# Patient Record
Sex: FEMALE | Race: WHITE
[De-identification: names, ages, dates, MRNs, and addresses within clinical notes are randomized per-mention and may not be internally consistent; named-entity substitution may affect disease eponyms.]

---

## 2018-06-22 ENCOUNTER — HOSPITAL ENCOUNTER (EMERGENCY)
Dept: HOSPITAL 97 - ER | Age: 33
LOS: 1 days | Discharge: HOME | End: 2018-06-23
Payer: COMMERCIAL

## 2018-06-22 DIAGNOSIS — F17.210: ICD-10-CM

## 2018-06-22 DIAGNOSIS — Z88.3: ICD-10-CM

## 2018-06-22 DIAGNOSIS — Z91.040: ICD-10-CM

## 2018-06-22 DIAGNOSIS — R00.2: Primary | ICD-10-CM

## 2018-06-22 DIAGNOSIS — I10: ICD-10-CM

## 2018-06-22 DIAGNOSIS — Z91.048: ICD-10-CM

## 2018-06-22 LAB
BUN BLD-MCNC: 10 MG/DL (ref 7–18)
GLUCOSE SERPLBLD-MCNC: 148 MG/DL (ref 74–106)
HCT VFR BLD CALC: 42 % (ref 36–45)
LYMPHOCYTES # SPEC AUTO: 2.4 K/UL (ref 0.7–4.9)
MAGNESIUM SERPL-MCNC: 2 MG/DL (ref 1.8–2.4)
MCH RBC QN AUTO: 30.4 PG (ref 27–35)
MCV RBC: 87.7 FL (ref 80–100)
METHAMPHET UR QL SCN: NEGATIVE
PMV BLD: 8.2 FL (ref 7.6–11.3)
POTASSIUM SERPL-SCNC: 3.1 MMOL/L (ref 3.5–5.1)
RBC # BLD: 4.8 M/UL (ref 3.86–4.86)
THC SERPL-MCNC: NEGATIVE NG/ML
TSH SERPL DL<=0.05 MIU/L-ACNC: 2.13 UIU/ML (ref 0.36–3.74)

## 2018-06-22 PROCEDURE — 93005 ELECTROCARDIOGRAM TRACING: CPT

## 2018-06-22 PROCEDURE — 80307 DRUG TEST PRSMV CHEM ANLYZR: CPT

## 2018-06-22 PROCEDURE — 85025 COMPLETE CBC W/AUTO DIFF WBC: CPT

## 2018-06-22 PROCEDURE — 96361 HYDRATE IV INFUSION ADD-ON: CPT

## 2018-06-22 PROCEDURE — 71275 CT ANGIOGRAPHY CHEST: CPT

## 2018-06-22 PROCEDURE — 80048 BASIC METABOLIC PNL TOTAL CA: CPT

## 2018-06-22 PROCEDURE — 99285 EMERGENCY DEPT VISIT HI MDM: CPT

## 2018-06-22 PROCEDURE — 84484 ASSAY OF TROPONIN QUANT: CPT

## 2018-06-22 PROCEDURE — 84443 ASSAY THYROID STIM HORMONE: CPT

## 2018-06-22 PROCEDURE — 36415 COLL VENOUS BLD VENIPUNCTURE: CPT

## 2018-06-22 PROCEDURE — 71045 X-RAY EXAM CHEST 1 VIEW: CPT

## 2018-06-22 PROCEDURE — 85379 FIBRIN DEGRADATION QUANT: CPT

## 2018-06-22 PROCEDURE — 81003 URINALYSIS AUTO W/O SCOPE: CPT

## 2018-06-22 PROCEDURE — 81025 URINE PREGNANCY TEST: CPT

## 2018-06-22 PROCEDURE — 83735 ASSAY OF MAGNESIUM: CPT

## 2018-06-22 PROCEDURE — 96360 HYDRATION IV INFUSION INIT: CPT

## 2018-06-22 NOTE — RAD REPORT
EXAM DESCRIPTION:  RAD - Chest Single View - 6/22/2018 9:21 pm

 

CLINICAL HISTORY:  SOB

Chest pain.

 

COMPARISON:  Chest Single View dated 9/20/2017; CHEST SINGLE VIEW dated 7/16/2015; Chest For Pe Angio
 dated 9/21/2017

 

FINDINGS:  Portable technique limits examination quality.

 

The lungs are grossly clear. The heart is normal in size. No displaced fractures.

 

IMPRESSION:  No acute intrathoracic process suspected.

## 2018-06-23 NOTE — RAD REPORT
EXAM DESCRIPTION:  CT - Chest For Pe Angio - 6/23/2018 7:23 am

 

CLINICAL HISTORY:  Chest pain.

SOB

 

COMPARISON:  Chest For Pe Angio dated 9/21/2017; CTANGIO CHEST FOR PE dated 7/16/2015

 

TECHNIQUE:  CT angiogram of the pulmonary arteries was performed with MIP.

 

All CT scans are performed using dose optimization technique as appropriate and may include automated
 exposure control or mA/KV adjustment according to patient size.

 

FINDINGS:  No evidence of pulmonary thromboembolism.

 

No acute aortic finding demonstrated.

 

The lungs are clear.

 

No significant pericardial or pleural fluid.

 

No concerning bony finding.

 

IMPRESSION:  No evidence of pulmonary thromboembolism.

 

No acute lung findings.

## 2018-06-23 NOTE — ER
Nurse's Notes                                                                                     

 [facility]                                                                

Name: Pia Hawkins                                                                             

Age: 33 yrs                                                                                       

Sex: Female                                                                                       

: 1985                                                                                   

MRN: [id number]                                                                                   

Arrival Date: 2018                                                                          

Time: 20:26                                                                                       

Account#: [id number]                                                                            

Bed 8                                                                                             

Private MD: Cristian Davison                                                                        

Diagnosis: Tachycardia, unspecified;Dyspnea                                                       

                                                                                                  

Presentation:                                                                                     

                                                                                             

20:45 Presenting complaint: Patient states: SOB and rapid HR that started suddenly after      aj  

      eating dinner tonight. Patient also reports nausea. Transition of care: patient was not     

      received from another setting of care. Onset of symptoms was 2018. Risk            

      Assessment: Do you want to hurt yourself or someone else? Patient reports no desire to      

      harm self or others. Initial Sepsis Screen: Does the patient meet any 2 criteria? HR >      

      90 bpm. Does the patient have a suspected source of infection? No. Patient's initial        

      sepsis screen is negative. Care prior to arrival: None.                                     

20:45 Method Of Arrival: Ambulatory                                                           aj  

20:45 Acuity: QUINTIN 2                                                                           aj  

                                                                                                  

Triage Assessment:                                                                                

20:48 General: Appears in no apparent distress. uncomfortable, obese, Behavior is calm,       aj  

      cooperative, appropriate for age. Pain: Denies pain. Neuro: Level of Consciousness is       

      awake, alert, obeys commands, Oriented to person, place, time, situation, Appropriate       

      for age. Cardiovascular: Reports shortness of breath, Capillary refill < 3 seconds in       

      bilateral fingers Patient's skin is warm and dry. Rhythm is sinus tachycardia.              

      Respiratory: Reports shortness of breath at rest Onset: The symptoms/episode                

      began/occurred just prior to arrival, the patient has mild shortness of breath. Derm:       

      Skin is intact, is healthy with good turgor, Skin is pink, warm \T\ dry. normal.            

                                                                                                  

OB/GYN:                                                                                           

20:48 LMP 2018                                                                           aj  

                                                                                                  

Historical:                                                                                       

- Allergies:                                                                                      

20:48 Clindamycin;                                                                            aj  

20:48 Latex, Natural Rubber;                                                                  aj  

- Home Meds:                                                                                      

20:48 Lexapro 20 mg Oral tab 1 tab once daily [Active]; metformin 1,000 mg oral tab 1 tab     aj  

      [Active]; lisinopril-hydrochlorothiazide 20-12.5 mg oral tab 1 tab once daily [Active];     

- PMHx:                                                                                           

20:48 PCOS; Hypertension;                                                                     aj  

- PSHx:                                                                                           

20:48 D \T\ C;                                                                                  aj

                                                                                                  

- Immunization history:: Adult Immunizations up to date.                                          

- Social history:: Smoking status: Patient uses tobacco products, smokes one-half pack            

  cigarettes per day.                                                                             

- Ebola Screening: : Patient negative for fever greater than or equal to 101.5 degrees            

  Fahrenheit, and additional compatible Ebola Virus Disease symptoms Patient denies               

  exposure to infectious person Patient denies travel to an Ebola-affected area in the            

  21 days before illness onset No symptoms or risks identified at this time.                      

                                                                                                  

                                                                                                  

Screenin:05 Abuse screen: Denies threats or abuse. Denies injuries from another. Nutritional        rv  

      screening: No deficits noted. Tuberculosis screening: No symptoms or risk factors           

      identified. Fall Risk None identified.                                                      

                                                                                                  

Assessment:                                                                                       

21:04 Reassessment: Patient appears in no apparent distress at this time. General: Appears    rv  

      uncomfortable, Behavior is calm, cooperative. Pain: Denies pain. Neuro: Cardiovascular:     

      Heart tones S1 S2 present. Respiratory: Airway is patent Respiratory effort is even,        

      Breath sounds are clear bilaterally.                                                        

22:00 Reassessment: Patient appears in no apparent distress at this time. Patient and/or      aa1 

      family updated on plan of care and expected duration. Pain level reassessed. Patient is     

      alert, oriented x 3, equal unlabored respirations, skin warm/dry/pink. Awaiting urine       

      sample.                                                                                     

23:00 Reassessment: Patient appears in no apparent distress at this time. Patient and/or      aa1 

      family updated on plan of care and expected duration. Pain level reassessed. Patient is     

      alert, oriented x 3, equal unlabored respirations, skin warm/dry/pink. Awaiting             

      provider reassessment.                                                                      

23:30 Reassessment: Patient appears in no apparent distress at this time. Pt taken to CT.     aa1 

                                                                                             

00:10 Reassessment: Patient appears in no apparent distress at this time. Patient and/or      rv  

      family updated on plan of care and expected duration. Pain level reassessed. Patient is     

      alert, oriented x 3, equal unlabored respirations, skin warm/dry/pink. patient came         

      back from CT scan. awaiting result. vital signs are stable.                                 

                                                                                                  

Vital Signs:                                                                                      

                                                                                             

20:48  / 105; Pulse 133; Resp 20; Temp 97.9; Pulse Ox 95% on R/A; Weight 127.01 kg;     aj  

      Height 5 ft. 5 in. (165.10 cm);                                                             

21:45  / 86; Pulse 110; Resp 18; Pulse Ox 96% on R/A;                                   aa1 

22:50  / 80; Pulse 90; Resp 18; Pulse Ox 96% on R/A;                                    aa1 

                                                                                             

00:11  / 77; Pulse 91; Resp 16; Pulse Ox 97% on R/A;                                    rv  

00:52  / 79; Pulse 85; Resp 16; Pulse Ox 97% on R/A;                                    rv  

                                                                                             

20:48 Body Mass Index 46.59 (127.01 kg, 165.10 cm)                                              

                                                                                                  

ED Course:                                                                                        

                                                                                             

20:26 Patient arrived in ED.                                                                  am2 

20:26 Cristian Davison MD is Private Physician.                                                am2 

20:41 Damian López MD is Attending Physician.                                              gs  

20:46 Triage completed.                                                                       aj  

20:48 Arm band placed on right wrist. Patient placed in an exam room, on a stretcher, on      aj  

      cardiac monitor, on pulse oximetry. EKG completed in triage. Results shown to MD.           

20:58 Inserted saline lock: 20 gauge in right forearm, using aseptic technique.               rv  

21:06 Patient has correct armband on for positive identification. Placed in gown. Bed in low  rv  

      position. Call light in reach. Side rails up X 1. Adult w/ patient. Cardiac monitor on.     

      Pulse ox on. NIBP on.                                                                       

21:20 X-ray completed. Portable x-ray completed in exam room. Patient tolerated procedure     kc2 

      well.                                                                                       

21:21 XRAY Chest (1 view) In Process Unspecified.                                             EDMS

22:30 Urine collected: clean catch specimen.                                                  aa1 

23:22 Patient moved to CT via wheelchair.                                                     vr  

23:45 CT completed. Patient tolerated procedure well. Patient moved back from CT.             kw1 

23:49 CT Chest For PE Angio In Process Unspecified.                                           EDMS

                                                                                             

00:54 No provider procedures requiring assistance completed. IV discontinued, bleeding        rv  

      controlled, No redness/swelling at site. Pressure dressing applied.                         

                                                                                                  

Administered Medications:                                                                         

                                                                                             

20:57 Drug: NS 0.9% 1000 ml Route: IV; Rate: 1 bolus; Site: right forearm;                    rv  

                                                                                             

00:53 Follow up: IV Status: Completed infusion                                                rv  

                                                                                                  

                                                                                                  

Outcome:                                                                                          

00:44 Discharge ordered by MD.                                                                gs  

00:54 Discharged to home ambulatory.                                                          rv  

00:54 Condition: improved                                                                         

00:54 Discharge instructions given to patient, Instructed on discharge instructions.              

00:54 Patient left the ED.                                                                    rv  

                                                                                                  

Signatures:                                                                                       

Dispatcher [employer]                           EDMS                                                 

Cristina Ahmadi RN RN aa1 Myers, Amanda, RN RN aj Davis, Victoria vr Carr, Kelsie kc2                                                  

Estephanie Giang Gregory, MD MD                                                      

Pat Colbert                            kw1                                                  

Juan Fortune RN RN   rv                                                   

                                                                                                  

Corrections: (The following items were deleted from the chart)                                    

                                                                                             

20:47 20:45 Initial Sepsis Screen: Does the patient meet any 2 criteria? HR > 90 bpm. Does    aj  

      the patient have a suspected source of infection? No. Patient's initial sepsis screen       

      is negative. aj                                                                             

                                                                                                  

**************************************************************************************************

## 2018-06-23 NOTE — EDPHYS
Physician Documentation                                                                           

 [facility]                                                                

Name: Pia Hawkins                                                                             

Age: 33 yrs                                                                                       

Sex: Female                                                                                       

: 1985                                                                                   

MRN: [id number]                                                                                   

Arrival Date: 2018                                                                          

Time: 20:26                                                                                       

Account#: [id number]                                                                            

Bed 8                                                                                             

Private MD: Cristian Davison                                                                        

ED Physician Damian López                                                                       

HPI:                                                                                              

                                                                                             

00:38 This 33 yrs old  Female presents to ER via Ambulatory with complaints of       gs  

      Shortness Of Breath, Heart racing.                                                          

00:38 The patient has shortness of breath at rest. Onset: The symptoms/episode began/occurred gs  

      yesterday. Duration: The symptoms are continuous. The patient's shortness of breath is      

      aggravated by nothing, is alleviated by nothing. Associated signs and symptoms:             

      Pertinent positives: palpitations, Pertinent negatives: chest pain. Severity of             

      symptoms: At their worst the symptoms were severe in the emergency department the           

      symptoms are unchanged. The patient has experienced similar episodes in the past, a few     

      times.                                                                                      

                                                                                                  

OB/GYN:                                                                                           

                                                                                             

20:48 LMP 2018                                                                           aj  

                                                                                                  

Historical:                                                                                       

- Allergies:                                                                                      

20:48 Clindamycin;                                                                            aj  

20:48 Latex, Natural Rubber;                                                                  aj  

- Home Meds:                                                                                      

20:48 Lexapro 20 mg Oral tab 1 tab once daily [Active]; metformin 1,000 mg oral tab 1 tab     aj  

      [Active]; lisinopril-hydrochlorothiazide 20-12.5 mg oral tab 1 tab once daily [Active];     

- PMHx:                                                                                           

20:48 PCOS; Hypertension;                                                                     aj  

- PSHx:                                                                                           

20:48 D \T\ C;                                                                                  aj

                                                                                                  

- Immunization history:: Adult Immunizations up to date.                                          

- Social history:: Smoking status: Patient uses tobacco products, smokes one-half pack            

  cigarettes per day.                                                                             

- Ebola Screening: : Patient negative for fever greater than or equal to 101.5 degrees            

  Fahrenheit, and additional compatible Ebola Virus Disease symptoms Patient denies               

  exposure to infectious person Patient denies travel to an Ebola-affected area in the            

  21 days before illness onset No symptoms or risks identified at this time.                      

                                                                                                  

                                                                                                  

ROS:                                                                                              

                                                                                             

00:38 All other systems are negative.                                                         gs  

                                                                                                  

Exam:                                                                                             

00:38 Head/Face:  Normocephalic, atraumatic. Eyes:  Pupils equal round and reactive to light, gs  

      extra-ocular motions intact.  Lids and lashes normal.  Conjunctiva and sclera are           

      non-icteric and not injected.  Cornea within normal limits.  Periorbital areas with no      

      swelling, redness, or edema. ENT:  Nares patent. No nasal discharge, no septal              

      abnormalities noted.  Tympanic membranes are normal and external auditory canals are        

      clear.  Oropharynx with no redness, swelling, or masses, exudates, or evidence of           

      obstruction, uvula midline.  Mucous membranes moist. Neck:  Trachea midline, no             

      thyromegaly or masses palpated, and no cervical lymphadenopathy.  Supple, full range of     

      motion without nuchal rigidity, or vertebral point tenderness.  No Meningismus.             

      Chest/axilla:  Normal chest wall appearance and motion.  Nontender with no deformity.       

      No lesions are appreciated.                                                                 

00:38 Abdomen/GI:  Soft, non-tender, with normal bowel sounds.  No distension or tympany.  No     

      guarding or rebound.  No evidence of tenderness throughout. Back:  No spinal                

      tenderness.  No costovertebral tenderness.  Full range of motion. Skin:  Warm, dry with     

      normal turgor.  Normal color with no rashes, no lesions, and no evidence of cellulitis.     

      MS/ Extremity:  Pulses equal, no cyanosis.  Neurovascular intact.  Full, normal range       

      of motion. Neuro:  Awake and alert, GCS 15, oriented to person, place, time, and            

      situation.  Cranial nerves II-XII grossly intact.  Motor strength 5/5 in all                

      extremities.  Sensory grossly intact.  Cerebellar exam normal.  Normal gait.                

00:38 Constitutional: The patient appears alert, awake.                                           

00:38 Cardiovascular: Rate: tachycardic, Rhythm: regular, Pulses: no pulse deficits are           

      appreciated.                                                                                

00:38 ECG was reviewed by the Attending Physician.                                                

00:38 Respiratory: the patient does not display signs of respiratory distress,  Respirations:     

      normal, Breath sounds: are clear throughout, no bronchial sounds, no wheezing.              

                                                                                                  

Vital Signs:                                                                                      

                                                                                             

20:48  / 105; Pulse 133; Resp 20; Temp 97.9; Pulse Ox 95% on R/A; Weight 127.01 kg;     aj  

      Height 5 ft. 5 in. (165.10 cm);                                                             

21:45  / 86; Pulse 110; Resp 18; Pulse Ox 96% on R/A;                                   aa1 

22:50  / 80; Pulse 90; Resp 18; Pulse Ox 96% on R/A;                                    aa1 

                                                                                             

00:11  / 77; Pulse 91; Resp 16; Pulse Ox 97% on R/A;                                    rv  

00:52  / 79; Pulse 85; Resp 16; Pulse Ox 97% on R/A;                                    rv  

                                                                                             

20:48 Body Mass Index 46.59 (127.01 kg, 165.10 cm)                                            aj  

                                                                                                  

MDM:                                                                                              

                                                                                             

20:49 Patient medically screened.                                                               

                                                                                             

00:38 Differential diagnosis: Anxiety Reaction pulmonary edema, Pulmonary Embolism            gs  

      arrthymia,thyroid abl. Data reviewed: vital signs, nurses notes. Medication response:       

      labetalol reduced the patient's elevated blood pressure to within acceptable limits.        

                                                                                                  

                                                                                             

20:51 Order name: Basic Metabolic Panel                                                         

                                                                                             

20:51 Order name: CBC with Diff                                                                 

                                                                                             

20:51 Order name: Magnesium                                                                     

                                                                                             

20:51 Order name: Troponin (emerg Dept Use Only)                                                

                                                                                             

20:51 Order name: D-Dimer; Complete Time: 21:38                                                 

                                                                                             

20:51 Order name: TSH                                                                           

                                                                                             

20:51 Order name: Urine Drug Screen; Complete Time: 00:45                                       

                                                                                             

20:51 Order name: Basic Metabolic Panel; Complete Time: 21:38                                 [facility]

                                                                                             

20:51 Order name: CBC with Automated Diff; Complete Time: 21:38                               [facility]

                                                                                             

20:51 Order name: Magnesium; Complete Time: 21:38                                             [facility]

                                                                                             

20:51 Order name: Troponin (Emerg Dept Use Only); Complete Time: 21:38                        [facility]

                                                                                             

20:51 Order name: Thyroid Stimulating Hormone; Complete Time: 21:38                           [facility]

                                                                                             

23:08 Order name: Urine Dipstick--Ancillary (enter results); Complete Time: 00:45             [facility] 

                                                                                             

23:08 Order name: Urine Pregnancy--Ancillary (enter results); Complete Time: 00:45            [facility] 

                                                                                             

20:51 Order name: XRAY Chest (1 view); Complete Time: 21:38                                     

                                                                                             

20:51 Order name: EKG; Complete Time: 20:52                                                     

                                                                                             

20:51 Order name: Cardiac monitoring; Complete Time: 20:57                                      

                                                                                             

20:51 Order name: EKG - Nurse/Tech; Complete Time: 20:57                                        

                                                                                             

20:51 Order name: IV Saline Lock; Complete Time: 20:58                                          

                                                                                             

20:51 Order name: Labs collected and sent; Complete Time: 20:58                                 

                                                                                             

20:51 Order name: O2 Per Protocol; Complete Time: 20:58                                         

                                                                                             

20:51 Order name: O2 Sat Monitoring; Complete Time: 20:58                                       

                                                                                             

20:51 Order name: Urine Dipstick-Ancillary (obtain specimen); Complete Time: 23:10              

                                                                                             

20:51 Order name: Urine Pregnancy Test (obtain specimen); Complete Time: 23:38                  

                                                                                             

23:16 Order name: CT Chest For PE Angio                                                         

                                                                                                  

EC:38 Rate is 106 beats/min. Rhythm is regular. CT interval is normal. QRS interval is        gs  

      normal. T waves are Normal. No ST changes noted. Clinical impression: Abnormal EKG          

      without significant change. Interpreted by me.                                              

                                                                                                  

Administered Medications:                                                                         

                                                                                             

20:57 Drug: NS 0.9% 1000 ml Route: IV; Rate: 1 bolus; Site: right forearm;                    rv  

                                                                                             

00:53 Follow up: IV Status: Completed infusion                                                rv  

                                                                                                  

                                                                                                  

Disposition:                                                                                      

18 00:44 Discharged to Home. Impression: Tachycardia, unspecified, Dyspnea.                 

- Condition is Stable.                                                                            

- Discharge Instructions: Palpitations, Shortness of Breath, Easy-to-Read.                        

                                                                                                  

- Medication Reconciliation Form, Thank You Letter, Antibiotic Education, Prescription            

  Opioid Use form.                                                                                

- Follow up: Private Physician; When: 2 - 3 days; Reason: Re-evaluation by your                   

  physician.                                                                                      

                                                                                                  

                                                                                                  

                                                                                                  

Signatures:                                                                                       

Dispatcher [employer]                           Estephanie Brown RN RN aj Starr, Gregory, MD MD                                                      

Juan Fortune RN RN   rv                                                   

                                                                                                  

Corrections: (The following items were deleted from the chart)                                    

00:54 00:44 2018 00:44 Discharged to Home. Impression: Tachycardia, unspecified;        rv  

      Dyspnea. Condition is Stable. Forms are Medication Reconciliation Form, Thank You           

      Letter, Antibiotic Education, Prescription Opioid Use. Follow up: Private Physician;        

      When: 2 - 3 days; Reason: Re-evaluation by your physician.                                

                                                                                                  

**************************************************************************************************

## 2018-06-25 NOTE — EKG
Test Date:    2018-06-22               Test Time:    20:47:47

Technician:                                          

                                                     

MEASUREMENT RESULTS:                                       

Intervals:                                           

Rate:         106                                    

OH:           148                                    

QRSD:         78                                     

QT:           326                                    

QTc:          433                                    

Axis:                                                

P:            38                                     

OH:           148                                    

QRS:          22                                     

T:            24                                     

                                                     

INTERPRETIVE STATEMENTS:                                       

                                                     

Sinus tachycardia

Abnormal ECG

Compared to ECG 07/15/2015 21:55:32

Sinus rhythm no longer present



Electronically Signed On 06-25-18 07:00:18 CDT by Christiano Gunter

## 2020-07-28 ENCOUNTER — HOSPITAL ENCOUNTER (EMERGENCY)
Dept: HOSPITAL 97 - ER | Age: 35
Discharge: HOME | End: 2020-07-28
Payer: COMMERCIAL

## 2020-07-28 VITALS — TEMPERATURE: 98.6 F

## 2020-07-28 VITALS — DIASTOLIC BLOOD PRESSURE: 70 MMHG | SYSTOLIC BLOOD PRESSURE: 112 MMHG

## 2020-07-28 VITALS — OXYGEN SATURATION: 99 %

## 2020-07-28 DIAGNOSIS — Z91.040: ICD-10-CM

## 2020-07-28 DIAGNOSIS — Z91.048: ICD-10-CM

## 2020-07-28 DIAGNOSIS — Z88.3: ICD-10-CM

## 2020-07-28 DIAGNOSIS — R00.2: Primary | ICD-10-CM

## 2020-07-28 DIAGNOSIS — I10: ICD-10-CM

## 2020-07-28 LAB
ALBUMIN SERPL BCP-MCNC: 3.9 G/DL (ref 3.4–5)
ALP SERPL-CCNC: 74 U/L (ref 45–117)
ALT SERPL W P-5'-P-CCNC: 32 U/L (ref 12–78)
AST SERPL W P-5'-P-CCNC: 15 U/L (ref 15–37)
BUN BLD-MCNC: 8 MG/DL (ref 7–18)
GLUCOSE SERPLBLD-MCNC: 152 MG/DL (ref 74–106)
HCT VFR BLD CALC: 39.1 % (ref 36–45)
INR BLD: 1.04
LYMPHOCYTES # SPEC AUTO: 2.2 K/UL (ref 0.7–4.9)
MAGNESIUM SERPL-MCNC: 2 MG/DL (ref 1.8–2.4)
NT-PROBNP SERPL-MCNC: 31 PG/ML (ref ?–125)
PMV BLD: 8.9 FL (ref 7.6–11.3)
POTASSIUM SERPL-SCNC: 3.6 MMOL/L (ref 3.5–5.1)
RBC # BLD: 4.39 M/UL (ref 3.86–4.86)
TROPONIN (EMERG DEPT USE ONLY): < 0.02 NG/ML (ref 0–0.04)

## 2020-07-28 PROCEDURE — 99284 EMERGENCY DEPT VISIT MOD MDM: CPT

## 2020-07-28 PROCEDURE — 80076 HEPATIC FUNCTION PANEL: CPT

## 2020-07-28 PROCEDURE — 85610 PROTHROMBIN TIME: CPT

## 2020-07-28 PROCEDURE — 71045 X-RAY EXAM CHEST 1 VIEW: CPT

## 2020-07-28 PROCEDURE — 84484 ASSAY OF TROPONIN QUANT: CPT

## 2020-07-28 PROCEDURE — 93005 ELECTROCARDIOGRAM TRACING: CPT

## 2020-07-28 PROCEDURE — 96361 HYDRATE IV INFUSION ADD-ON: CPT

## 2020-07-28 PROCEDURE — 84703 CHORIONIC GONADOTROPIN ASSAY: CPT

## 2020-07-28 PROCEDURE — 83735 ASSAY OF MAGNESIUM: CPT

## 2020-07-28 PROCEDURE — 36415 COLL VENOUS BLD VENIPUNCTURE: CPT

## 2020-07-28 PROCEDURE — 71275 CT ANGIOGRAPHY CHEST: CPT

## 2020-07-28 PROCEDURE — 82565 ASSAY OF CREATININE: CPT

## 2020-07-28 PROCEDURE — 96374 THER/PROPH/DIAG INJ IV PUSH: CPT

## 2020-07-28 PROCEDURE — 85025 COMPLETE CBC W/AUTO DIFF WBC: CPT

## 2020-07-28 PROCEDURE — 83880 ASSAY OF NATRIURETIC PEPTIDE: CPT

## 2020-07-28 PROCEDURE — 80048 BASIC METABOLIC PNL TOTAL CA: CPT

## 2020-07-28 NOTE — XMS REPORT
Summary of Care

                            Created on:2020



Patient:Pia Hawkins

Sex:Female

:1985

External Reference #:[id number]





Demographics







                          Address                   [address] [place] [address]

 

                          Mobile Phone              [phone]

 

                          Home Phone                [phone]

 

                          Email Address             [email or web address]

 

                          Email Address             [email or web address]

 

                          Preferred Language        English

 

                          Marital Status            

 

                          Alevism Affiliation     Unknown

 

                          Race                      White

 

                          Ethnic Group              Not  or 









Author







                          Organization              [facility]

 

                          Address                   [address] [place] [id number]









Support







                Name            Relationship    Address         Phone

 

                Jian Hawkins Unavailable     [address] [facility]   [phone]



                                                [place] [address] 









Care Team Providers







                    Name                Role                Phone

 

                    DEBBIE Roberts       Primary Care Provider [phone]









Reason for Visit







                          Reason                    Comments

 

                          Refill Request            







Encounter Details







             Date         Type         Department   Care Team    Description

 

                2020      Refill          [facility] José Miguel morris, MD [facility]



                                        Refill Request



                                                            - [address]



                                        [place] [address]



                                        



                                                            [place] [address]

[address]



                                        [phone] (Fax) 







Allergies







             Active Allergy Reactions    Severity     Noted Date   Comments

 

             Clindamycin  Unknown - See comments              2016   

 

             Erythromycin Unknown - See comments              2016   

 

             Latex        Rash                      2016   



documented as of this encounter (statuses as of 2020)



Medications







          Medication Sig       Dispensed Refills   Start Date End Date  Status

 

          aspirin (ASPIRIN Take 81 mg by           0                            

 Active



          LOW DOSE) 81 mg EC mouth daily.                                       

  



          tablet                                                      

 

          lisinopril-hydrochl Take 1 tablet           0                         

    Active



          orothiazide 20-12.5 by mouth every                                    

     



          mg per    morning.                                          



          tabletIndications:                                                   



          Atypical chest                                                   



          pain, Essential                                                   



          hypertension                                                   

 

          buPROPion SR Start 1 tab PO 60 tablet 5         2019           A

ctive



          (WELLBUTRIN SR) 150 daily x 3                                         



          mg SR     days, then                                         



          tabletIndications: increase to 1                                      

   



          Tobacco use tab PO BID.                                         



          disorder, Encounter                                                   



          for counseling for                                                   



          tobacco use                                                   



          disorder, Anxiety                                                   

 

          ESCITALOPRAM Take 1 tablet 30 tablet 0         2020           Ac

tive



          OXALATE 20 mg by mouth once                                         



          tabletIndications: daily                                             



          Anxiety                                                     

 

          ESCITALOPRAM Take 1 tablet 30 tablet 0         06/10/2020 2020 D

iscontinued



          OXALATE 20 mg by mouth once                                         



          tabletIndications: daily                                             



          Anxiety                                                     



documented as of this encounter (statuses as of 2020)



Active Problems







                          Problem                   Noted Date

 

                          Anxiety                   2016

 

                          Essential hypertension    2016

 

                          Coagulation defect        2016



documented as of this encounter (statuses as of 2020)



Immunizations







                    Name                Administration Dates Next Due

 

                    HEP B, Adult Dosage 2018          

 

                    TDAP                2018          



documented as of this encounter



Social History







             Tobacco Use  Types        Packs/Day    Years Used   Date

 

             Current Every Day Smoker Cigarettes   0.5          10           









                Smokeless Tobacco: Never Used                                 









                Alcohol Use     Drinks/Week     oz/Week         Comments

 

                Yes                                             occasional









                          Sex Assigned at Birth     Date Recorded

 

                          Not on file               









                    Job Start Date      Occupation          Industry

 

                    Not on file         Not on file         Not on file









                    Travel History      Travel Start        Travel End









                                        No recent travel history available.



documented as of this encounter



Last Filed Vital Signs

Not on filedocumented in this encounter



Plan of Treatment







                Health Maintenance Due Date        Last Done       Comments

 

                PNEUMOCOCCAL 0-64 YEARS COMBINED 1991                     

 



                SERIES (1 of 1 - PPSV23)                                 

 

                Depression Screening 1997                      

 

                PAP SMEAR       2019, 2016 



                                                (Previously completed) 

 

                INFLUENZA VACCINE (#1) 2020      

 

                DTaP,Tdap,and Td Vaccines (2 - Td) 2028   

   



documented as of this encounter



Results

Not on filedocumented in this encounter



Visit Diagnoses







                                        Diagnosis

 

                                        Anxiety







                                        Anxiety state, unspecified



documented in this encounter



Insurance







          Payer     Benefit Plan / Group Subscriber ID Effective Dates Phone    

 Address   Type

 

          CIGNA     CIGNA II  [id number] 2020-Present                     H

MO/PPO/POS



documented as of this encounter

## 2020-07-28 NOTE — ER
Nurse's Notes                                                                                     

 [facility]                                                                 

Name: Pia Hawkins                                                                             

Age: 35 yrs                                                                                       

Sex: Female                                                                                       

: 1985                                                                                   

MRN: [id number]                                                                                   

Arrival Date: 2020                                                                          

Time: 18:54                                                                                       

Account#: [id number]                                                                            

Bed 4                                                                                             

Private MD:                                                                                       

Diagnosis: Palpitations                                                                           

                                                                                                  

Presentation:                                                                                     

                                                                                             

18:54 Chief complaint: Patient states: Sudden onset of palpitations,  bmp, dizziness    ss  

      and near syncope while at work. Coronavirus screen: Patient denies a cough. Patient         

      reports shortness of breath or difficulty breathing. Patient denies measured and/or         

      subjective temperature greater than 100.4F prior to today's visit. Patient denies           

      travel on a cruise ship or to a country the [facility] currently lists as an affected area.        

      Patient reports contact with known and/or suspected case of COVID-19. Ebola Screen:         

      Patient denies exposure to infectious person. Patient denies travel to an                   

      Ebola-affected area in the 21 days before illness onset. Risk Assessment: Do you want       

      to hurt yourself or someone else? Patient reports no desire to harm self or others.         

      Onset of symptoms was 2020.                                                        

18:54 Method Of Arrival: Ambulatory                                                           ss  

18:54 Acuity: QUINTIN 3                                                                           ss  

19:01 Initial Sepsis Screen: Does the patient meet any 2 criteria? No. Patient's initial      sv  

      sepsis screen is negative. Does the patient have a suspected source of infection? No.       

      Patient's initial sepsis screen is negative.                                                

                                                                                                  

Historical:                                                                                       

- Allergies:                                                                                      

18:58 Clindamycin;                                                                            ss  

18:58 Latex, Natural Rubber;                                                                  ss  

- Home Meds:                                                                                      

18:58 Lexapro 20 mg Oral tab 1 tab once daily [Active]; Wellbutrin Oral [Active];             ss  

- PMHx:                                                                                           

18:58 Hypertension; PCOS;                                                                     ss  

- PSHx:                                                                                           

18:58 D \T\ C;                                                                                  ss

                                                                                                  

- Immunization history:: Adult Immunizations up to date.                                          

- Social history:: Smoking status: unknown.                                                       

                                                                                                  

                                                                                                  

Screenin:01 Abuse screen: Denies threats or abuse. Denies injuries from another. Nutritional        sv  

      screening: No deficits noted. Tuberculosis screening: No symptoms or risk factors           

      identified. Fall Risk None identified.                                                      

                                                                                                  

Assessment:                                                                                       

18:30 General: Appears uncomfortable, ill, well groomed, well developed, well nourished,      sg  

      Behavior is cooperative, appropriate for age. Pain: Denies pain. Neuro: Level of            

      Consciousness is awake, alert, obeys commands, Oriented to person, place, time,        

      are equal bilaterally Speech is normal, Facial symmetry appears normal. Cardiovascular:     

      Patient's skin is warm and dry. Chest pain is denied. Respiratory: Airway is patent         

      Respiratory effort is even, unlabored, Respiratory pattern is regular, symmetrical. GI:     

      Abdomen is round non-distended, Reports nausea, vomiting. : No signs and/or symptoms      

      were reported regarding the genitourinary system. EENT: No signs and/or symptoms were       

      reported regarding the EENT system. Derm: Skin is pink, warm \T\ dry. Musculoskeletal:      

      Circulation, motion, and sensation intact. Range of motion: intact in all extremities.      

18:54 Reassessment: Patient appears in no apparent distress at this time. Patient and/or      sg  

      family updated on plan of care and expected duration. Pain level reassessed. Patient is     

      alert, oriented x 3, equal unlabored respirations, skin warm/dry/pink. Patient states       

      symptoms have not improved.                                                                 

19:50 Reassessment: Provider updating pt on plan of care, verbalized the understanding of     ea  

      instruction.                                                                                

20:43 Reassessment: Patient appears in no apparent distress at this time. Patient is alert,   sg  

      oriented x 3, equal unlabored respirations, skin warm/dry/pink. tolerating PO water at      

      this time, pt reports nausea is gone, feeling better, IVF infusing at ordered rate, 200     

      mL left Patient states feeling better. Patient states symptoms have improved.               

21:10 Reassessment: Patient and/or family updated on plan of care and expected duration. Pain ea  

      level reassessed. Patient is alert, oriented x 3, equal unlabored respirations, skin        

      warm/dry/pink. Discharge instruction given to patient, verbalized the understanding of      

      instruction. Pt left ED ambulatory tolerating well. Patient states feeling better.          

                                                                                                  

Vital Signs:                                                                                      

18:54  / 99; Pulse 97; Resp 19; Temp 98.6(O); Pulse Ox 98% on R/A;                      ss  

19:00  / 83 RA Supine (auto/lg); Pulse 83; Resp 18; Pulse Ox 100% on R/A;               sg  

19:05  / 83 RA Sitting (auto/lg); Pulse 88;                                             sg  

19:10  / 91 RA Standing (auto/lg); Pulse 88;                                            sg  

20:20  / 70; Pulse 77; Resp 16; Temp 98.6; Pulse Ox 100% on R/A;                        sg  

20:59  / 70; Pulse 65; Resp 18; Pulse Ox 99% ;                                          ea  

                                                                                                  

ED Course:                                                                                        

18:54 Patient arrived in ED.                                                                  ss  

18:56 Triage completed.                                                                       ss  

18:56 Julio Owusu NP is PHCP.                                                           pm1 

18:56 Delmar Villarreal MD is Attending Physician.                                             pm1 

18:59 Arm band placed on.                                                                     sv  

18:59 Patient has correct armband on for positive identification. Placed in gown. Bed in low  sv  

      position. Call light in reach. Cardiac monitor on. Pulse ox on. NIBP on.                    

18:59 EKG done, by ED staff, reviewed by Julio Owusu NP.                                  sv  

19:25 XRAY Chest (1 view) In Process Unspecified.                                             EDMS

19:25 Shayan Freedman, RN is Primary Nurse.                                                       sg  

19:34 Initial lab(s) drawn, by me, sent to lab. Inserted saline lock: 20 gauge in left        sg  

      antecubital area, using aseptic technique. Blood collected.                                 

19:41 CT Chest For PE Angio In Process Unspecified.                                           EDMS

21:02 No provider procedures requiring assistance completed. IV discontinued, intact,         ea  

      bleeding controlled, No redness/swelling at site. Pressure dressing applied.                

                                                                                                  

Administered Medications:                                                                         

19:33 Drug: NS 0.9% 1000 ml Route: IV; Rate: 1000 ml; Site: left antecubital;                 sg  

21:09 Follow up: Response: No adverse reaction; IV Status: Completed infusion; IV Intake:     ea  

      1000ml                                                                                      

19:33 Drug: Zofran (Ondansetron) 4 mg Route: IVP; Site: left antecubital;                     sg  

21:09 Follow up: Response: No adverse reaction                                                ea  

                                                                                                  

                                                                                                  

Intake:                                                                                           

21:09 IV: 1000ml; Total: 1000ml.                                                              ea  

                                                                                                  

Outcome:                                                                                          

21:07 Discharge ordered by MD.                                                                pm1 

21:08 Condition: stable                                                                       ea  

21:09 Discharged to home ambulatory.                                                          ea  

21:09 Discharge instructions given to patient, Instructed on discharge instructions, follow       

      up and referral plans. Demonstrated understanding of instructions, follow-up care.          

21:10 Patient left the ED.                                                                    ea  

                                                                                                  

Signatures:                                                                                       

Dispatcher [employer]                           EDMS                                                 

Radha Rodriguez RN RN                                                      

Shayan Freedman RN RN sg Smirch, Shelby, RN RN                                                      

Julio Owusu, NP                    NP   pm1                                                  

Camila Cavanaugh, RN                      RN   ea                                                   

                                                                                                  

**************************************************************************************************

## 2020-07-28 NOTE — RAD REPORT
EXAM DESCRIPTION:  [place] Single View7/28/2020 7:25 pm

 

CLINICAL HISTORY:  sob

 

COMPARISON:  2018

 

FINDINGS:   The lungs appear clear of acute infiltrate. The heart is normal size

 

IMPRESSION:   No acute abnormalities displayed

## 2020-07-28 NOTE — RAD REPORT
EXAM DESCRIPTION:  CT - Chest For Pe Angio - 7/28/2020 7:39 pm

 

CLINICAL HISTORY:   sob

 

COMPARISON:  2018

 

TECHNIQUE:  Dynamically enhanced axial 3 mm thick images of the chest were obtained during administra
tion of <100> mL Isovue 370 IV contrast. Coronal and oblique reconstruction images were generated and
 reviewed. Exam utilizes a protocol for optimal evaluation of pulmonary arterial tree.

 

Maximum intensity projections 3D imaging was utilized

 

All CT scans are performed using dose optimization technique as appropriate and may include automated
 exposure control or mA/KV adjustment according to patient size.

 

FINDINGS:  A pulmonary embolus is not seen.

 

A thoracic aortic aneurysm is not noted.

 

A pleural effusion is not seen. A pericardial effusion is not seen.

 

A lung consolidation is not present.

 

IMPRESSION:  Negative for a pulmonary embolism.

## 2020-07-28 NOTE — EDPHYS
Physician Documentation                                                                           

 [facility]                                                                 

Name: Pia Hawkins                                                                             

Age: 35 yrs                                                                                       

Sex: Female                                                                                       

: 1985                                                                                   

MRN: [id number]                                                                                   

Arrival Date: 2020                                                                          

Time: 18:54                                                                                       

Account#: [id number]                                                                            

Bed 4                                                                                             

Private MD:                                                                                       

ED Physician Delmar Villarreal                                                                      

HPI:                                                                                              

                                                                                             

19:05 This 35 yrs old  Female presents to ER via Ambulatory with complaints of       pm1 

      Palpitations.                                                                               

19:05 The patient presents with a history of heart racing. Context: The symptoms occur At     pm1 

      work from changing position. Onset: The symptoms/episode began/occurred just prior to       

      arrival. Duration: The patient or guardian reports a single episode, that is now            

      resolved, but has sensation of shortness of breath. Modifying factors: The symptoms are     

      aggravated by change in position, The symptoms are alleviated by rest. Associated signs     

      and symptoms: Pertinent positives: nausea, near-syncope, Dizziness, Pertinent               

      negatives: anxiety, chest pain, cough, fever, vomiting, Diarrhea. Severity of symptoms:     

      in the emergency department the symptoms have improved Pain is currently a 0 / 10.          

      Patient with history of clotting disorder. Deficient in two factors.                        

                                                                                                  

Historical:                                                                                       

- Allergies:                                                                                      

18:58 Clindamycin;                                                                            ss  

18:58 Latex, Natural Rubber;                                                                  ss  

- Home Meds:                                                                                      

18:58 Lexapro 20 mg Oral tab 1 tab once daily [Active]; Wellbutrin Oral [Active];             ss  

- PMHx:                                                                                           

18:58 Hypertension; PCOS;                                                                     ss  

- PSHx:                                                                                           

18:58 D \T\ C;                                                                                  ss

                                                                                                  

- Immunization history:: Adult Immunizations up to date.                                          

- Social history:: Smoking status: unknown.                                                       

                                                                                                  

                                                                                                  

ROS:                                                                                              

21:05 Constitutional: Negative for fever, chills, and weight loss, Eyes: Negative for injury, pm1 

      pain, redness, and discharge, ENT: Negative for injury, pain, and discharge, Neck:          

      Negative for injury, pain, and swelling.                                                    

21:05 Respiratory: Negative for shortness of breath, cough, wheezing, and pleuritic chest         

      pain, Back: Negative for injury and pain.                                                   

21:05 : Negative for injury, bleeding, discharge, and swelling, MS/Extremity: Negative for      

      injury and deformity, Skin: Negative for injury, rash, and discoloration.                   

21:05 Cardiovascular: Positive for palpitations, Negative for chest pain, edema.                  

21:05 Abdomen/GI: Positive for nausea, Negative for abdominal pain, vomiting, diarrhea,           

      constipation.                                                                               

21:05 Neuro: Positive for dizziness, near syncope, Negative for headache, numbness, tingling,     

      weakness.                                                                                   

                                                                                                  

Exam:                                                                                             

21:05 Constitutional:  This is a well developed, well nourished patient who is awake, alert,  pm1 

      and in no acute distress. Head/Face:  Normocephalic, atraumatic. Chest/axilla:  Normal      

      chest wall appearance and motion.  Nontender with no deformity.  No lesions are             

      appreciated.                                                                                

21:05 Back:  No spinal tenderness.  No costovertebral tenderness.  Full range of motion.          

      Skin:  Warm, dry with normal turgor.  Normal color with no rashes, no lesions, and no       

      evidence of cellulitis. MS/ Extremity:  Pulses equal, no cyanosis.  Neurovascular           

      intact.  Full, normal range of motion.                                                      

21:05 Cardiovascular: Rate: normal, Rhythm: regular, Pulses: no pulse deficits are                

      appreciated, Heart sounds: normal, Edema: is not appreciated.                               

21:05 Respiratory: Exam negative for  acute changes, respiratory distress, shortness of           

      breath, Breath sounds: are clear throughout.                                                

21:05 Abdomen/GI: Exam negative for acute changes, Inspection: abdomen appears normal,            

      Palpation: abdomen is soft and non-tender, in all quadrants.                                

21:05 Neuro: Exam negative for acute changes, Orientation: is normal, Mentation: is normal,       

      Motor: is normal, moves all fours, Sensation: is normal, no obvious gross deficits.         

                                                                                                  

Vital Signs:                                                                                      

18:54  / 99; Pulse 97; Resp 19; Temp 98.6(O); Pulse Ox 98% on R/A;                      ss  

19:00  / 83 RA Supine (auto/lg); Pulse 83; Resp 18; Pulse Ox 100% on R/A;               sg  

19:05  / 83 RA Sitting (auto/lg); Pulse 88;                                             sg  

19:10  / 91 RA Standing (auto/lg); Pulse 88;                                            sg  

20:20  / 70; Pulse 77; Resp 16; Temp 98.6; Pulse Ox 100% on R/A;                        sg  

20:59  / 70; Pulse 65; Resp 18; Pulse Ox 99% ;                                          ea  

                                                                                                  

MDM:                                                                                              

18:56 Patient medically screened.                                                             pm1 

21:06 Data reviewed: vital signs. Data interpreted: Pulse oximetry: on room air is 99 %.      pm1 

      Interpretation: normal. Counseling: I had a detailed discussion with the patient and/or     

      guardian regarding: the historical points, exam findings, and any diagnostic results        

      supporting the discharge/admit diagnosis, lab results, radiology results, the need for      

      outpatient follow up, to return to the emergency department if symptoms worsen or           

      persist or if there are any questions or concerns that arise at home.                       

                                                                                                  

                                                                                             

19:04 Order name: Basic Metabolic Panel; Complete Time: 20:11                                 pm                                                                                             

19:04 Order name: CBC with Diff; Complete Time: 20:07                                         pm                                                                                             

19:04 Order name: LFT's; Complete Time: 20:11                                                 pm                                                                                             

19:04 Order name: Magnesium; Complete Time: 20:11                                             pm                                                                                             

19:04 Order name: NT PRO-BNP; Complete Time: 20:11                                            pm                                                                                             

19:04 Order name: PT-INR; Complete Time: 20:24                                                pm                                                                                             

19:04 Order name: Troponin (emerg Dept Use Only); Complete Time: 20:11                        pm                                                                                             

19:04 Order name: XRAY Chest (1 view); Complete Time: 20:04                                   pm                                                                                             

19:04 Order name: EKG; Complete Time: 19:06                                                   pm                                                                                             

19:04 Order name: Cardiac monitoring; Complete Time: 19:26                                    pm                                                                                             

19:04 Order name: CT Chest For PE Angio; Complete Time: 20:04                                 pm                                                                                             

19:05 Order name: Pregnancy Test, Serum; Complete Time: 20:04                                                                                                                              

19:04 Order name: EKG - Nurse/Tech; Complete Time: 19:26                                      pm                                                                                             

19:04 Order name: IV Saline Lock; Complete Time: 19:25                                        pm                                                                                             

19:04 Order name: Labs collected and sent; Complete Time: 19:26                               pm                                                                                             

19:04 Order name: O2 Per Protocol; Complete Time: 19:26                                       pm                                                                                             

19:04 Order name: O2 Sat Monitoring; Complete Time: 19:26                                     pm                                                                                             

19:04 Order name: Orthostatic Blood Pressure; Complete Time: 19:56                            pm1 

                                                                                                  

Administered Medications:                                                                         

19:33 Drug: NS 0.9% 1000 ml Route: IV; Rate: 1000 ml; Site: left antecubital;                   

21:09 Follow up: Response: No adverse reaction; IV Status: Completed infusion; IV Intake:     ea  

      1000ml                                                                                      

19:33 Drug: Zofran (Ondansetron) 4 mg Route: IVP; Site: left antecubital;                       

21:09 Follow up: Response: No adverse reaction                                                ea  

                                                                                                  

                                                                                                  

Disposition:                                                                                      

                                                                                             

08:36 Co-signature as Attending Physician, Delmar Villarreal MD I agree with the assessment and  [facility] 

      plan of care.                                                                               

                                                                                                  

Disposition:                                                                                      

20 21:07 Discharged to Home. Impression: Palpitations.                                      

- Condition is Stable.                                                                            

- Discharge Instructions: Palpitations.                                                           

                                                                                                  

- Medication Reconciliation Form, Thank You Letter, Antibiotic Education, Prescription            

  Opioid Use form.                                                                                

- Follow up: Emergency Department; When: As needed; Reason: Worsening of condition.               

  Follow up: Private Physician; When: 2 - 3 days; Reason: Recheck today's complaints,             

  Continuance of care, Re-evaluation by your physician.                                           

- Problem is new.                                                                                 

- Symptoms have improved.                                                                         

                                                                                                  

                                                                                                  

                                                                                                  

Signatures:                                                                                       

Dispatcher [employer]                           EDShayan Baird RN RN sg Anderson, Corey, MD MD cha Smirch, Shelby, RN RN ss Marinas, Patrick, NP                    NP   pm1                                                  

Camila Cavanaugh RN RN ea                                                   

                                                                                                  

Corrections: (The following items were deleted from the chart)                                    

                                                                                             

21:10 21:07 2020 21:07 Discharged to Home. Impression: Palpitations. Condition is       ea  

      Stable. Forms are Medication Reconciliation Form, Thank You Letter, Antibiotic              

      Education, Prescription Opioid Use. Follow up: Emergency Department; When: As needed;       

      Reason: Worsening of condition. Follow up: Private Physician; When: 2 - 3 days; Reason:     

      Recheck today's complaints, Continuance of care, Re-evaluation by your physician.           

      Problem is new. Symptoms have improved. pm1                                                 

                                                                                                  

**************************************************************************************************

## 2020-07-28 NOTE — XMS REPORT
Continuity of Care Document

                            Created on:2020



Patient:Pia Hawkins

Sex:Female

:1985

External Reference #:[id number]





Demographics







                          Address                   [address] [place] [address]

 

                          Home Phone                [phone]

 

                          Work Phone                [phone]

 

                          Mobile Phone              [phone]

 

                          Email Address             [email or web address]

 

                          Preferred Language        English

 

                          Marital Status            

 

                          Religion Affiliation     CHR

 

                          Race                      White

 

                          Additional Race(s)        Unavailable

 

                          Ethnic Group              Not  or 









Author







                          Organization              [facility]

t

 

                          Address                   [address]



                                                    [place] [address]

 

                          Phone                     [phone]









Support







                Name            Relationship    Address         Phone

 

                FRAME PILY ZAMORA    Emergency Provider [phone] 



                                                [place] [address] 

 

                ALTAF BAIRD     Primary Care Physician [employer]

S [phone] 



                                                [place] [address] 

 

                CALCOTE         Next of Kin     [address] [facility]   [phone]



                                                [place] [address] 

 

                Calcote         Spouse          [address]   [phone]



                                                [place] [address] 









Care Team Providers







                    Name                Role                Phone

 

                    Dean ZAMORA           Attending Clinician [phone]

 

                    DEBBIE Montoya       Attending Clinician [phone]









Problems

This patient has no known problems.



Allergies, Adverse Reactions, Alerts

This patient has no known allergies or adverse reactions.



Medications

This patient has no known medications.



Procedures

This patient has no known procedures.



Encounters







        Start   End     Encounter Admission Attending Care    Care    Encounter 

Source



        Date/Time Date/Time Type    Type    Clinicians Facility Department ID   

   

 

        [facility]    [id number]

57 



        00:00:00 00:00:00                 [facility]  [id number]         



                                                [place] [id number]         



                                                [facility] [phone]         



                                                nal     [address]             



                                                Office                  



                                                Building                 



                                                One                     

 

        2020 [facility]    [id number]

24 



        00:00:00 00:00:00                 [facility]  [id number]         



                                                [place] [id number]         



                                                [place] [phone]         



                                                nal     044             



                                                Office                  



                                                Building                 



                                                One                     

 

        2020 Office          [facility]    [id number]

86 



        13:23:59 14:46:46 Visit           [place] [id number]         



                                                [place] [id number]         



                                                [place] [phone]         



                                                [facility]     044             



                                                Office                  



                                                Building                 



                                                One                     







Results

This patient has no known results.

## 2020-07-30 NOTE — EKG
Test Date:    2020-07-28               Test Time:    18:55:51

Technician:   JAMAL                                    

                                                     

MEASUREMENT RESULTS:                                       

Intervals:                                           

Rate:         97                                     

OH:           146                                    

QRSD:         76                                     

QT:           350                                    

QTc:          444                                    

Axis:                                                

P:            48                                     

OH:           146                                    

QRS:          19                                     

T:            34                                     

                                                     

INTERPRETIVE STATEMENTS:                                       

                                                     

Normal sinus rhythm

Normal ECG

Compared to ECG 06/22/2018 20:47:47

Sinus tachycardia no longer present



Electronically Signed On 07-30-20 07:32:45 CDT by Ernesto Joshua

## 2023-02-11 ENCOUNTER — HOSPITAL ENCOUNTER (EMERGENCY)
Dept: HOSPITAL 97 - ER | Age: 38
LOS: 1 days | Discharge: HOME | End: 2023-02-12
Payer: COMMERCIAL

## 2023-02-11 DIAGNOSIS — I10: ICD-10-CM

## 2023-02-11 DIAGNOSIS — Z91.048: ICD-10-CM

## 2023-02-11 DIAGNOSIS — Z91.040: ICD-10-CM

## 2023-02-11 DIAGNOSIS — R06.00: Primary | ICD-10-CM

## 2023-02-11 DIAGNOSIS — Z88.3: ICD-10-CM

## 2023-02-11 DIAGNOSIS — K21.9: ICD-10-CM

## 2023-02-11 DIAGNOSIS — Z20.822: ICD-10-CM

## 2023-02-11 DIAGNOSIS — Z91.018: ICD-10-CM

## 2023-02-11 LAB
BUN BLD-MCNC: 10 MG/DL (ref 7–18)
GLUCOSE SERPLBLD-MCNC: 167 MG/DL (ref 74–106)
HCG SERPL-ACNC: 302 MIU/ML (ref 1–3)
HCT VFR BLD CALC: 39.2 % (ref 36–45)
LYMPHOCYTES # SPEC AUTO: 2.3 K/UL (ref 0.7–4.9)
MCV RBC: 88.8 FL (ref 80–100)
PMV BLD: 7.8 FL (ref 7.6–11.3)
POTASSIUM SERPL-SCNC: 4.1 MMOL/L (ref 3.5–5.1)
RBC # BLD: 4.41 M/UL (ref 3.86–4.86)
SARS-COV-2 RNA RESP QL NAA+PROBE: NEGATIVE
TROPONIN I SERPL HS-MCNC: 4.7 PG/ML (ref ?–58.9)

## 2023-02-11 PROCEDURE — 99284 EMERGENCY DEPT VISIT MOD MDM: CPT

## 2023-02-11 PROCEDURE — 85025 COMPLETE CBC W/AUTO DIFF WBC: CPT

## 2023-02-11 PROCEDURE — 93005 ELECTROCARDIOGRAM TRACING: CPT

## 2023-02-11 PROCEDURE — 36415 COLL VENOUS BLD VENIPUNCTURE: CPT

## 2023-02-11 PROCEDURE — 71275 CT ANGIOGRAPHY CHEST: CPT

## 2023-02-11 PROCEDURE — 86900 BLOOD TYPING SEROLOGIC ABO: CPT

## 2023-02-11 PROCEDURE — 84484 ASSAY OF TROPONIN QUANT: CPT

## 2023-02-11 PROCEDURE — 84702 CHORIONIC GONADOTROPIN TEST: CPT

## 2023-02-11 PROCEDURE — 85379 FIBRIN DEGRADATION QUANT: CPT

## 2023-02-11 PROCEDURE — 86901 BLOOD TYPING SEROLOGIC RH(D): CPT

## 2023-02-11 PROCEDURE — 0241U: CPT

## 2023-02-11 PROCEDURE — 80048 BASIC METABOLIC PNL TOTAL CA: CPT

## 2023-02-11 NOTE — XMS REPORT
Continuity of Care Document

                          Created on:2023



Patient:BORIS HAWKINS

Sex:Female

:1985

External Reference #:[id number]





Demographics







                          Address                   [address] [place] [address]

 

                          Home Phone                [phone]

 

                          Work Phone                [phone]

 

                          Mobile Phone              [phone]

 

                          Email Address             [email or web address]

 

                          Preferred Language        English

 

                          Marital Status            Unknown

 

                          Pentecostal Affiliation     Unknown

 

                          Race                      Unknown

 

                          Additional Race(s)        Unavailable



                                                    White

 

                          Ethnic Group              Unknown









Author







                          Organization              [facility]

t

 

                          Address                   [address]



                                                    [place] [address]

 

                          Phone                     [phone]









Support







                Name            Relationship    Address         Phone

 

                Tulio Hawkins Spouse          [address] [facility]   [phone]



                                                [place] [address] 

 

                KRIS MARTINEZ MD Emergency Provider [address] [place] [address] [phone]



                                                [place] [address] 

 

                FRITZ BAIRD Primary Care Physician [facility]

ES [phone]



                                                [address] 



                                                [place] [address] 

 

                TULIO HAWKINS Next of Kin     [address] [facility]   [phone]



                                                [place] [address] 

 

                MD JERRY SALAZAR Primary Care Physician [address] [place] [address] [phone]



                                                [place] [address] 

 

                MD DELMER [facility] Emergency Provider [facility] [phone]

[phone]



                                                [place] [address]  

 

                JOHNSON PEDERSON Unavailable     [address]          Unavailable



                                                [place] [address] 









Care Team Providers







                    Name                Role                Phone

 

                    LIZETH ESPINO    Primary Care Physician Unavailable

 

                    NICKIE COLES        Attending Clinician Unavailable

 

                    Mireya Sharp Attending Clinician [phone]

 

                    Nickie Coles MD     Attending Clinician [phone]

 

                    Sheri So  Attending Clinician [phone]

 

                    AZUL TOMPKINS        Attending Clinician Unavailable

 

                    Azul Almaraz    Attending Clinician [phone]

 

                    Unknown, Attending  Attending Clinician Unavailable

 

                    UNKNOWN, ATTENDING  Attending Clinician Unavailable

 

                    A_Minor              Attending Clinician Unavailable

 

                    G_Papshaun            Attending Clinician Unavailable

 

                    Doctor Unassigned, No Name Attending Clinician Unavailable

 

                    Lizeth Montoya Attending Clinician [phone]

 

                    Skinny Chau DO Attending Clinician [phone]

 

                    Donovan Moran MD  Attending Clinician [phone]

 

                    DEBBIE_Minor              Admitting Clinician Unavailable

 

                    SABINO_Ray            Admitting Clinician Unavailable









Payers







           Payer Name Policy Type Policy Number Effective Date Expiration Date DONALD cruz

 

           [employer]            [id number] 2020-10-01            



           OUT OF NETWORK                       00:00:00              

 

           [employer] -            [id number] 2020-10-01            



           EV BENEFITS                       00:00:00              



           MANAGEMENT                                             

 

           [employer]                 [id number] 2016            



                                            00:00:00              







Problems







       Condition Condition Condition Status Onset  Resolution Last   Treating Co

mments 

Source



       Name   Details Category        Date   Date   Treatment Clinician        



                                                 Date                 

 

       Hypertensi Hypertensi Problem Active                              M

[facility]



       ve     ve                   4-18                               da



       disorder Disorder               00:00:                             Medica

l



                                   00                                 Group

 

       Body mass Body Mass Problem Active                              Mat

agor



       index 40+ Index 40+               4-18                               da



       - severely - Severely               00:00:                             Me

dical



       obese  Obese                00                                 Group

 

       Bacterial Bacterial Problem Active                              Mat

agor



       vaginosis Vaginosis               4-18                               da



                                   00:00:                             Medical



                                   00                                 Group

 

       Benign Benign Problem Active                              [facility]



       essential Essential               9-22                               da



       hypertensi Hypertensi               00:00:                             Me

dical



       on     on                   00                                 Group

 

       Nicotine Nicotine Problem Active                              Mat[facility]

or



       dependence Dependence               3-31                               da



                                   00:00:                             Medical



                                   00                                 Group

 

       Chronic Chronic Problem Active                              [facility]



       depression Depression               3-31                               da



                                   00:00:                             Medical



                                   00                                 Group

 

       Furuncle Furuncle Problem Active                              [facility]

or



       of axilla of Axilla               3-31                               da



                                   00:00:                             Medical



                                   00                                 Group

 

       Blood  Blood  Problem Active                              [facility]



       coagulatio Coagulatio               3-31                               da



       n disorder n Disorder               00:00:                             Me

dical



                                   00                                 Group

 

       Polycystic Polycystic Problem Active                              M

[facility]



       ovaries Ovaries               2-10                               da



                                   00:00:                             Medical



                                   00                                 Group

 

       Type 2 Type 2 Problem Active                              [facility]



       diabetes Diabetes               2-10                               da



       mellitus Mellitus               00:00:                             Medica

l



       without without               00                                 Group



       complicati Complicati                                                  



       on     on                                                      

 

       Anxiety Anxiety Disease Active                              Univers



                                   7-26                               ity of



                                   00:00:                             [place]



                                   00                                 Medical



                                                                      Branch

 

       Essential Essential Disease Active                              Uni

vers



       hypertensi hypertensi                                              it

y of



       on     on                   00:00:                             [place]



                                   00                                 Medical



                                                                      Branch

 

       Coagulatio Coagulatio Disease Active                              U

nivers



       n defect n defect                                              ity of



                                   00:00:                             [place]



                                   00                                 Medical



                                                                      Branch

 

       Obesity Obesity Problem Active                                    [facility]



                                                                      da



                                                                      Medical



                                                                      Group







Allergies, Adverse Reactions, Alerts







       Allergy Allergy Status Severity Reaction(s) Onset  Inactive Treating Comm

ents 

Source



       Name   Type                        Date   Date   Clinician        

 

       CLINDAMY DRUG   Active        Unknown-Cmnt                       Un

suhail



       CORDELL    INGREDI                                              ity of



                                          00:00:                      [place]



                                          00                          Medical



                                                                      Branch

 

       ERYTHROM DRUG   Active        Unknown-Cmnt                       Un

suhail



       YCIN                                                       ity of



                                          00:00:                      [place]



                                          00                          Medical



                                                                      Branch

 

       LATEX  DRUG   Active        Rash                         Univers



              INGREDI                                              ity of



                                          00:00:                      [place]



                                          00                          Medical



                                                                      Branch

 

       Clindamy Propensi Active        Unknown -                       Uni

vers



       cordell    ty to                See comments                         ity 

of



              adverse                      00:00:                      [place]



              reaction                      00                          Medical



              s                                                       Branch

 

       Erythrom Propensi Active        Unknown -                       Uni

vers



       ycin   ty to                See comments                         ity 

of



              adverse                      00:00:                      [place]



              reaction                      00                          Medical



              s                                                       Branch

 

       Latex  Propensi Active        Rash                         Univers



              ty to                                               ity of



              adverse                      00:00:                      [place]



              reaction                      00                          Medical



              s                                                       Branch

 

       Clindamy Allergy Active        Anaphylaxis                             Ma

tagor



       cordell    to                                                      da



              substanc                                                  Medical



              e                                                       Group

 

       Erythrom Allergy Active        Anaphylaxis                             Ma

ingrid



       ycin   to                                                      da



       Base   substanc                                                  Medical



              e                                                       Group

 

       Latex  Allergy Active                                           [facility]



              to                                                      da



              substanc                                                  Medical



              e                                                       Group







Social History







           Social Habit Start Date Stop Date  Quantity   Comments   Source

 

           History [facility]                                             University o

f



           [facility]                                             [facility]



                                                                  [place]

 

           History [facility]                                             University o

f



           Alcohol Std Drinks                                             [facility]



                                                                  Branch

 

           History [facility]                                             University o

f



           Alcohol Binge                                             [place] [facility]

al



                                                                  Branch

 

           Exposure to                       Not sure              University of



           SARS-CoV-2 (event)                                             [facility]

 

           History of tobacco                       Cigarette Smoker            

University of



           use                                                    [facility]

 

           Alcohol intake 2021 Current drinker            Unive

rsity of



                      00:00:00   00:00:00   of alcohol            [facility]



                                            (finding)             [place]

 

           Alcohol Comment 2016 occasional            Universit

y of



                      00:00:00   00:00:00                         [facility]

 

           Tobacco use and 2016 Never used            Universit

y of



           exposure   00:00:00   00:00:00                         [facility]

 

           Cigarettes smoked 2016                       Univers

ity of



           current (pack per 00:00:00   00:00:00                         [facility]

edical



           day) - Reported                                             Branch

 

           Cigarette  2016                       University of



           pack-years 00:00:00   00:00:00                         [facility]

 

           Sex Assigned At 1985                       Universit

y of



           Birth      00:00:00   00:00:00                         [facility]









                Smoking Status  Start Date      Stop Date       Source

 

                Former Smoker                                   [facility]

l Group

 

                Current every day smoker 2016 00:00:00                 Uni

versity of [facility]







Medications







       Ordered Filled Start  Stop   Current Ordering Indication Dosage Frequency

 Signature

                    Comments            Components          Source



     Medication Medication Date Date Medication? Clinician                (SIG) 

          



     Name Name                                                   

 

     methylPREDN      2021- No        [id number] 80mg                     

Univers



     ISolone                                               ity of



     acetate      03:30: 02:26                                         [place]



     (DEPO-MEDRO      00   :00                                          Medical



     L)                                                          Branch



     injection                                                        



     80 mg                                                        

 

     methylPREDN      2021- No        [id number] 80mg      80 mg,         

  Univers



     ISolone                                Intramuscu           ity o

f



     acetate      03:30: 02:26                          lar, ONCE,           Denis

as



     (DEPO-MEDRO      00   :00                           1 dose, On           Me

dical



     L)                                           Tu            Branch



     injection                                         21           



     80 mg                                         at 2130,           



                                                  Routine           

 

     albuterol      2021- No        [id number] 2{puff}                    

 Univers



     (VENTOLIN)                                               ity of



     inhaler 2      02:16: 02:26                                         [place]



     Puff      00   :00                                          [facility]

 

     albuterol      2021- No        [id number] 2{puff}      2 Puff,       

    Univers



     (VENTOLIN)                                Inhalation           it

y of



     inhaler 2      02:16: 02:26                          , ONCE, 1           Te

xas



     Puff      00   :00                           dose, On           Medical



                                                  e            Branch



                                                  21           



                                                  at 2030,           



                                                  Routine           

 

     ampicillin      2021- No        [id number] 500mg      Take 1          

 Univers



     500 mg                                capsule by           ity of



     capsule      00:00: 05:59                          mouth           [place]



               00   :00                           every 6           Medical



                                                  (six)           Branch



                                                  hours for           



                                                  7 days.           

 

     Nitrofurant      2021- No        [id number] 100mg      Take 1         

  Univers



     oin&Nit.      2-23 12-31                          capsule by           ity 

of



     Macrocryst      00:00: 05:59                          mouth 2           Denis

as



     (MACROBID)      00   :00                           (two)           Medical



     100 mg                                         times           Branch



     capsule                                         daily for           



                                                  7 days.           

 

     ESCITALOPRA            Yes       [id number]           Take 1           

Univers



     M OXALATE      8-31                               tablet by           ity o

f



     20 mg      00:00:                               mouth once           [place]



     tablet      00                                 daily           Medical



                                                                 Branch

 

     aspirin            Yes            81mg      Take 81 mg           Univ

ers



     (ASPIRIN      2-19                               by mouth           ity of



     LOW DOSE)      13:43:                               daily.           [place]



     81 mg EC      13                                                Medical



     tablet                                                        Branch

 

     lisinopril-            Yes       [id number] 1{tbl}      Take 1         

  Univers



     hydrochloro      2-19                               tablet by           ity

 of



     thiazide      13:43:                               mouth           [place]



     20-12.5 mg      13                                 every           Medical



     per tablet                                         morning.           Branc

h

 

     buPROPion            Yes       [id number]           Start 1           U

nivers



     SR        4-16                               tab PO           ity of



     (WELLBUTRIN      00:00:                               daily x 3           T

exas



     SR) 150 mg      00                                 days, then           Med

ical



     SR tablet                                         increase           Branch



                                                  to 1 tab           



                                                  PO BID.           

 

     escitalopra escitalopra           No                       escitalopr      

     [place]



     m 20 mg m 20 mg                                    am 20 mg           da



     tablet TAKE tablet TAKE                                    tablet          

 Medical



     1 TABLET BY 1 TABLET BY                                    TAKE 1          

 Group



     MOUTH ONCE MOUTH ONCE                                    TABLET BY         

  



     DAILY DAILY                                    MOUTH ONCE           



     PATIENT PATIENT                                    DAILY           



     MUST BE MUST BE                                    PATIENT           



     SEEN FOR SEEN FOR                                    MUST BE           



     FURTHER FURTHER                                    SEEN FOR           



     REFILLS REFILLS                                    FURTHER           



                                                  REFILLS           

 

     lisinopril lisinopril           No             1    Q1D  lisinopril        

   [facility]



     10   10                                      10             da



     mg-hydrochl mg-hydrochl                                    mg-hydroch      

     Medical



     orothiazide orothiazide                                    lorothiazi      

     Group



     12.5 mg 12.5 mg                                    de 12.5 mg           



     tablet Take tablet Take                                    tablet          

 



     1 tablet 1 tablet                                    Take 1           



     every day every day                                    tablet           



     by oral by oral                                    every day           



     route. route.                                    by oral           



                                                  route.           

 

     metformin metformin           No             1    BID  metformin           

[facility]



     500 mg 500 mg                                    500 mg           da



     tablet Take tablet Take                                    tablet          

 Medical



     1 tablet 1 tablet                                    Take 1           Group



     twice a day twice a day                                    tablet          

 



     by oral by oral                                    twice a           



     route. route.                                    day by           



                                                  oral           



                                                  route.           

 

     Wellbutrin Wellbutrin           No             1    BID  Wellbutrin        

   [facility]



      mg  mg                                     mg           

da



     tablet, 12 tablet, 12                                    tablet, 12        

   Medical



     hr   hr                                      hr             Group



     sustained-r sustained-r                                    sustained-      

     



     elease Take elease Take                                    release         

  



     1 tablet 1 tablet                                    Take 1           



     twice a day twice a day                                    tablet          

 



     by oral by oral                                    twice a           



     route. route.                                    day by           



                                                  oral           



                                                  route.           







Immunizations







           Ordered    Filled Immunization Date       Status     Comments   Sour

e



           Immunization Name Name                                        

 

           HEP B, Adult Dosage            2018 Completed             [facility]

[facility]



                                 00:00:00                         [facility]

 

           Hep B, adult Hep B, adult 2018 Completed             [facility]



                                 00:00:00                         Group

 

           TDAP                  2018 Completed             [facility]



                                 00:00:00                         [facility]

 

           Tdap       Tdap       2018 Completed             [facility]

jose ramon



                                 00:00:00                         Group







Vital Signs







             Vital Name   Observation Time Observation Value Comments     Source

 

             Systolic blood 2021 02:00:00 142 mm[Hg]                [facility]

sity of



             pressure                                            [facility]

 

             Diastolic blood 2021 02:00:00 101 mm[Hg]                Un[facility]

rsity of



             pressure                                            [facility]

 

             Heart rate   2021 02:00:00 113 /min                  [facility]

 

             Body temperature 2021 02:00:00 37.61 Nubia                 [facility]

 

             Body height  2021 02:00:00 160 cm                    [facility]

 

             Body weight  2021 02:00:00 122.046 kg                [facility]

 

             BMI          2021 02:00:00 47.66 kg/m2               [facility]

 

             Oxygen saturation in 2021 02:00:00 96 /min                   

[facility]



             Arterial blood by                                        [facility]

jose ramon



             Pulse oximetry                                        [facility]

 

             BP Diastolic 2021-07-15 00:00:00 99 mm[Hg]                 [facility]

[facility]



                                                                 Group

 

             Height       2021-07-15 00:00:00 63 [in_i]                 [facility]

[facility]



                                                                 Group

 

             BMI (Body Mass 2021-07-15 00:00:00 46.1 kg/m2                [facility]

rd Medical



             Index)                                              Group

 

             BP Systolic  2021-07-15 00:00:00 150 mm[Hg]                [facility]

[facility]



                                                                 Group

 

             Body Weight  2021-07-15 00:00:00 4168 [oz_av]              [facility]

[facility]



                                                                 Group







Procedures







                Procedure       Date / Time Performed Performing Clinician [facility]

e

 

                I&d Abscess Subfascial 2017 00:00:00                 [facility]



                                                                [facility]

 

                Dilation and Curettage 2009 00:00:00                 [facility]



                                                                [facility]

 

                Dilation and Curettage 2004 00:00:00                 [facility]



                                                                [facility]

 

                Dilation and Curettage 1999 00:00:00                 [facility]



                                                                Group







Encounters







        Start   End     Encounter Admission Attending Care    Care    Encounter 

Source



        Date/Time Date/Time Type    Type    Clinicians Facility Department ID   

   

 

        [id number]

[facility]



        20[facility]00[facility]00 20[facility]30:09                 [facility]

 

        2021 Urgent          Mireya Akhtar [facility]    1.2.840.

114 [id number] 

Univers



        20:00:00 20:30:09 Pablito Coles [facility]  350.1.13.10      

   ity [facility] [id number]         [place]

as



                                                MARTINA?BLEA [phone]         [address]



                                                MEDICAL                 



                                                OFFICE                  



                                                [facility]                 

 

        [facility]    [id number]

[facility]



        20:00:00 20:30:09                 [facility]

 

        2021 [facility]         [facility]    1.2.840.114 [id number]

[id number] Univers



        00:00:00 00:00:00                 [facility]  350.1.13.10         it

y [facility] [id number]         [place]

as



                                                MARTINA?BLEA [phone]         [address]



                                                MEDICAL                 



                                                OFFICE                  



                                                BUILDING                 

 

        [facility]       [facility]    [id number]

049 Univers



        14:40:00 15:47:35                 AZUL                           [facility]

 

        2021 Urgent          TurnerAzul [facility]    1.2.840.114 [id number]

[id number] Univers



        14:40:00 15:00:00 Care            Unknown, [facility]  350.1.13.10

         ity [facility] [id number]         [place]

as



                                                MARTINA?BLEA [phone]         Me

karen



                                                [address]



                                                MEDICAL                 



                                                OFFICE                  



                                                BUILDING                 

 

        2021 Outpatient R       UNKNOWN, [facility]    [id number]

[id number] Univers



        14:40:00 14:40:00                 ATTENDING                         ity 

of



                                                                        [facility]

 

        2021 Outpatient         A_Byrd  MMG     [facility]     [id number]

021 [place]



        11:02:00 11:02:00                                         1117    



                                                                        Medical



                                                                        Group

 

        2021-10-28 2021-10-28 Outpatient         A_Byrd  MMG     MM     [id number]

021 [place]



        01:57:00 01:57:00                                         1028    



                                                                        Medical



                                                                        Group

 

        2021 [facility]         [facility]     [id number]

021 [place]



        01:40:00 01:40:00                                         0923    [facility]

 

        2021 Outpatient         A_Byrd  MMG     [facility]     [id number]

021 [place]



        01:31:00 01:31:00                                         0819    [facility]

 

        2021-07-15 2021-07-15 Outpatient         A_Byrd  MMG     [facility]     [id number]

021 [place]



        04:46:00 04:46:00                                         0715    [facility]

 

        2021-07-15 2021-07-15 Outpatient         A_Byrd  MMG     [facility]     [id number]

021 [place]



        04:46:00 04:46:00                                         0717    [facility]

 

        2021-07-15 2021-07-15 Jerry LONDON                  [facility]     [place] -    [id number] M

[facility]



        00:00:00 00:00:00 MD Minor:                         [facility]         [address]         Group



                        [facility]         



                        [facility] [address],                         [facility]         



                        [place]                                              



                        [address]                                         



                        , Ph.                                           



                        [phone]                                         

 

        2021 Outpatient         [facility]     2021 [place]



        04:06:00 04:06:00                                         0714    [facility]

 

        2021 Refill          Doctor  [facility]    1.2.840.114 [id number]

05 



        00:00:00 00:00:00                 Unassigned, Health  350.1.13.10       

  



                                        [place] [id number]         



                                                [facility] [phone]         



                                                [place] [address]             



                                                Office                  



                                                Building                 



                                                One                     

 

        2021 Refill          Alejandra, [facility]    1.2.840.114 [id number]

04 



        00:00:00 00:00:00                 [facility]  350.1.13.10         



                                                [place] [id number]         



                                                [facility] [phone]         



                                                [place] [address]             



                                                Office                  



                                                Building                 



                                                One                     

 

        2021 Refill          Doctor  [facility]    1.2.840.114 [id number]

05 Univers



        00:00:00 00:00:00                 Unassigned, Health  350.1.13.10       

  ity of



                                        [place] [id number]         [place]

as



                                                [facility] [phone]         [address]



                                                Office                  



                                                Building                 



                                                One                     

 

        2021 Refill          Alejandra, [facility]    1.2.840.114 [id number]

04 Univers



        00:00:00 00:00:00                 [facility]  350.1.13.10         i

ty of



                                                [place] [id number]         [place]

as



                                                [facility] [phone]         [address]



                                                Office                  



                                                Building                 



                                                One                     

 

        2021 Patient         Isac  [facility]    1.2.840.114 [id number]

79 



        00:00:00 00:00:00 Outreach         Skinny  PRIMARY 350.1.13.10         



                                        Dante  CARE    [id number]         



                                                PAVILLION [phone]         



                                                        [facility]             

 

        2021 [facility]         [facility]    1.2.840.114 [id number]

79 Univers



        00:00:00 00:00:00 Outreach         Skinny  PRIMARY 350.1.13.10         i

ty of



                                        Dante  CARE    [id number]         [place]

[place]



                                                PAVILLION [phone]         Me

dical



                                                        [address]             [place]

 

        2020 Outpatient         G_Pappas MMG     [facility]     2021 [place]



        02:19:00 02:19:00                                         0708    [facility]



                                                                        Group

 

        2020 Outpatient         G_Pappas MMG     MMG     2020 [place]



        02:19:00 02:19:00                                         1118    da



                                                                        Medical



                                                                        Group

 

        2020 Refill          Alejandra, [facility]    1.2.840.114 [id number]

72 



        00:00:00 00:00:00                 [facility]  350.1.13.10         



                                                [place] [id number]         



                                                [facility] [phone]         



                                                [place] [address]             



                                                Office                  



                                                Building                 



                                                One                     

 

        [facility]    1.2.840.114 [id number]

79 



        00:00:00 00:00:00                 [facility]  350.1.13.10         



                                                [place] [id number]         



                                                [facility] [phone]         



                                                [place] [address]             



                                                Office                  



                                                Building                 



                                                One                     

 

        2020 Refrenetta Espino, [facility]    1.2.840.114 [id number]

72 Univers



        00:00:00 00:00:00                 [facility]  350.1.13.10         i

ty of



                                                [place] [id number]         [place]

as



                                                [facility] [phone]         [address]



                                                Office                  



                                                Building                 



                                                One                     

 

        [facility]    1.2.840.114 [id number]

79 Univers



        00:00:00 00:00:00                 [facility]  350.1.13.10         it

y of



                                                [place] [id number]         [place]

as



                                                [facility]fess[facility] [phone]         [address]



                                                Office                  



                                                Building                 



                                                One                     

 

        [facility]    1.2.840.114 [id number]

57 



        00:00:00 00:00:00                 [facility]  350.1.13.10         



                                                [place] [id number]         



                                                [facility] [phone]         



                                                [place] [address]             



                                                Office                  



                                                Building                 



                                                One                     

 

        2020 [facility]    1.2.840.114 [id number]

57 Univers



        00:00:00 00:00:00                 [facility]  350.1.13.10         it

y of



                                                [place] [id number]         [place]

as



                                                [facility] [phone]         [address]



                                                Office                  



                                                Building                 



                                                One                     

 

        2020 Refill          Alejandra, [facility]    1.2.840.114 [id number]

24 



        00:00:00 00:00:00                 [facility]  350.1.13.10         



                                                [place] [id number]         



                                                [facility] [phone]         



                                                [address]                 



                                                One                     

 

        2020 Refill          Alejandra, [facility]    1.2.840.114 [id number]

24 Univers



        00:00:00 00:00:00                 [facility]  350.1.13.10         i

ty of



                                                [place] [id number]         [place]

as



                                                [facility] [phone]         Me

dical



                                                [address]



                                                Office                  



                                                Building                 



                                                One                     

 

        2020 Office          Alejandra, [facility]    1.2.840.114 [id number]

86 



        13:23:59 14:46:46 Visit           [facility]  350.1.13.10         



                                                [place] [id number]         



                                                [facility] [phone]         



                                                [place] [address]             



                                                Office                  



                                                [facility]                 



                                                One                     

 

        2020 Office          Alejandra, [facility]    1.2.840.114 [id number]

86 Univers



        13:23:59 14:46:46 Visit           [facility]  350.1.13.10         i

ty of



                                                [place] [id number]         [place]is

as



                                                [facility] [phone]         Me

dic[address]                     

 

        2020 Orders          Doctor  [facility]    1.2.840.114 [id number]

14 Univers



        00:00:00 00:00:00 Only            Unassigned, FRANCHESCA   350.1.13.10       

  ity of



                                        [facility] [place] [id number]         [place]

as



                                                        [phone]         Medi

jose ramon



                                                        009             Branch







Results

This patient has no known results.

## 2023-02-12 VITALS — DIASTOLIC BLOOD PRESSURE: 76 MMHG | SYSTOLIC BLOOD PRESSURE: 118 MMHG

## 2023-02-12 VITALS — OXYGEN SATURATION: 100 %

## 2023-02-12 VITALS — TEMPERATURE: 98.3 F

## 2023-02-12 NOTE — RAD REPORT
EXAM DESCRIPTION:    Chest For Pe Angio

 

CLINICAL HISTORY:  38 years   Female   Chest pain

 

COMPARISON:  None

 

TECHNIQUE:  Images were obtained in axial, sagittal, and coronal planes. Intravenous contrast was adm
inistered. 3-D MIP imaging was performed.

This exam was performed according to our departmental dose-optimization program which includes use of
 Automated Exposure Control, adjustment of the mA and/or kV according to patient size and/or use of i
terative reconstruction technique.

 

FINDINGS:  No filling defects pulmonary arteries bilaterally. No aortic dissection or dilatation. No 
right heart strain.

No pericardial or pleural effusions bilaterally. No adenopathy.

No lung parenchymal infiltrates or nodules seen. No pneumothorax.

No abnormality upper abdomen.

No acute osseous abnormality.

 

IMPRESSION:  No evidence for pulmonary embolus. No aortic dissection or dilatation. No infiltrate see
n.

 

Electronically signed by:   Cindy Freire MD   2/11/2023 11:22 PM CST Workstation: [id number]

 

 

 

Due to temporary technical issues with the PACS/Fluency reporting system, reports are being signed by
 the in house radiologists without review as a courtesy to insure prompt reporting. The interpreting 
radiologist is fully responsible for the content of the report.

## 2023-02-12 NOTE — ER
Nurse's Notes                                                                                     

 [facility]                                                                 

Name: Pia Hawkins                                                                             

Age: 38 yrs                                                                                       

Sex: Female                                                                                       

: 1985                                                                                   

MRN: [id number]                                                                                   

Arrival Date: 2023                                                                          

Time: 21:06                                                                                       

Account#: [id number]                                                                            

Bed 5                                                                                             

Private MD:                                                                                       

Diagnosis: Dyspnea;Gastro-esophageal reflux disease without esophagitis                           

                                                                                                  

Presentation:                                                                                     

                                                                                             

21:09 Chief complaint: Patient states: C/o chest pain when taking a deep breath, c/o SOB upon ll3 

      exertion, and dizziness, c/o 3 near syncopal episodes yesterday. Coronavirus screen:        

      Vaccine status: Patient reports receiving the 2nd dose of the covid vaccine. At this        

      time, the client does not indicate any symptoms associated with coronavirus-19. Ebola       

      Screen: No symptoms or risks identified at this time. Initial Sepsis Screen: Does the       

      patient meet any 2 criteria? No. Patient's initial sepsis screen is negative. Does the      

      patient have a suspected source of infection? No. Patient's initial sepsis screen is        

      negative. Risk Assessment: Do you want to hurt yourself or someone else? Patient            

      reports no desire to harm self or others. Onset of symptoms was February 10, 2023. Care     

      prior to arrival: Medication(s) given: ASA, 81 mg, x 1, Progesterone cream.                 

21:09 Method Of Arrival: Ambulatory                                                           ll3 

21:09 Acuity: QUINTIN 2                                                                           ll3 

                                                                                                  

OB/GYN:                                                                                           

21:13 LMP 2022                                                                          ll3 

                                                                                                  

Historical:                                                                                       

- Allergies:                                                                                      

21:13 Clindamycin;                                                                            ll3 

21:13 Latex, Natural Rubber;                                                                  ll3 

21:13 Cinnamon;                                                                               ll3 

- Home Meds:                                                                                      

21:13 None [Active];                                                                          ll3 

- PMHx:                                                                                           

21:13 Hypertension; PCOS; Clotting disorder;                                                  ll3 

- PSHx:                                                                                           

21:13 None;                                                                                   ll3 

                                                                                                  

- Immunization history:: Client reports receiving the 2nd dose of the Covid vaccine.              

- Social history:: Smoking status: Patient/guardian denies using tobacco.                         

                                                                                                  

                                                                                                  

Screenin/12                                                                                             

00:38 [facility] ED Fall Risk Assessment (Adult) History of falling in the last 3 months,       jb4 

      including since admission No falls in past 3 months (0 pts) Confusion or Disorientation     

      No (0 pts) Score/Fall Risk Level 0 - 2 = Low Risk Oriented to surroundings, Maintained      

      a safe environment. Abuse screen: Denies threats or abuse. Nutritional screening: No        

      deficits noted. Tuberculosis screening: No symptoms or risk factors identified.             

                                                                                                  

Assessment:                                                                                       

                                                                                             

21:15 General: Appears in no apparent distress. uncomfortable, Behavior is calm, cooperative. jb4 

      Pain: Complains of pain in chest Pain does not radiate. Pain currently is 2 out of 10       

      on a pain scale. Neuro: Level of Consciousness is awake, alert, obeys commands,             

      Oriented to person, place, time, situation. Cardiovascular: Patient's skin is warm and      

      dry. Respiratory: Airway is patent Respiratory effort is even, unlabored, Respiratory       

      pattern is regular, symmetrical. GI: No deficits noted. : No deficits noted. EENT: No     

      deficits noted. Derm: Skin Skin is pink, warm \T\ dry. Musculoskeletal: Circulation,        

      motion, and sensation intact. Range of motion: intact in all extremities.                   

22:30 Reassessment: Patient appears in no apparent distress at this time. Patient and/or      jb4 

      family updated on plan of care and expected duration. Pain level reassessed. Patient is     

      alert, oriented x 3, equal unlabored respirations, skin warm/dry/pink.                      

23:36 Reassessment: Patient appears in no apparent distress at this time. Patient and/or      jb4 

      family updated on plan of care and expected duration. Pain level reassessed. Patient is     

      alert, oriented x 3, equal unlabored respirations, skin warm/dry/pink. Patient states       

      feeling better.                                                                             

                                                                                             

00:38 Reassessment: Patient appears in no apparent distress at this time. Patient and/or      jb4 

      family updated on plan of care and expected duration. Pain level reassessed. Patient is     

      alert, oriented x 3, equal unlabored respirations, skin warm/dry/pink.                      

                                                                                                  

Vital Signs:                                                                                      

                                                                                             

21:09  / 84; Pulse 108; Resp 22; Temp 98.3(O); Pulse Ox 98% on R/A; Weight 113.4 kg     ll3 

      (R); Height 5 ft. 3 in. (160.02 cm); Pain 2/10;                                             

22:00  / 82; Pulse 83; Resp 16; Pulse Ox 100% on R/A;                                   jb4 

23:15  / 76; Pulse 82; Resp 16; Pulse Ox 100% on R/A;                                   jb4 

21:09 Body Mass Index 44.29 (113.40 kg, 160.02 cm)                                            ll3 

                                                                                                  

ED Course:                                                                                        

21:06 Patient arrived in ED.                                                                  jb4 

21:07 Олег Wharton DO is Attending Physician.                                                ms3 

21:13 Triage completed.                                                                       ll3 

21:13 Arm band placed on Patient placed in an exam room, on a stretcher, on pulse oximetry.   ll3 

21:22 Cristian Samuel, RN is Primary Nurse.                                                     jb4 

21:22 Ana Laura Montiel [facility]-C is [facility]                                                          snw 

21:30 Patient has correct armband on for positive identification. Bed in low position. Call   jb4 

      light in reach. Side rails up X 1. Client placed on continuous cardiac and pulse            

      oximetry monitoring. NIBP monitoring applied. Cardiac monitor on.                           

21:30 No provider procedures requiring assistance completed. IV discontinued, intact,         jb4 

      bleeding controlled, No redness/swelling at site. Pressure dressing applied.                

23:06 CT Chest For PE Angio In Process Unspecified.                                           EDMS

                                                                                                  

Administered Medications:                                                                         

23:26 Not Given (Physician Discretion): NS 0.9% 1000 ml IV at 75 ml/hr continuous             jb4 

23:27 Drug: NS 0.9% 500 ml Route: IV; Rate: bolus; Site: right antecubital;                   jb4 

                                                                                                  

                                                                                                  

Medication:                                                                                       

02/12                                                                                             

00:38 VIS not applicable for this client.                                                     jb4 

                                                                                                  

Outcome:                                                                                          

00:25 Discharge ordered by MD.                                                                snw 

00:39 Discharged to home ambulatory.                                                          jb4 

00:39 Condition: stable                                                                           

00:39 Discharge instructions given to patient, Instructed on discharge instructions, follow       

      up and referral plans. medication usage, Demonstrated understanding of instructions,        

      follow-up care, medications, Prescriptions given X 2.                                       

00:39 Patient left the ED.                                                                    jb4 

                                                                                                  

Signatures:                                                                                       

Dispatcher [employer]                           EDMS                                                 

Ana Laura Montiel FNP-C FNP-Csnw                                                  

Cristian Samule, RN                       RN   jb4                                                  

Олег Wharton DO                        DO   ms3                                                  

Cathie Rosales RN                      RN   3                                                  

                                                                                                  

**************************************************************************************************

## 2023-02-12 NOTE — EDPHYS
Physician Documentation                                                                           

 [facility]                                                                 

Name: Pia Hawkins                                                                             

Age: 38 yrs                                                                                       

Sex: Female                                                                                       

: 1985                                                                                   

MRN: [id number]                                                                                   

Arrival Date: 2023                                                                          

Time: 21:06                                                                                       

Account#: [id number]                                                                            

Bed 5                                                                                             

Private MD:                                                                                       

ED Physician Олег Wharton                                                                         

HPI:                                                                                              

                                                                                             

21:23 This 38 yrs old Female presents to ER via Ambulatory with complaints of chest pain and  snw 

      sob.                                                                                        

21:23 The patient or guardian reports cough, that is constant, shortness of breath, cough.    snw 

      Onset: The symptoms/episode began/occurred 2 day(s) ago, and became persistent.             

      Severity of symptoms: At their worst the symptoms were moderate. Associated signs and       

      symptoms: Pertinent positives: chest pain, with cough, with breathing. The patient has      

      experienced similar episodes in the past. The patient has not recently seen a               

      physician. recently found out she is pregnant.                                              

                                                                                                  

OB/GYN:                                                                                           

21:13 LMP 2022                                                                          ll3 

                                                                                                  

Historical:                                                                                       

- Allergies:                                                                                      

21:13 Clindamycin;                                                                            ll3 

21:13 Latex, Natural Rubber;                                                                  ll3 

21:13 Cinnamon;                                                                               ll3 

- Home Meds:                                                                                      

21:13 None [Active];                                                                          ll3 

- PMHx:                                                                                           

21:13 Hypertension; PCOS; Clotting disorder;                                                  ll3 

- PSHx:                                                                                           

21:13 None;                                                                                   ll3 

                                                                                                  

- Immunization history:: Client reports receiving the 2nd dose of the Covid vaccine.              

- Social history:: Smoking status: Patient/guardian denies using tobacco.                         

                                                                                                  

                                                                                                  

ROS:                                                                                              

22:30 Constitutional: Negative for fever, chills, and weight loss, Eyes: Negative for injury, snw 

      pain, redness, and discharge, ENT: Negative for injury, pain, and discharge, Neck:          

      Negative for injury, pain, and swelling.                                                    

22:30 Abdomen/GI: Negative for abdominal pain, nausea, vomiting, diarrhea, and constipation,      

      Back: Negative for injury and pain, : Negative for injury, bleeding, discharge, and       

      swelling, MS/Extremity: Negative for injury and deformity, Skin: Negative for injury,       

      rash, and discoloration, Neuro: Negative for headache, weakness, numbness, tingling,        

      and seizure, Psych: Negative for depression, anxiety, suicide ideation, homicidal           

      ideation, and hallucinations.                                                               

22:30 Cardiovascular: Positive for chest pain.                                                    

22:30 Respiratory: Positive for dyspnea on exertion, shortness of breath.                         

                                                                                                  

Exam:                                                                                             

22:30 Constitutional:  This is a well developed, well nourished patient who is awake, alert,  snw 

      and in no acute distress. Head/Face:  Normocephalic, atraumatic. Eyes:  Pupils equal        

      round and reactive to light, extra-ocular motions intact.  Lids and lashes normal.          

      Conjunctiva and sclera are non-icteric and not injected.  Cornea within normal limits.      

      Periorbital areas with no swelling, redness, or edema. ENT:  Nares patent. No nasal         

      discharge, no septal abnormalities noted.  Tympanic membranes are normal and external       

      auditory canals are clear.  Oropharynx with no redness, swelling, or masses, exudates,      

      or evidence of obstruction, uvula midline.  Mucous membranes moist. Neck:  Trachea          

      midline, no thyromegaly or masses palpated, and no cervical lymphadenopathy.  Supple,       

      full range of motion without nuchal rigidity, or vertebral point tenderness.  No            

      Meningismus. Chest/axilla:  Normal chest wall appearance and motion.  Nontender with no     

      deformity.  No lesions are appreciated.                                                     

22:30 Abdomen/GI:  Soft, non-tender, with normal bowel sounds.  No distension or tympany.  No     

      guarding or rebound.  No evidence of tenderness throughout. Back:  No spinal                

      tenderness.  No costovertebral tenderness.  Full range of motion. Skin:  Warm, dry with     

      normal turgor.  Normal color with no rashes, no lesions, and no evidence of cellulitis.     

      MS/ Extremity:  Pulses equal, no cyanosis.  Neurovascular intact.  Full, normal range       

      of motion. Neuro:  Awake and alert, GCS 15, oriented to person, place, time, and            

      situation.  Cranial nerves II-XII grossly intact.  Motor strength 5/5 in all                

      extremities.  Sensory grossly intact.  Cerebellar exam normal.  Normal gait. Psych:         

      Awake, alert, with orientation to person, place and time.  Behavior, mood, and affect       

      are within normal limits.                                                                   

22:30 Cardiovascular: Rate: tachycardic, Rhythm: regular, Pulses: no pulse deficits are           

      appreciated, Heart sounds: normal, Edema: is not appreciated.                               

22:30 Respiratory: mild respiratory distress is noted,  Respirations: shallow respirations,       

      tachypnea, Breath sounds: are clear throughout.                                             

                                                                                                  

Vital Signs:                                                                                      

21:09  / 84; Pulse 108; Resp 22; Temp 98.3(O); Pulse Ox 98% on R/A; Weight 113.4 kg     ll3 

      (R); Height 5 ft. 3 in. (160.02 cm); Pain 2/10;                                             

22:00  / 82; Pulse 83; Resp 16; Pulse Ox 100% on R/A;                                   jb4 

23:15  / 76; Pulse 82; Resp 16; Pulse Ox 100% on R/A;                                   jb4 

21:09 Body Mass Index 44.29 (113.40 kg, 160.02 cm)                                            ll3 

                                                                                                  

MDM:                                                                                              

21:25 Patient medically screened.                                                             snw 

22:27 Differential Diagnosis: Bronchitis Upper Respiratory Infection Asthma Exacerbation      snw 

      Pneumonia Other PE, GERD. Data reviewed: vital signs, nurses notes, lab test result(s).     

      Test considered but Not performed: Other Details Pt has a known clotting disorder and       

      is supposed to start Low dose heparin with any new pregnancies. Pt is newly pregnant,       

      has not started heparin and is tachycardic, tachypneic, with chest pain on                  

      inspiration/activity, SOB. Discussed CT with contrast for PE with pt in regards to          

      pregnancy risks. Pt consents to CT.. Care significantly affected by the following           

      chronic conditions: Hypertension, Obesity, Clotting disorder. Counseling: I had a           

      detailed discussion with the patient and/or guardian regarding: the historical points,      

      exam findings, and any diagnostic results supporting the discharge/admit diagnosis, the     

      presence of at least one elevated blood pressure reading (>120/80) during this              

      emergency department visit, lab results.                                                    

                                                                                                  

                                                                                             

21:07 Order name: Basic Metabolic Panel; Complete Time: 22:20                                 ms3 

                                                                                             

21:07 Order name: CBC with Diff; Complete Time: 21:59                                         ms3 

                                                                                             

21:07 Order name: Troponin HS; Complete Time: 22:20                                           ms3 

                                                                                             

21:09 Order name: D-Dimer; Complete Time: 22:23                                               ms3 

                                                                                             

21:25 Order name: Abo/rh Typing; Complete Time: 22:20                                         snw 

                                                                                             

21:07 Order name: EKG; Complete Time: 21:09                                                   ms3 

                                                                                             

21:25 Order name: COVID-19/FLU A+B/RSV; Complete Time: 22:23                                  snw 

                                                                                             

21:44 Order name: HCG, Quantitative; Complete Time: 22:20                                     EDMS

                                                                                             

22:30 Order name: CT Chest For PE Angio                                                       snw 

                                                                                             

21:07 Order name: EKG - Nurse/Tech; Complete Time: 21:41                                      ms3 

                                                                                             

21:07 Order name: IV Saline Lock; Complete Time: 21:41                                        ms3 

                                                                                             

21:07 Order name: Labs collected and sent; Complete Time: 21:41                               ms3 

                                                                                             

21:07 Order name: O2 Per Protocol; Complete Time: 21:23                                       ms3 

                                                                                             

21:07 Order name: O2 Sat Monitoring; Complete Time: 21:23                                     ms3 

                                                                                             

21:08 Order name: Cardiac monitoring; Complete Time: 21:41                                    ms3 

                                                                                                  

EC:30 Rate is 96 beats/min. Rhythm is regular. [place] interval is normal. QRS interval is normal. snw 

      QT interval is normal. Clinical impression: NSR w/ Non-specific ST/T Changes.               

                                                                                                  

Administered Medications:                                                                         

23:26 Not Given (Physician Discretion): NS 0.9% 1000 ml IV at 75 ml/hr continuous             jb4 

23:27 Drug: NS 0.9% 500 ml Route: IV; Rate: bolus; Site: right antecubital;                   jb4 

                                                                                                  

                                                                                                  

Disposition:                                                                                      

23:11 PA/NP's history reviewed, patient interviewed, and examined. HPI: 38-year-old female    ms3 

      with past medical history of hypertension, PCOS, clotting disorder presents for chest       

      pain and shortness of breath that has been ongoing for 2 days. Patient noted heart rate     

      to be in the 130s. My personal exam of patient reveals: Patient is alert and oriented       

      x4, in no apparent distress, nontoxic-appearing. Heart rate is tachycardic and regular,     

      lungs clear to auscultation bilaterally. Skin dry, without rashes.                          

                                                                                                  

Disposition Summary:                                                                              

23 00:25                                                                                    

Discharge Ordered                                                                                 

      Location: Home                                                                          snw 

      Condition: Stable                                                                       snw 

      Diagnosis                                                                                   

        - Dyspnea                                                                             snw 

        - Gastro-esophageal reflux disease without esophagitis                                snw 

      Followup:                                                                               snw 

        - With: Emergency Department                                                               

        - When: As needed                                                                          

        - Reason: Worsening of condition                                                           

      Followup:                                                                               snw 

        - With: Private Physician                                                                  

        - When: 2 - 3 days                                                                         

        - Reason: Recheck today's complaints, Continuance of care, Re-evaluation by your           

      physician                                                                                   

      Discharge Instructions:                                                                     

        - Discharge Summary Sheet                                                             snw 

        - Food Choices for Gastroesophageal Reflux Disease, Adult                             snw 

        - Gastroesophageal Reflux Disease, Adult                                              snw 

        - Heartburn During Pregnancy                                                          snw 

        - First Trimester of Pregnancy                                                        snw 

      Forms:                                                                                      

        - Medication Reconciliation Form                                                      snw 

        - Thank You Letter                                                                    snw 

        - Antibiotic Education                                                                snw 

        - Prescription Opioid Use                                                             snw 

      Prescriptions:                                                                              

        - Gas-X                                                                                    

            - take 240 milligram by ORAL route 2-3 times daily; 1 box; Refills: 0, Product    snw 

      Selection Permitted                                                                         

        - Pepcid 20 mg Oral Tablet                                                                 

            - take 1 tablet by ORAL route once daily; 20 tablet; Refills: 0, Product          snw 

      Selection Permitted                                                                         

Signatures:                                                                                       

Dispatcher [employer]                           EDMS                                                 

Ana Laura Montiel, FNP-C                   FNP-Csnw                                                  

Cristian Samuel, RN                       RN   jb4                                                  

Олег Wharton DO                        DO   ms3                                                  

Cathie Rosales, RN                      RN   ll3                                                  

                                                                                                  

Corrections: (The following items were deleted from the chart)                                    

21:08 21:07 Cardiac monitoring ordered. ms3                                                   ms3 

21:44 21:26 QUANTITATIVE HCG+C.LAB.BRZ ordered. EDMS                                          EDMS

22:42 21:10 Chest Single View+RAD.RAD.BRZ ordered. EDMS                                       EDMS

                                                                                                  

**************************************************************************************************

## 2023-02-14 NOTE — EKG
Test Date:    2023-02-11               Test Time:    21:25:10

Technician:   AS                                     

                                                     

MEASUREMENT RESULTS:                                       

Intervals:                                           

Rate:         96                                     

ID:           [id number]                                    

QRSD:         78                                     

QT:           344                                    

QTc:          434                                    

Axis:                                                

P:            19                                     

ID:           144                                    

QRS:          5                                      

T:            29                                     

                                                     

INTERPRETIVE STATEMENTS:                                       

                                                     

Normal sinus rhythm

Cannot rule out Anterior infarct, age undetermined

Abnormal ECG

Compared to ECG 07/28/2020 18:55:51

Myocardial infarct finding now present



Electronically Signed On 02-14-23 17:14:23 CST by Eddie Hassan

## 2023-06-04 ENCOUNTER — HOSPITAL ENCOUNTER (EMERGENCY)
Dept: HOSPITAL 97 - ER | Age: 38
Discharge: HOME | End: 2023-06-04
Payer: COMMERCIAL

## 2023-06-04 VITALS — DIASTOLIC BLOOD PRESSURE: 71 MMHG | SYSTOLIC BLOOD PRESSURE: 108 MMHG

## 2023-06-04 VITALS — OXYGEN SATURATION: 96 %

## 2023-06-04 VITALS — TEMPERATURE: 98.2 F

## 2023-06-04 DIAGNOSIS — Z3A.21: ICD-10-CM

## 2023-06-04 DIAGNOSIS — K21.9: ICD-10-CM

## 2023-06-04 DIAGNOSIS — Z88.3: ICD-10-CM

## 2023-06-04 DIAGNOSIS — Z91.048: ICD-10-CM

## 2023-06-04 DIAGNOSIS — O99.612: Primary | ICD-10-CM

## 2023-06-04 DIAGNOSIS — F17.210: ICD-10-CM

## 2023-06-04 DIAGNOSIS — Z91.018: ICD-10-CM

## 2023-06-04 DIAGNOSIS — O99.332: ICD-10-CM

## 2023-06-04 DIAGNOSIS — Z91.040: ICD-10-CM

## 2023-06-04 LAB
BUN BLD-MCNC: 7 MG/DL (ref 7–18)
COHGB MFR BLDA: 4 % (ref 0–1.5)
GLUCOSE SERPLBLD-MCNC: 106 MG/DL (ref 74–106)
HCT VFR BLD CALC: 33.1 % (ref 36–45)
LYMPHOCYTES # SPEC AUTO: 1.4 K/UL (ref 0.7–4.9)
MCV RBC: 87.3 FL (ref 80–100)
OXYHGB MFR BLDA: 90.4 % (ref 94–97)
PMV BLD: 8 FL (ref 7.6–11.3)
POTASSIUM SERPL-SCNC: 3.6 MEQ/L (ref 3.5–5.1)
RBC # BLD: 3.8 M/UL (ref 3.86–4.86)
SAO2 % BLDA: 95.7 % (ref 92–98.5)
TROPONIN I SERPL HS-MCNC: 4.8 PG/ML (ref ?–58.9)

## 2023-06-04 PROCEDURE — 80048 BASIC METABOLIC PNL TOTAL CA: CPT

## 2023-06-04 PROCEDURE — 99285 EMERGENCY DEPT VISIT HI MDM: CPT

## 2023-06-04 PROCEDURE — 36415 COLL VENOUS BLD VENIPUNCTURE: CPT

## 2023-06-04 PROCEDURE — 82805 BLOOD GASES W/O2 SATURATION: CPT

## 2023-06-04 PROCEDURE — 96375 TX/PRO/DX INJ NEW DRUG ADDON: CPT

## 2023-06-04 PROCEDURE — 96374 THER/PROPH/DIAG INJ IV PUSH: CPT

## 2023-06-04 PROCEDURE — 82728 ASSAY OF FERRITIN: CPT

## 2023-06-04 PROCEDURE — 84484 ASSAY OF TROPONIN QUANT: CPT

## 2023-06-04 PROCEDURE — 71045 X-RAY EXAM CHEST 1 VIEW: CPT

## 2023-06-04 PROCEDURE — 93005 ELECTROCARDIOGRAM TRACING: CPT

## 2023-06-04 PROCEDURE — 85025 COMPLETE CBC W/AUTO DIFF WBC: CPT

## 2023-06-04 NOTE — ER
Nurse's Notes                                                                                     

 [facility]                                                                 

Name: Pia Hawkins                                                                             

Age: 38 yrs                                                                                       

Sex: Female                                                                                       

: 1985                                                                                   

MRN: [id number]                                                                                   

Arrival Date: 2023                                                                          

Time: 18:41                                                                                       

Account#: [id number]                                                                            

Bed 2                                                                                             

Private MD:                                                                                       

Diagnosis: Chest pain, unspecified;Gastro-esophageal reflux disease without esophagitis           

                                                                                                  

Presentation:                                                                                     

                                                                                             

18:56 Chief complaint: Patient states: CP that began today at 1800, stated midsternum that    vg1 

      radiates to back and Left arm. Pt is also 21 weeks pregnant, denies any ABD                 

      pain/cramping or spotting. Coronavirus screen: Vaccine status: Patient reports              

      receiving the 2nd dose of the covid vaccine. Client denies travel out of the [place] in        

      the last 14 days. Ebola Screen: Patient negative for fever greater than or equal to         

      101.5 degrees Fahrenheit, and additional compatible Ebola Virus Disease symptoms            

      Patient denies exposure to infectious person. Patient denies travel to an                   

      Ebola-affected area in the 21 days before illness onset. Initial Sepsis Screen: Does        

      the patient meet any 2 criteria? HR > 90 bpm. Does the patient have a suspected source      

      of infection? No. Patient's initial sepsis screen is negative. Risk Assessment: Do you      

      want to hurt yourself or someone else? Patient reports no desire to harm self or            

      others. Onset of symptoms was 2023.                                                

18:56 Method Of Arrival: Ambulatory                                                           vg1 

18:56 Acuity: QUINTIN 2                                                                           vg1 

                                                                                                  

Triage Assessment:                                                                                

18:57 General: Appears uncomfortable, Behavior is crying. Pain: Complains of pain in chest    vg1 

      Pain radiates to back and left arm Pain currently is 3 out of 10 on a pain scale. at        

      worst was 8 out of 10 on a pain scale. Pain began 1 hour ago. Cardiovascular: Patient's     

      skin is warm and dry.                                                                       

                                                                                                  

OB/GYN:                                                                                           

18:57 LMP N/A - Pregnant                                                                      vg1 

                                                                                                  

Historical:                                                                                       

- Allergies:                                                                                      

18:57 Cinnamon;                                                                               vg1 

18:57 Clindamycin;                                                                            vg1 

18:57 Latex, Natural Rubber;                                                                  vg1 

- PMHx:                                                                                           

18:57 clotting disorder; Hypertension; PCOS;                                                  vg1 

                                                                                                  

- Immunization history:: Client reports receiving the 2nd dose of the Covid vaccine.              

- Social history:: Smoking status: Patient reports the use of cigarette tobacco                   

  products, 4/day.                                                                                

                                                                                                  

                                                                                                  

Screenin:57 [facility] ED Fall Risk Assessment (Adult) History of falling in the last 3 months,       ld1 

      including since admission No falls in past 3 months (0 pts). Abuse screen: Denies           

      threats or abuse. Denies injuries from another. Nutritional screening: No deficits          

      noted. Tuberculosis screening: No symptoms or risk factors identified.                      

                                                                                                  

Assessment:                                                                                       

18:57 General: Appears in no apparent distress. uncomfortable, Behavior is cooperative,       ld1 

      appropriate for age, anxious. Pain: Complains of pain in chest Pain currently is 2 out      

      of 10 on a pain scale. at worst was 8 out of 10 on a pain scale. Quality of pain is         

      described as heavy, pressure, throbbing, Pain began suddenly, Is continuous. Neuro:         

      Level of Consciousness is awake, alert, obeys commands, Oriented to person, place,          

      time, situation. Cardiovascular: Capillary refill < 3 seconds Patient's skin is warm        

      and dry. . Rhythm is sinus tachycardia. Respiratory: Airway is patent Respiratory           

      effort is even, unlabored. GI: Abdomen is non-distended, pregnant. : No signs and/or      

      symptoms were reported regarding the genitourinary system. EENT: No signs and/or            

      symptoms were reported regarding the EENT system. Derm: No signs and/or symptoms            

      reported regarding the dermatologic system. Musculoskeletal: No signs and/or symptoms       

      reported regarding the musculoskeletal system.                                              

20:08 Reassessment: Patient appears in no apparent distress at this time. pt resting with     as6 

      eyes closed.                                                                                

                                                                                                  

Vital Signs:                                                                                      

18:56  / 82; Pulse 95; Resp 18; Temp 98.2(TE); Pulse Ox 98% on R/A; Weight 115.67 kg;   vg1 

      Height 5 ft. 3 in. ; Pain 3/10;                                                             

18:57  / 85; Pulse 103; Resp 16; Pulse Ox 96% on R/A; Pain 2/10;                        ld1 

19:13  / 87; Pulse 89; Resp 13 S; Pulse Ox 97% on R/A;                                  as6 

20:08  / 79; Pulse 90; Resp 21 S; Pulse Ox 94% on R/A;                                  as6 

20:56 BP 99 / 61; Pulse 93; Resp 18; Pulse Ox 98% ;                                           rv  

22:04  / 44; Pulse 91; Resp 19; Pulse Ox 96% on R/A;                                    rv  

22:49  / 71; Pulse 86; Resp 19 S; Pulse Ox 96% on R/A;                                  as6 

18:56 Body Mass Index 45.17 (115.67 kg, 160.02 cm)                                            vg1 

18:56 Pain Scale: Adult                                                                       vg1 

18:57 Pain Scale: Adult                                                                       ld1 

                                                                                                  

[place] Coma Score:                                                                               

20:56 Eye Response: spontaneous(4). Motor Response: obeys commands(6). Verbal Response:       rv  

      oriented(5). Total: 15.                                                                     

                                                                                                  

ED Course:                                                                                        

18:42 Patient arrived in ED.                                                                  rg4 

18:57 Triage completed.                                                                       vg1 

18:57 Patient has correct armband on for positive identification. Placed in gown. Bed in low  ld1 

      position. Call light in reach. Side rails up X2. Cardiac monitor on. Pulse ox on. NIBP      

      on. Door closed. Noise minimized. Warm blanket given.                                       

18:57 Arm band placed on. EKG completed in triage. Results shown to MD.                       vg1 

18:57 No provider procedures requiring assistance completed. Patient maintains SpO2           ld1 

      saturation greater than 95% on room air.                                                    

19:02 [facility] Montiel [facility]C [facility]                                                          snw 

19:02 Evan Tovar MD is Attending Physician.                                              snw 

19:05 Francisco Javier Holt, RN is Primary Nurse.                                                    as6 

19:13 Inserted saline lock: 20 gauge in left antecubital area, using aseptic technique. Blood as6 

      collected.                                                                                  

19:41 XRAY Chest (1 view) In Process Unspecified.                                             EDMS

23:07 IV discontinued, intact, bleeding controlled, No redness/swelling at site. Pressure     as6 

      dressing applied.                                                                           

                                                                                                  

Administered Medications:                                                                         

19:26 Drug: fentaNYL (PF) IVP 25 mcg Route: IVP; Site: left antecubital;                      as6 

20:09 Follow up: Response: No adverse reaction                                                as6 

19:26 Drug: Pantoprazole IVP 40 mg Route: IVP; Site: left antecubital;                        as6 

20:09 Follow up: Response: No adverse reaction                                                as6 

20:18 Drug: GI Cocktail without Donnatal - (Maalox PO Suspension 30 ml, Lidocaine Mucous      rv  

      Membrane Liquid 2 % 15 ml) Route: PO;                                                       

22:04 Follow up: Response: No adverse reaction                                                rv  

                                                                                                  

                                                                                                  

Medication:                                                                                       

19:13 VIS not applicable for this client.                                                     as6 

                                                                                                  

Outcome:                                                                                          

22:47 Discharge ordered by MD.                                                                snw 

23:06 Discharged to home ambulatory.                                                          as6 

23:06 Condition: stable                                                                           

23:06 Discharge instructions given to patient, Instructed on discharge instructions, follow       

      up and referral plans. medication usage, Demonstrated understanding of instructions,        

      follow-up care, medications, Prescriptions given X 1.                                       

23:07 Patient left the ED.                                                                    as6 

                                                                                                  

Signatures:                                                                                       

Dispatcher [employer]                           EDMS                                                 

Ana Laura Montiel, FNDONNA-C                   FNP-Mine Bustos                                 rg4                                                  

Juan Fortune, RN                    RN   Tessa Delgado, RN                    RN   vg1                                                  

Noemy Wharton, RN                        RN   ld1                                                  

Francisco Javier Holt, ROSEANN                      RN   as6                                                  

                                                                                                  

**************************************************************************************************

## 2023-06-04 NOTE — EDPHYS
Physician Documentation                                                                           

 [facility]                                                                 

Name: Pia Hawkins                                                                             

Age: 38 yrs                                                                                       

Sex: Female                                                                                       

: 1985                                                                                   

MRN: [id number]                                                                                   

Arrival Date: 2023                                                                          

Time: 18:41                                                                                       

Account#: [id number]                                                                            

Bed 2                                                                                             

Private MD:                                                                                       

ED Physician Evan Tovar                                                                       

HPI:                                                                                              

                                                                                             

22:00 This 38 yrs old Female presents to ER via Ambulatory with complaints of Chest Pain, 21  snw 

      Weeks Pregnant.                                                                             

22:00 The patient or guardian reports chest pain that is located primarily in the anterior    snw 

      chest wall, left. The pain radiates to the left arm, Associated signs and symptoms:         

      Pertinent positives: shortness of breath. The chest pain is described as sharp.             

      Duration: The patient or guardian reports multiple episodes, that wax and wane.             

      Severity of pain: At its worst the pain was moderate severe. It is unknown whether or       

      not the patient has had similar symptoms in the past. It is unknown whether or not the      

      patient has recently seen a physician.                                                      

                                                                                                  

OB/GYN:                                                                                           

18:57 LMP N/A - Pregnant                                                                      vg1 

                                                                                                  

Historical:                                                                                       

- Allergies:                                                                                      

18:57 Cinnamon;                                                                               vg1 

18:57 Clindamycin;                                                                            vg1 

18:57 Latex, Natural Rubber;                                                                  vg1 

- PMHx:                                                                                           

18:57 clotting disorder; Hypertension; PCOS;                                                  vg1 

                                                                                                  

- Immunization history:: Client reports receiving the 2nd dose of the Covid vaccine.              

- Social history:: Smoking status: Patient reports the use of cigarette tobacco                   

  products, 4/day.                                                                                

                                                                                                  

                                                                                                  

ROS:                                                                                              

22:00 Constitutional: Negative for fever, chills, and weight loss, Eyes: Negative for injury, snw 

      pain, redness, and discharge, ENT: Negative for injury, pain, and discharge, Neck:          

      Negative for injury, pain, and swelling, Abdomen/GI: Negative for abdominal pain,           

      nausea, vomiting, diarrhea, and constipation, Back: Negative for injury and pain, :       

      Negative for injury, bleeding, discharge, and swelling, MS/Extremity: Negative for          

      injury and deformity, Skin: Negative for injury, rash, and discoloration, Neuro:            

      Negative for headache, weakness, numbness, tingling, and seizure, Psych: Negative for       

      depression, anxiety, suicide ideation, homicidal ideation, and hallucinations.              

22:00 Cardiovascular: Positive for chest pain, palpitations.                                      

22:00 Respiratory: Positive for shortness of breath.                                              

                                                                                                  

Exam:                                                                                             

21:19 Special observations: pt sleeping with vitals signs stable.                             snw 

22:02 Constitutional:  This is a well developed, well nourished patient who is awake, alert,  snw 

      and in no acute distress. Head/Face:  Normocephalic, atraumatic. Eyes:  Pupils equal        

      round and reactive to light, extra-ocular motions intact.  Lids and lashes normal.          

      Conjunctiva and sclera are non-icteric and not injected.  Cornea within normal limits.      

      Periorbital areas with no swelling, redness, or edema. ENT:  Nares patent. No nasal         

      discharge, no septal abnormalities noted.  Tympanic membranes are normal and external       

      auditory canals are clear.  Oropharynx with no redness, swelling, or masses, exudates,      

      or evidence of obstruction, uvula midline.  Mucous membranes moist. Neck:  Trachea          

      midline, no thyromegaly or masses palpated, and no cervical lymphadenopathy.  Supple,       

      full range of motion without nuchal rigidity, or vertebral point tenderness.  No            

      Meningismus. Chest/axilla:  Normal chest wall appearance and motion.  Nontender with no     

      deformity.  No lesions are appreciated.                                                     

22:02 Back:  No spinal tenderness.  No costovertebral tenderness.  Full range of motion.          

      Skin:  Warm, dry with normal turgor.  Normal color with no rashes, no lesions, and no       

      evidence of cellulitis. MS/ Extremity:  Pulses equal, no cyanosis.  Neurovascular           

      intact.  Full, normal range of motion. Neuro:  Awake and alert, GCS 15, oriented to         

      person, place, time, and situation.  Cranial nerves II-XII grossly intact.  Motor           

      strength 5/5 in all extremities.  Sensory grossly intact.  Cerebellar exam normal.          

      Normal gait. Psych:  Awake, alert, with orientation to person, place and time.              

      Behavior, mood, and affect are within normal limits.                                        

22:02 Cardiovascular: Rate: tachycardic, Rhythm: irregular, Pulses: no pulse deficits are         

      appreciated, Heart sounds: normal, Edema: is not appreciated.                               

22:02 Respiratory: the patient does not display signs of respiratory distress,  Respirations:     

      normal, Breath sounds: are clear throughout.                                                

22:02 Abdomen/GI: Inspection: gravid appearance, is noted, Bowel sounds: normal, Palpation:       

      abdomen is soft and non-tender.                                                             

                                                                                                  

Vital Signs:                                                                                      

18:56  / 82; Pulse 95; Resp 18; Temp 98.2(TE); Pulse Ox 98% on R/A; Weight 115.67 kg;   vg1 

      Height 5 ft. 3 in. ; Pain 3/10;                                                             

18:57  / 85; Pulse 103; Resp 16; Pulse Ox 96% on R/A; Pain 2/10;                        ld1 

19:13  / 87; Pulse 89; Resp 13 S; Pulse Ox 97% on R/A;                                  as6 

20:08  / 79; Pulse 90; Resp 21 S; Pulse Ox 94% on R/A;                                  as6 

20:56 BP 99 / 61; Pulse 93; Resp 18; Pulse Ox 98% ;                                           rv  

22:04  / 44; Pulse 91; Resp 19; Pulse Ox 96% on R/A;                                    rv  

22:49  / 71; Pulse 86; Resp 19 S; Pulse Ox 96% on R/A;                                  as6 

18:56 Body Mass Index 45.17 (115.67 kg, 160.02 cm)                                            vg1 

18:56 Pain Scale: Adult                                                                       vg1 

18:57 Pain Scale: Adult                                                                       ld1 

                                                                                                  

Mu Coma Score:                                                                               

20:56 Eye Response: spontaneous(4). Motor Response: obeys commands(6). Verbal Response:       rv  

      oriented(5). Total: 15.                                                                     

                                                                                                  

MDM:                                                                                              

19:04 Patient medically screened.                                                             snw 

22:48 Differential diagnosis: abnormal EKG, acute myocardial infarction, acute pericarditis,  snw 

      anxiety, coronary artery disease gastritis, gastroesophageal reflux disease (GERD),         

      pancreatitis, stable angina. Data reviewed: vital signs, nurses notes, lab test             

      result(s), EKG, radiologic studies. I considered the following discharge prescriptions      

      or medication management in the emergency department Medications were administered in       

      the Emergency Department. See MAR. Counseling: I had a detailed discussion with the         

      patient and/or guardian regarding: the historical points, exam findings, and any            

      diagnostic results supporting the discharge/admit diagnosis, lab results, radiology         

      results, the need for outpatient follow up, for definitive care, to return to the           

      emergency department if symptoms worsen or persist or if there are any questions or         

      concerns that arise at home. Response to treatment: the patient's symptoms have             

      markedly improved after treatment. Special discussion: Based on the patient's history,      

      exam, and Dx evaluation, there is no indication for emergent intervention or inpatient      

      Tx. It is understood by the patient/guardian that if the Sx's persist or worsen they        

      need to return immediately for re-evaluation. Based on the history and exam findings,       

      there is no indication for further emergent testing or inpatient evaluation. I              

      discussed with the patient/guardian the need to see the primary care provider for           

      further evaluation of the symptoms.                                                         

                                                                                                  

                                                                                             

18:57 Order name: Basic Metabolic Panel; Complete Time: 19:47                                 ld1 

                                                                                             

18:57 Order name: CBC with Diff; Complete Time: 19:32                                         ld1 

                                                                                             

18:57 Order name: Troponin HS; Complete Time: 19:47                                           ld1 

                                                                                             

19:05 Order name: ABG; Complete Time: 19:28                                                   snw 

                                                                                             

19:28 Order name: Ferritin; Complete Time: 20:10                                              snw 

                                                                                             

18:57 Order name: XRAY Chest (1 view); Complete Time: 19:55                                   ld1 

                                                                                             

18:57 Order name: EKG; Complete Time: 18:58                                                   ld1 

                                                                                             

18:57 Order name: Cardiac monitoring; Complete Time: 18:57                                    ld1 

                                                                                             

18:57 Order name: EKG - Nurse/Tech; Complete Time: 18:57                                      ld                                                                                             

18:57 Order name: IV Saline Lock; Complete Time: 19:13                                        ld1 

                                                                                             

18:57 Order name: Labs collected and sent; Complete Time: 19:13                               ld                                                                                             

18:57 Order name: O2 Per Protocol; Complete Time: 18:57                                       ld                                                                                             

18:57 Order name: O2 Sat Monitoring; Complete Time: 18:57                                     ld1 

                                                                                                  

Administered Medications:                                                                         

19:26 Drug: fentaNYL (PF) IVP 25 mcg Route: IVP; Site: left antecubital;                      as6 

20:09 Follow up: Response: No adverse reaction                                                as6 

19:26 Drug: Pantoprazole IVP 40 mg Route: IVP; Site: left antecubital;                        as6 

20:09 Follow up: Response: No adverse reaction                                                as6 

20:18 Drug: GI Cocktail without Donnatal - (Maalox PO Suspension 30 ml, Lidocaine Mucous      rv  

      Membrane Liquid 2 % 15 ml) Route: PO;                                                       

22:04 Follow up: Response: No adverse reaction                                                rv  

                                                                                                  

                                                                                                  

Disposition Summary:                                                                              

23 22:47                                                                                    

Discharge Ordered                                                                                 

      Location: Home                                                                          snw 

      Condition: Stable                                                                       snw 

      Diagnosis                                                                                   

        - Chest pain, unspecified                                                             snw 

        - Gastro-esophageal reflux disease without esophagitis                                snw 

      Followup:                                                                               snw 

        - With: Emergency Department                                                               

        - When: As needed                                                                          

        - Reason: Worsening of condition                                                           

      Followup:                                                                               snw 

        - With: Private Physician                                                                  

        - When: 1 - 2 days                                                                         

        - Reason: Recheck today's complaints, Continuance of care, Re-evaluation by your           

      physician                                                                                   

      Discharge Instructions:                                                                     

        - Discharge Summary Sheet                                                             snw 

        - Nonspecific Chest Pain, Adult                                                       snw 

        - Food Choices for Gastroesophageal Reflux Disease, Adult                             snw 

        - Indigestion                                                                         snw 

        - Heartburn During Pregnancy                                                          snw 

        - Steps to Quit Smoking                                                               snw 

        - Health Risks of Smoking                                                             snw 

        - Pregnancy and Smoking                                                               snw 

        - Breastfeeding and Smoking                                                           snw 

      Forms:                                                                                      

        - Work release form                                                                   snw 

        - Medication Reconciliation Form                                                      snw 

        - Thank You Letter                                                                    snw 

        - Antibiotic Education                                                                snw 

        - Prescription Opioid Use                                                             snw 

      Prescriptions:                                                                              

        - Pepcid 20 mg Oral Tablet                                                                 

            - take 1 tablet by ORAL route once daily; 20 tablet; Refills: 0, Product          snw 

      Selection Permitted                                                                         

Signatures:                                                                                       

Dispatcher [employer]                           EDMS                                                 

Ana Laura Montiel, FNP-C                   FNP-Csnw                                                  

Juan Fortune, RN                    RN   Tessa Delgado RN                    RN   vg1                                                  

Noemy Wharton RN                        RN   ld1                                                  

Francisco Javier Holt, RN                      RN   as6                                                  

                                                                                                  

**************************************************************************************************

## 2023-06-04 NOTE — XMS REPORT
Continuity of Care Document

                             Created on:2023



Patient:BORIS HAWKINS

Sex:Female

:1985

External Reference #:[id number]





Demographics







                          Address                   [address] [place] [address]

 

                          Home Phone                [phone]

 

                          Work Phone                [phone]

 

                          Mobile Phone              [phone]

 

                          Email Address             [email or web address]

[email or web address]

 

                          Preferred Language        en

 

                          Marital Status            Unknown

 

                          Jain Affiliation     Unknown

 

                          Race                      Unknown

 

                          Additional Race(s)        Unavailable



                                                    White

 

                          Ethnic Group              Unknown









Author







                          Organization              [facility]

t

 

                          Address                   [address] [place] [address] [address] [place] [address]

 

                          Phone                     [phone]









Support







                Name            Relationship    Address         Phone

 

                FRAME KRIS ZAMORA Emergency Provider [address] [facility] [phone]



                                                [place] [address] 

 

                FRITZ BAIRD Primary Care Physician [facility]

ES [phone]



                                                [address] 



                                                [place] [address] 

 

                TULIO HAWKINS Next of Kin     [address] [facility]   [phone]



                                                [place] [address] 

 

                MD JERRY SALAZAR Primary Care Physician [address] [place] [address] [phone]



                                                [place] [address] 

 

                MD DELMER [facility] A Emergency Provider [facility] [phone]

[phone]



                                                [place] [address]  

 

                JOHNSON PEDERSON Unavailable     [address]          Unavailable



                                                [place] [address] 

 

                Tulio Hawkins Spouse          [address] [facility]   [phone]



                                                [place] [address] 









Care Team Providers







                    Name                Role                Phone

 

                    LIZETH ESPINO    Primary Care Physician Unavailable

 

                    DAMIAN BELL   Attending Clinician Unavailable

 

                    SABINO_Ray            Attending Clinician Unavailable

 

                    ROSALINO_Minor              Attending Clinician Unavailable

 

                    NICKIE COLES        Attending Clinician Unavailable

 

                    Mireya Sharp Attending Clinician [phone]

 

                    Nickie Coles MD     Attending Clinician [phone]

 

                    Sheri So  Attending Clinician [phone]

 

                    AZUL TOMPKINS        Attending Clinician Unavailable

 

                    Azul Almaraz    Attending Clinician [phone]

 

                    Unknown, Attending  Attending Clinician Unavailable

 

                    UNKNOWN, ATTENDING  Attending Clinician Unavailable

 

                    MEGAN HENAO        Attending Clinician Unavailable

 

                    MANUELITO DOWELL     Attending Clinician Unavailable

 

                    Doctor Unassigned, No Name Attending Clinician Unavailable

 

                    Lizeth Montoya Attending Clinician [phone]

 

                    Skinny Chau DO Attending Clinician [phone]

 

                    Donovan Moran MD  Attending Clinician [phone]

 

                    KRIS MARTINEZ     Attending Clinician Unavailable

 

                    Jerry Salazar          Attending Clinician Unavailable

 

                    G_Pappas            Admitting Clinician Unavailable

 

                    ROSALINO_Minor              Admitting Clinician Unavailable

 

                    Damian Bell   Admitting Clinician Unavailable









Payers







           Payer Name Policy Type Policy Number Effective Date Expiration Date S

ource

 

           [place]            [id number] 2022            



           (PPO)                            00:00:00              

 

           [employer]            [id number] 2020-10-01            



           OUT OF NETWORK                       00:00:00              

 

           Stream -            [id number] 2020-10-01            



           EV BENEFITS                       00:00:00              



           MANAGEMENT                                             

 

           [employer]                 [id number] 2016            



                                            00:00:00              







Problems







       Condition Condition Condition Status Onset  Resolution Last   Treating Co

mments 

Source



       Name   Details Category        Date   Date   Treatment Clinician        



                                                 Date                 

 

       Gestationa Gestationa Problem Active                              M

[facility]



       l diabetes l Diabetes               6-01                               da



       mellitus Mellitus               00:00:                             Medica

l



                                   00                                 Group

 

       RhD    RhD    Problem Active                              [facility]



       negative Negative               5-03                               da



                                   00:00:                             Medical



                                   00                                 Group

 

       Protein C Protein C Problem Active                              Mat

agor



       deficiency Deficiency               4-12                               da



       disease Disease               00:00:                             Medical



                                   00                                 Group

 

       Pre-existi Pre-existi Problem Active                              M

[facility]



       ng type 2 ng Type 2               2-27                               da



       diabetes Diabetes               00:00:                             Medica

l



       mellitus Mellitus               00                                 Group



       in     in                                                      



       pregnancy Pregnancy                                                  

 

       Hypoplasmi Hypoplasmi Problem Active                              M

[facility]



       nogenemia nogenemia               2-27                               da



                                   00:00:                             Medical



                                   00                                 Group

 

       Depressive Depressive Problem Active                              M

[facility]



       disorder Disorder               2-27                               da



                                   00:00:                             Medical



                                   00                                 Group

 

       Benign Benign Problem Active                              [facility]



       essential Essential               2-27                               da



       hypertensi Hypertensi               00:00:                             Me

dical



       on                                      Group



       complicati Complicati                                                  



       ng     ng                                                      



       pregnancy, Pregnancy,                                                  



       childbirth Childbirth                                                  



       and the and the                                                  



       puerperium Puerperium                                                  



       - not  - Not                                                   



       delivered Delivered                                                  

 

       Multigravi Multigravi Problem Active                              M

[facility]



       da of  da of                2-27                               da



       advanced Advanced               00:00:                             Medica

l



       maternal Maternal               00                                 Group



       age    Age                                                     

 

       Pregnancy Pregnancy Problem Active                              Mat

agor



                                   2-27                               da



                                   00:00:                             Medical



                                   00                                 Group

 

       History of History of Problem Active                              M

[facility]



       pre-eclamp Pre-eclamp               2-27                               da



       adrian    adrian                  00:00:                             Medical



                                   00                                 Group

 

       Maternal Maternal Problem Active                              [facility]

or



       history of History of               2-27                               da



       gestationa Gestationa               00:00:                             Me

dical



       l diabetes l Diabetes               00                                 Gr

oup

 

       High risk High Risk Problem Active                              Mat

agor



       pregnancy Pregnancy               2-10                               da



       due to Due to               00:00:                             Medical



       recurrent Recurrent               00                                 Grou

p



       pregnancy Pregnancy                                                  



       loss   Loss                                                    

 

       Body mass Body Mass Problem Active                              Mat

agor



       index 40+ Index 40+               4-18                               da



       - severely - Severely               00:00:                             Me

dical



       obese  Obese                00                                 Group

 

       Benign Benign Problem Active                              [facility]



       essential Essential               9-22                               da



       hypertensi Hypertensi               00:00:                             Me

dical



       on     on                   00                                 Group

 

       Nicotine Nicotine Problem Active                              [facility]

or



       dependence Dependence               3-31                               da



                                   00:00:                             Medical



                                   00                                 Group

 

       Polycystic Polycystic Problem Active                              M

[facility]



       ovaries Ovaries               2-10                               da



                                   00:00:                             Medical



                                   00                                 Group

 

       Blood  Blood  Problem Active                              [facility]



       coagulatio Coagulatio               726                               da



       n disorder n Disorder               00:00:                             Me

dical



                                   00                                 Group

 

       Anxiety Anxiety Problem Active                              [facility]



                                   7                               da



                                   00:00:                             Medical



                                                                    Group

 

       Essential Essential Problem Active                              Mat

agor



       hypertensi Hypertensi               7-                               da



       on     on                   00:00:                             Medical



                                   00                                 Group

 

       Obesity Obesity Problem Active                                    [facility]



                                                                      da



                                                                      Medical



                                                                      Group







Allergies, Adverse Reactions, Alerts







       Allergy Allergy Status Severity Reaction(s) Onset  Inactive Treating Comm

ents 

Source



       Name   Type                        Date   Date   Clinician        

 

       Clindamy Allergy Active                                     [facility]



       cordell    to                          7                        da



              substanc                      00:00:                      Medical



              e                           00                          Group

 

       Erythrom Allergy Active                                     [facility]



       ycin   to                          7                        da



       Base   substanc                      00:00:                      Medical



              e                           00                          Group

 

       Latex  Allergy Active        Rash                         [facility]



              to                          7                        da



              substanc                      00:00:                      Medical



              e                           00                          Group

 

       CLINDAMY DRUG   Active        Unknown-Cmnt                       Un

suhail



       CORDELL    [facility]REDI                                              ity of



                                          00:00:                      [place]



                                          00                          Medical



                                                                      Branch

 

       ERYTHROM DRUG   Active        Unknown-Cmnt                       Un

suhail



       YCIN                                                       ity of



                                          00:00:                      [place]



                                          00                          Medical



                                                                      Branch

 

       LATEX  DRUG   Active        Rash                         Univers



              INGREDI                                              ity of



                                          00:00:                      [place]



                                          00                          Medical



                                                                      Branch

 

       Clindamy Propensi Active        Unknown -                       Uni

vers



       cordell    ty to                See comments                         ity 

of



              adverse                      00:00:                      [place]



              reaction                      00                          Medical



              s                                                       Branch

 

       Erythrom Propensi Active        Unknown -                       Uni

vers



       ycin   ty to                See comments                         ity 

of



              adverse                      00:00:                      [place]



              reaction                                                Medical



              s                                                       Branch

 

       Latex  Propensi Active        Rash                         Univers



              ty to                                               ity of



              adverse                      00:00:                      [place]



              reaction                      00                          Medical



              s                                                       Branch







Social History







           Social Habit Start Date Stop Date  Quantity   Comments   Source

 

           History [facility] o

f



           Alcohol Binge                                             [place] [facility]

al



                                                                  Branch

 

           Exposure to                       Not sure              [facility]



           SARS-CoV-2 (event)                                             [facility]

 

           History of tobacco                       Cigarette Smoker            

[facility]



           use                                                    [facility]



                                                                  Branch

 

           History [facility] o

f



           [facility]                                             [facility]



                                                                  [facility]

 

           History [facility] o

f



           Alcohol Std Drinks                                             [facility]

 

           Alcohol intake 2021 Current drinker            Unive

rsity of



                      00:00:00   00:00:00   of alcohol            [facility]



                                            (finding)             Branch

 

           Alcohol Comment 2016 occasional            [facility]

y of



                      00:00:00   00:00:00                         [facility]

 

           Tobacco use and 2016 Never used            Universit

y of



           exposure   00:00:00   00:00:00                         [facility]

 

           Cigarettes smoked 2016                       Univers

ity of



           current (pack per 00:00:00   00:00:00                         [facility]



           day) - Reported                                             Branch

 

           Cigarette  2016                       [facility]



           pack-years 00:00:00   00:00:00                         [facility]

 

           Sex Assigned At 1985                       Universit

y of



           Birth      00:00:00   00:00:00                         [facility]









                Smoking Status  Start Date      Stop Date       Source

 

                Light Tobacco Smoker                                 [facility]

[facility]

 

                Current every day smoker 2016 00:00:00                 Uni

versity of [facility]







Medications







       Ordered Filled Start  Stop   Current Ordering Indication Dosage Frequency

 Signature

                    Comments            Components          Source



     Medication Medication Date Date Medication? Clinician                (SIG) 

          



     Name Name                                                   

 

     methylPREDN      2021- No        [id number] 80mg                     

Univers



     ISolone                                               ity of



     acetate      03:30: 02:26                                         [place]



     (DEPO-MEDRO      00   :00                                          Medical



     L)                                                          Branch



     injection                                                        



     80 mg                                                        

 

     methylPREDN      2021- No        [id number] 80mg      80 mg,         

  Univers



     ISolone                                Intramuscu           ity o

f



     acetate      03:30: 02:26                          lar, ONCE,           Denis

as



     (DEPO-MEDRO      00   :00                           1 dose, On           Me

dical



     L)                                           Tue            Branch



     injection                                         21           



     80 mg                                         at 2130,           



                                                  Routine           

 

     albuterol      2021- No        [id number] 2{puff}                    

 Univers



     (VENTOLIN)                                               ity of



     inhaler 2      02:16: 02:26                                         [place]



     Puff      00   :00                                          Medical



                                                                 Branch

 

     albuterol      2021- No        [id number] 2{puff}      2 Puff,       

    Univers



     (VENTOLIN)                                Inhalation           it

y of



     inhaler 2      02:16: 02:26                          , ONCE, 1           Te

[place]



     Puff      00   :00                           dose, On           Medical



                                                  Tue            Branch



                                                  21           



                                                  at 2030,           



                                                  Routine           

 

     ampicillin      2021- No        [id number] 500mg      Take 1          

 Univers



     500 mg                                capsule by           ity of



     capsule      00:00: 05:59                          mouth           [place]



               00   :00                           every 6           Medical



                                                  (six)           Branch



                                                  hours for           



                                                  7 days.           

 

     Nitrofurant      2021- No        [id number] 100mg      Take 1         

  Univers



     oin&Nit.                                capsule by           ity 

of



     Macrocryst      00:00: 05:59                          mouth 2           [facility]

as



     (MACROBID)      00   :00                           (two)           Medical



     100 mg                                         times           Branch



     capsule                                         daily for           



                                                  7 days.           

 

     ESCITALOPRA      -      Yes       [id number]           Take 1           

Univers



     M OXALATE      8-31                               tablet by           ity o

f



     20 mg      00:00:                               mouth once           [place]



     tablet      00                                 daily           Medical



                                                                 Branch

 

     aspirin      2020      Yes            81mg      Take 81 mg           Univ

ers



     (ASPIRIN      2-19                               by mouth           ity of



     LOW DOSE)      13:43:                               daily.           [place]



     81 mg EC      13                                                Medical



     tablet                                                        Branch

 

     lisinopril-      2020-0      Yes       [id number] 1{tbl}      Take 1         

  [facility]



     hydrochloro      2-19                               tablet by           ity

 of



     thiazide      13:43:                               mouth           [place]



     20-12.5 mg      13                                 every           Medical



     per tablet                                         morning.           Branc

h

 

     buPROPion            Yes       [id number]           Start 1           U

nivers



     SR        4-16                               tab PO           ity of



     (WELLBUTRIN      00:00:                               daily x 3           T

[facility]



     SR) 150 mg      00                                 days, then           Med

ical



     SR tablet                                         increase           [facility]



                                                  to 1 tab           



                                                  PO BID.           

 

     amoxicillin amoxicillin           No                       amoxicilli      

     [facility]



     875  875                                     n 875           da



     mg-potassiu mg-potassiu                                    mg-potassi      

     [facility]



     m    m                                       um             Group



     clavulanate clavulanate                                    clavulanat      

     



     125 mg 125 mg                                    e 125 mg           



     tablet TAKE tablet TAKE                                    tablet          

 



     1 TABLET BY 1 TABLET BY                                    TAKE 1          

 



     MOUTH TWICE MOUTH TWICE                                    TABLET BY       

    



     DAILY DAILY                                    MOUTH           



                                                  TWICE           



                                                  DAILY           

 

     aspirin 81 aspirin 81           No                       aspirin 81        

   [facility]



     mg   mg                                      mg             da



     tablet,bari tablet,bari                                    tablet,del      

     [facility]



     yed release yed release                                    ayed           G

roup



     TAKE 1 TAKE 1                                    release           



     TABLET BY TABLET BY                                    TAKE 1           



     MOUTH EVERY MOUTH EVERY                                    TABLET BY       

    



     DAY  DAY                                     MOUTH           



                                                  EVERY DAY           

 

     bupropion bupropion           No                       bupropion           

[facility]



     HCl  HCl                                     HCl         

   da



     mg   mg                                      mg             Medical



     tablet,12 tablet,12                                    tablet,12           

Group



     hr   hr                                      hr             



     sustained-r sustained-r                                    sustained-      

     



     elease TAKE elease TAKE                                    release         

  



     1 TABLET BY 1 TABLET BY                                    TAKE 1          

 



     MOUTH TWICE MOUTH TWICE                                    TABLET BY       

    



     DAILY FOR DAILY FOR                                    MOUTH           



     DEPRESSION DEPRESSION                                    TWICE           



                                                  DAILY FOR           



                                                  DEPRESSION           

 

     ciprofloxac ciprofloxac           No                       ciprofloxa      

     [facility]



     in 500 mg in 500 mg                                    cordell 500 mg          

 da



     tablet TAKE tablet TAKE                                    tablet          

 Medical



     1 TABLET BY 1 TABLET BY                                    TAKE 1          

 Group



     MOUTH TWICE MOUTH TWICE                                    TABLET BY       

    



     DAILY DAILY                                    MOUTH           



                                                  TWICE           



                                                  DAILY           

 

     Cortisporin Cortisporin           No                       Cortispori      

     [facility]



     -TC 3.3 -TC 3.3                                    n-TC 3.3           da



     mg-3 mg-10 mg-3 mg-10                                    mg-3 mg-10        

   Medical



     mg-0.5 mg-0.5                                    mg-0.5           Group



     mg/mL ear mg/mL ear                                    mg/mL ear           



     drops,suspe drops,suspe                                    drops,susp      

     



     nsion nsion                                    ension           



     INSTILL 1 INSTILL 1                                    INSTILL 1           



     DROP INTO DROP INTO                                    DROP INTO           



     AFFECTED AFFECTED                                    AFFECTED           



     EAR(S) EAR(S)                                    EAR(S)           



     EVERY 4 EVERY 4                                    EVERY 4           



     HOURS HOURS                                    HOURS           

 

     escitalopra escitalopra           No                       escitalopr      

     [facility]



     m 20 mg m 20 mg                                    am 20 mg           da



     tablet TAKE tablet TAKE                                    tablet          

 Medical



     1 TABLET BY 1 TABLET BY                                    TAKE 1          

 Group



     MOUTH ONCE MOUTH ONCE                                    TABLET BY         

  



     DAILY FOR DAILY FOR                                    MOUTH ONCE          

 



     DEPRESSION DEPRESSION                                    DAILY FOR         

  



     (NEED (NEED                                    DEPRESSION           



     APPOINTMENT APPOINTMENT                                    (NEED           



     FOR FURTHER FOR FURTHER                                    APPOINTMEN      

     



     REFILLLS) REFILLLS)                                    T FOR           



                                                  FURTHER           



                                                  REFILLLS)           

 

     lisinopril lisinopril           No                       lisinopril        

   [facility]



     10   10                                      10             da



     mg-hydrochl mg-hydrochl                                    mg-hydroch      

     Medical



     orothiazide orothiazide                                    lorothiazi      

     Group



     12.5 mg 12.5 mg                                    de 12.5 mg           



     tablet TAKE tablet TAKE                                    tablet          

 



     1 TABLET BY 1 TABLET BY                                    TAKE 1          

 



     MOUTH ONCE MOUTH ONCE                                    TABLET BY         

  



     DAILY FOR DAILY FOR                                    MOUTH ONCE          

 



     BLOOD BLOOD                                    DAILY FOR           



     PRESSURE PRESSURE                                    BLOOD           



                                                  PRESSURE           

 

     metformin metformin           No             1    BID  metformin           

[facility]



     500 mg 500 mg                                    500 mg           da



     tablet Take tablet Take                                    tablet          

 Medical



     1 tablet 1 tablet                                    Take 1           Group



     twice a day twice a day                                    tablet          

 



     by oral by oral                                    twice a           



     route. route.                                    day by           



                                                  oral           



                                                  route.           

 

     methylpredn methylpredn           No                       methylpred      

     [facility]



     isolone 4 isolone 4                                    nisolone 4          

 da



     mg tablets mg tablets                                    mg tablets        

   Medical



     in a dose in a dose                                    in a dose           

Group



     pack TAKE pack TAKE                                    pack TAKE           



     AS DIRECTED AS DIRECTED                                    AS             



                                                  DIRECTED           

 

     phenazopyri phenazopyri           No                       phenazopyr      

     [facility]



     dine 200 mg dine 200 mg                                    idine 200       

    da



     tablet tablet                                    mg tablet           Medica

l



                                                                 Group

 

     Prescriptio Prescriptio           No                       Prescripti      

     [facility]



     n - Prior n - Prior                                    on - Prior          

 da



     Authorizati Authorizati                                    Authorizat      

     Medical



     on Request on Request                                    ion            [facility]

up



                                                  Request           

 

     progesteron progesteron           No             1capsul BID  progestero   

        [facility]



     e    e                             e(s)      ne             da



     micronized micronized                                    micronized        

   Medical



     200 mg 200 mg                                    200 mg           Group



     capsule capsule                                    capsule           



     Take 1 Take 1                                    Take 1           



     capsule capsule                                    capsule           



     twice a day twice a day                                    twice a         

  



     by oral by oral                                    day by           



     route. route.                                    oral           



                                                  route.           

 

     aspirin 81 aspirin 81           No                       aspirin 81        

   [facility]



     mg   mg                                      mg             da



     tablet,bari tablet,bari                                    tablet,del      

     [facility]



     yed release yed release                                    ayed           G

roup



     TAKE 1 TAKE 1                                    release           



     TABLET BY TABLET BY                                    TAKE 1           



     MOUTH EVERY MOUTH EVERY                                    TABLET BY       

    



     DAY  DAY                                     MOUTH           



                                                  EVERY DAY           

 

     ciprofloxac ciprofloxac           No                       ciprofloxa      

     [facility]



     in 500 mg in 500 mg                                    cordell 500 mg          

 da



     tablet TAKE tablet TAKE                                    tablet          

 Medical



     1 TABLET BY 1 TABLET BY                                    TAKE 1          

 Group



     MOUTH TWICE MOUTH TWICE                                    TABLET BY       

    



     DAILY DAILY                                    MOUTH           



                                                  TWICE           



                                                  DAILY           

 

     Diflucan Diflucan           No             1    Q1D  Diflucan           [facility]

[facility]



     200 mg 200 mg                                    200 mg           da



     tablet Take tablet Take                                    tablet          

 Medical



     1 tablet 1 tablet                                    Take 1           Group



     every day every day                                    tablet           



     by oral by oral                                    every day           



     route for 3 route for 3                                    by oral         

  



     days. days.                                    route for           



                                                  3 days.           

 

     enoxaparin enoxaparin           No                       enoxaparin        

   [facility]



     40 mg/0.4 40 mg/0.4                                    40 mg/0.4           

da



     mL   mL                                      mL             Medical



     subcutaneou subcutaneou                                    subcutaneo      

     Group



     s syringe s syringe                                    us syringe          

 



     INJECT 40MG INJECT 40MG                                    INJECT          

 



     SUBCUTANEOU SUBCUTANEOU                                    40MG           



     S DAILY S DAILY                                    SUBCUTANEO           



                                                  US DAILY           

 

     metformin metformin           No             1    BID  metformin           

[facility]



     500 mg 500 mg                                    500 mg           da



     tablet Take tablet Take                                    tablet          

 Medical



     1 tablet 1 tablet                                    Take 1           Group



     twice a day twice a day                                    tablet          

 



     by oral by oral                                    twice a           



     route. route.                                    day by           



                                                  oral           



                                                  route.           

 

     progesteron progesteron           No             1capsul BID  progestero   

        [facility]



     e    e                             e(s)      ne             da



     micronized micronized                                    micronized        

   Medical



     200 mg 200 mg                                    200 mg           Group



     capsule capsule                                    capsule           



     Take 1 Take 1                                    Take 1           



     capsule capsule                                    capsule           



     twice a day twice a day                                    twice a         

  



     by oral by oral                                    day by           



     route. route.                                    oral           



                                                  route.           

 

     escitalopra escitalopra           No                       escitalopr      

     [facility]



     m 20 mg m 20 mg                                    am 20 mg           da



     tablet TAKE tablet TAKE                                    tablet          

 Medical



     1 TABLET BY 1 TABLET BY                                    TAKE 1          

 Group



     MOUTH ONCE MOUTH ONCE                                    TABLET BY         

  



     DAILY DAILY                                    MOUTH ONCE           



     PATIENT PATIENT                                    DAILY           



     MUST BE MUST BE                                    PATIENT           



     SEEN FOR SEEN FOR                                    MUST BE           



     FURTHER FURTHER                                    SEEN FOR           



     REFILLS REFILLS                                    FURTHER           



                                                  REFILLS           

 

     lisinopril lisinopril           No             1    Q1D  lisinopril        

   [facility]



     10   10                                      10             da



     mg-hydrochl mg-hydrochl                                    mg-hydroch      

     Medical



     orothiazide orothiazide                                    lorothiazi      

     Group



     12.5 mg 12.5 mg                                    de 12.5 mg           



     tablet Take tablet Take                                    tablet          

 



     1 tablet 1 tablet                                    Take 1           



     every day every day                                    tablet           



     by oral by oral                                    every day           



     route. route.                                    by oral           



                                                  route.           

 

     metformin metformin           No             1    BID  metformin           

[facility]



     500 mg 500 mg                                    500 mg           da



     tablet Take tablet Take                                    tablet          

 Medical



     1 tablet 1 tablet                                    Take 1           Group



     twice a day twice a day                                    tablet          

 



     by oral by oral                                    twice a           



     route. route.                                    day by           



                                                  oral           



                                                  route.           

 

     enoxaparin enoxaparin           No                       enoxaparin        

   [facility]



     40 mg/0.4 40 mg/0.4                                    40 mg/0.4           

da



     mL   mL                                      mL             Medical



     subcutaneou subcutaneou                                    subcutaneo      

     Group



     s syringe s syringe                                    us syringe          

 



     INJECT 40MG INJECT 40MG                                    INJECT          

 



     SUBCUTANEOU SUBCUTANEOU                                    40MG           



     S DAILY S DAILY                                    SUBCUTANEO           



                                                  US DAILY           

 

     omeprazole omeprazole           No             1capsul Q1D  omeprazole     

      [facility]



     40 mg 40 mg                          e(s)      40 mg           da



     capsule,del capsule,del                                    capsule,de      

     Medical



     ayed ayed                                    layed           Group



     release release                                    release           



     Take 1 Take 1                                    Take 1           



     capsule capsule                                    capsule           



     every day every day                                    every day           



     by oral by oral                                    by oral           



     route. route.                                    route.           

 

     Wellbutrin Wellbutrin           No             1    BID  Wellbutrin        

   [facility]



      mg  mg                                     mg           

da



     tablet, 12 tablet, 12                                    tablet, 12        

   Medical



     hr   hr                                      hr             Group



     sustained-r sustained-r                                    sustained-      

     



     elease Take elease Take                                    release         

  



     1 tablet 1 tablet                                    Take 1           



     twice a day twice a day                                    tablet          

 



     by oral by oral                                    twice a           



     route. route.                                    day by           



                                                  oral           



                                                  route.           

 

     progesteron progesteron           No                       progestero      

     [facility]



     e    e                                       ne             da



     micronized micronized                                    micronized        

   Medical



     200 mg 200 mg                                    200 mg           Group



     capsule capsule                                    capsule           



     Take 1 Take 1                                    Take 1           



     capsule capsule                                    capsule           



     twice a day twice a day                                    twice a         

  



     by oral by oral                                    day by           



     route. route.                                    oral           



                                                  route.           

 

     Reglan 10 Reglan 10           No             1    QID  Reglan 10           

[facility]



     mg tablet mg tablet                                    mg tablet           

da



     Take 1 Take 1                                    Take 1           Medical



     tablet 4 tablet 4                                    tablet 4           [facility]

up



     times a day times a day                                    times a         

  



     by oral by oral                                    day by           



     route as route as                                    oral route           



     directed directed                                    as             



     for 30 for 30                                    directed           



     days. days.                                    for 30           



                                                  days.           

 

     enoxaparin enoxaparin           No                       enoxaparin        

   [facility]



     40 mg/0.4 40 mg/0.4                                    40 mg/0.4           

da



     mL   mL                                      mL             Medical



     subcutaneou subcutaneou                                    subcutaneo      

     Group



     s syringe s syringe                                    us syringe          

 



     INJECT 40MG INJECT 40MG                                    INJECT          

 



     SUBCUTANEOU SUBCUTANEOU                                    40MG           



     S DAILY S DAILY                                    SUBCUTANEO           



                                                  US DAILY           

 

     metoclopram metoclopram           No                       metoclopra      

     [facility]



     rolanda 10 mg rolanda 10 mg                                    mide 10 mg          

 da



     tablet TAKE tablet TAKE                                    tablet          

 Medical



     1 TABLET BY 1 TABLET BY                                    TAKE 1          

 Group



     MOUTH FOUR MOUTH FOUR                                    TABLET BY         

  



     TIMES DAILY TIMES DAILY                                    MOUTH FOUR      

     



     AS DIRECTED AS DIRECTED                                    TIMES           



                                                  DAILY AS           



                                                  DIRECTED           

 

     omeprazole omeprazole           No                       omeprazole        

   [facility]



     40 mg 40 mg                                    40 mg           da



     capsule,del capsule,del                                    capsule,de      

     Medical



     ayed ayed                                    layed           Group



     release release                                    release           



     TAKE 1 TAKE 1                                    TAKE 1           



     CAPSULE BY CAPSULE BY                                    CAPSULE BY        

   



     MOUTH EVERY MOUTH EVERY                                    MOUTH           



     DAY  DAY                                     EVERY DAY           

 

     progesteron progesteron           No                       progestero      

     [facility]



     e    e                                       ne             da



     micronized micronized                                    micronized        

   Medical



     200 mg 200 mg                                    200 mg           Group



     capsule capsule                                    capsule           



     TAKE 1 TAKE 1                                    TAKE 1           



     CAPSULE BY CAPSULE BY                                    CAPSULE BY        

   



     MOUTH TWICE MOUTH TWICE                                    MOUTH           



     DAILY DAILY                                    TWICE           



                                                  DAILY           

 

     bupropion bupropion           No                       bupropion           

[facility]



     HCl  HCl                                     HCl         

   da



     mg 24 hr mg 24 hr                                    mg 24 hr           Med

ical



     tablet, tablet,                                    tablet,           Group



     extended extended                                    extended           



     release release                                    release           



     TAKE 1 TAKE 1                                    TAKE 1           



     TABLET BY TABLET BY                                    TABLET BY           



     MOUTH EVERY MOUTH EVERY                                    MOUTH           



     DAY  DAY                                     EVERY DAY           

 

     enoxaparin enoxaparin           No                       enoxaparin        

   [facility]



     40 mg/0.4 40 mg/0.4                                    40 mg/0.4           

da



     mL   mL                                      mL             Medical



     subcutaneou subcutaneou                                    subcutaneo      

     Group



     s syringe s syringe                                    us syringe          

 



     INJECT 40MG INJECT 40MG                                    INJECT          

 



     SUBCUTANEOU SUBCUTANEOU                                    40MG           



     S DAILY S DAILY                                    SUBCUTANEO           



                                                  US DAILY           

 

     ferrous ferrous           No             1    Q1D  ferrous           [facility]

r



     gluconate gluconate                                    gluconate           

da



     240 mg (27 240 mg (27                                    240 mg (27        

   Medical



     mg iron) mg iron)                                    mg iron)           [facility]

up



     tablet Take tablet Take                                    tablet          

 



     1 tablet 1 tablet                                    Take 1           



     every day every day                                    tablet           



     by oral by oral                                    every day           



     route. route.                                    by oral           



                                                  route.           

 

     metoclopram metoclopram           No                       metoclopra      

     [facility]



     rolanda 10 mg rolanda 10 mg                                    mide 10 mg          

 da



     tablet TAKE tablet TAKE                                    tablet          

 Medical



     1 TABLET BY 1 TABLET BY                                    TAKE 1          

 Group



     MOUTH FOUR MOUTH FOUR                                    TABLET BY         

  



     TIMES DAILY TIMES DAILY                                    MOUTH FOUR      

     



     AS DIRECTED AS DIRECTED                                    TIMES           



                                                  DAILY AS           



                                                  DIRECTED           

 

     omeprazole omeprazole           No                       omeprazole        

   [facility]



     40 mg 40 mg                                    40 mg           da



     capsule,del capsule,del                                    capsule,de      

     Medical



     ayed ayed                                    layed           Group



     release release                                    release           



     TAKE 1 TAKE 1                                    TAKE 1           



     CAPSULE BY CAPSULE BY                                    CAPSULE BY        

   



     MOUTH EVERY MOUTH EVERY                                    MOUTH           



     DAY  DAY                                     EVERY DAY           

 

     progesteron progesteron           No                       progestero      

     [facility]



     e    e                                       ne             da



     micronized micronized                                    micronized        

   Medical



     200 mg 200 mg                                    200 mg           Group



     capsule capsule                                    capsule           



     TAKE 1 TAKE 1                                    TAKE 1           



     CAPSULE BY CAPSULE BY                                    CAPSULE BY        

   



     MOUTH TWICE MOUTH TWICE                                    MOUTH           



     DAILY DAILY                                    TWICE           



                                                  DAILY           

 

     amoxicillin amoxicillin           No                       amoxicilli      

     [facility]



     875  875                                     n 875           da



     mg-potassiu mg-potassiu                                    mg-potassi      

     Medical



     m    m                                       um             Group



     clavulanate clavulanate                                    clavulanat      

     



     125 mg 125 mg                                    e 125 mg           



     tablet TAKE tablet TAKE                                    tablet          

 



     1 TABLET BY 1 TABLET BY                                    TAKE 1          

 



     MOUTH TWICE MOUTH TWICE                                    TABLET BY       

    



     DAILY DAILY                                    MOUTH           



                                                  TWICE           



                                                  DAILY           

 

     aspirin 81 aspirin 81           No                       aspirin 81        

   [facility]



     mg   mg                                      mg             da



     tablet,bari tablet,bari                                    tablet,del      

     Medical



     yed release yed release                                    ayed           G

roup



     TAKE 1 TAKE 1                                    release           



     TABLET BY TABLET BY                                    TAKE 1           



     MOUTH EVERY MOUTH EVERY                                    TABLET BY       

    



     DAY  DAY                                     MOUTH           



                                                  EVERY DAY           

 

     bupropion bupropion           No                       bupropion           

[facility]



     HCl  HCl                                     HCl         

   da



     mg   mg                                      mg             Medical



     tablet,12 tablet,12                                    tablet,12           

Group



     hr   hr                                      hr             



     sustained-r sustained-r                                    sustained-      

     



     elease TAKE elease TAKE                                    release         

  



     1 TABLET BY 1 TABLET BY                                    TAKE 1          

 



     MOUTH TWICE MOUTH TWICE                                    TABLET BY       

    



     DAILY FOR DAILY FOR                                    MOUTH           



     DEPRESSION DEPRESSION                                    TWICE           



                                                  DAILY FOR           



                                                  DEPRESSION           

 

     ciprofloxac ciprofloxac           No                       ciprofloxa      

     [facility]



     in 500 mg in 500 mg                                    cordell 500 mg          

 da



     tablet TAKE tablet TAKE                                    tablet          

 Medical



     1 TABLET BY 1 TABLET BY                                    TAKE 1          

 Group



     MOUTH TWICE MOUTH TWICE                                    TABLET BY       

    



     DAILY DAILY                                    MOUTH           



                                                  TWICE           



                                                  DAILY           

 

     Cortisporin Cortisporin           No                       Cortispori      

     [facility]



     -TC 3.3 -TC 3.3                                    n-TC 3.3           da



     mg-3 mg-10 mg-3 mg-10                                    mg-3 mg-10        

   Medical



     mg-0.5 mg-0.5                                    mg-0.5           Group



     mg/mL ear mg/mL ear                                    mg/mL ear           



     drops,suspe drops,suspe                                    drops,susp      

     



     nsion nsion                                    ension           



     INSTILL 1 INSTILL 1                                    INSTILL 1           



     DROP INTO DROP INTO                                    DROP INTO           



     AFFECTED AFFECTED                                    AFFECTED           



     EAR(S) EAR(S)                                    EAR(S)           



     EVERY 4 EVERY 4                                    EVERY 4           



     HOURS HOURS                                    HOURS           

 

     escitalopra escitalopra           No                       escitalopr      

     [facility]



     m 20 mg m 20 mg                                    am 20 mg           da



     tablet TAKE tablet TAKE                                    tablet          

 Medical



     1 TABLET BY 1 TABLET BY                                    TAKE 1          

 Group



     MOUTH ONCE MOUTH ONCE                                    TABLET BY         

  



     DAILY FOR DAILY FOR                                    MOUTH ONCE          

 



     DEPRESSION DEPRESSION                                    DAILY FOR         

  



     (NEED (NEED                                    DEPRESSION           



     APPOINTMENT APPOINTMENT                                    (NEED           



     FOR FURTHER FOR FURTHER                                    APPOINTMEN      

     



     REFILLLS) REFILLLS)                                    T FOR           



                                                  FURTHER           



                                                  REFILLLS)           

 

     lisinopril lisinopril           No                       lisinopril        

   [facility]



     10   10                                      10             da



     mg-hydrochl mg-hydrochl                                    mg-hydroch      

     Medical



     orothiazide orothiazide                                    lorothiazi      

     Group



     12.5 mg 12.5 mg                                    de 12.5 mg           



     tablet TAKE tablet TAKE                                    tablet          

 



     1 TABLET BY 1 TABLET BY                                    TAKE 1          

 



     MOUTH ONCE MOUTH ONCE                                    TABLET BY         

  



     DAILY FOR DAILY FOR                                    MOUTH ONCE          

 



     BLOOD BLOOD                                    DAILY FOR           



     PRESSURE PRESSURE                                    BLOOD           



                                                  PRESSURE           

 

     metformin metformin           No             1    BID  metformin           

[facility]



     500 mg 500 mg                                    500 mg           da



     tablet Take tablet Take                                    tablet          

 Medical



     1 tablet 1 tablet                                    Take 1           Group



     twice a day twice a day                                    tablet          

 



     by oral by oral                                    twice a           



     route. route.                                    day by           



                                                  oral           



                                                  route.           

 

     methylpredn methylpredn           No                       methylpred      

     [facility]



     isolone 4 isolone 4                                    nisolone 4          

 da



     mg tablets mg tablets                                    mg tablets        

   Medical



     in a dose in a dose                                    in a dose           

Group



     pack TAKE pack TAKE                                    pack TAKE           



     AS DIRECTED AS DIRECTED                                    AS             



                                                  DIRECTED           

 

     phenazopyri phenazopyri           No                       phenazopyr      

     [facility]



     dine 200 mg dine 200 mg                                    idine 200       

    da



     tablet tablet                                    mg tablet           Medica

l



                                                                 Group

 

     Prescriptio Prescriptio           No                       Prescripti      

     [facility]



     n - Prior n - Prior                                    on - Prior          

 da



     Authorizati Authorizati                                    Authorizat      

     Medical



     on Request on Request                                    ion            [facility]

up



                                                  Request           

 

     progesteron progesteron           No             1capsul BID  progestero   

        [facility]



     e    e                             e(s)      ne             da



     micronized micronized                                    micronized        

   Medical



     200 mg 200 mg                                    200 mg           Group



     capsule capsule                                    capsule           



     Take 1 Take 1                                    Take 1           



     capsule capsule                                    capsule           



     twice a day twice a day                                    twice a         

  



     by oral by oral                                    day by           



     route. route.                                    oral           



                                                  route.           







Immunizations







           Ordered    Filled Immunization Date       Status     Comments   [facility]

e



           Immunization Name Name                                        

 

           COVID-19   COVID-19   2021 Completed             [facility]

[facility]



           (SARS-COV-2) (SARS-COV-2) 00:00:00                         Group



           vaccine,   vaccine,                                    



           unspecified unspecified                                  

 

           COVID-19   COVID-19   2021 Completed             [facility]

[facility]



           (SARS-COV-2) (SARS-COV-2) 00:00:00                         Group



           vaccine,   vaccine,                                    



           unspecified unspecified                                  

 

           COVID-19   COVID-19   2021 Completed             [facility]

[facility]



           (SARS-COV-2) (SARS-COV-2) 00:00:00                         Group



           vaccine,   vaccine,                                    



           unspecified unspecified                                  

 

           COVID-19   COVID-19   2021 Completed             [facility]

[facility]



           (SARS-COV-2) (SARS-COV-2) 00:00:00                         Group



           vaccine,   vaccine,                                    



           unspecified unspecified                                  

 

           COVID-19   COVID-19   2021 Completed             [facility]

[facility]



           (SARS-COV-2) (SARS-COV-2) 00:00:00                         Group



           vaccine,   vaccine,                                    



           unspecified unspecified                                  

 

           COVID-19   COVID-19   2021 Completed             [facility]

[facility]



           (SARS-COV-2) (SARS-COV-2) 00:00:00                         Group



           vaccine,   vaccine,                                    



           unspecified unspecified                                  

 

           COVID-19   COVID-19   2021 Completed             [facility]

[facility]



           (SARS-COV-2) (SARS-COV-2) 00:00:00                         Group



           vaccine,   vaccine,                                    



           unspecified unspecified                                  

 

           COVID-19   COVID-19   2021 Completed             [facility]

[facility]



           (SARS-COV-2) (SARS-COV-2) 00:00:00                         Group



           vaccine,   vaccine,                                    



           unspecified unspecified                                  

 

           COVID-19   COVID-19   2021 Completed             [facility]

[facility]



           (SARS-COV-2) (SARS-COV-2) 00:00:00                         Group



           vaccine,   vaccine,                                    



           unspecified unspecified                                  

 

           COVID-19   COVID-19   2021 Completed             [facility]

[facility]



           (SARS-COV-2) (SARS-COV-2) 00:00:00                         Group



           vaccine,   vaccine,                                    



           unspecified unspecified                                  

 

           COVID-19   COVID-19   2021 Completed             [facility]

[facility]



           (SARS-COV-2) (SARS-COV-2) 00:00:00                         Group



           vaccine,   vaccine,                                    



           unspecified unspecified                                  

 

           COVID-19   COVID-19   2021 Completed             [facility]

[facility]



           (SARS-COV-2) (SARS-COV-2) 00:00:00                         Group



           vaccine,   vaccine,                                    



           unspecified unspecified                                  

 

           HEP B, Adult Dosage            2018 Completed             [facility]

[facility]



                                 00:00:00                         [facility]

 

           Hep B, adult Hep B, adult 2018 Completed             [facility]



                                 00:00:00                         [facility]

 

           Hep B, adult Hep B, adult 2018 Completed             [facility]



                                 00:00:00                         [facility]

 

           Hep B, adult Hep B, adult 2018 Completed             [facility]



                                 00:00:00                         Group

 

           Hep B, adult Hep B, adult 2018 Completed             [facility]



                                 00:00:00                         Group

 

           Hep B, adult Hep B, adult 2018 Completed             [facility]



                                 00:00:00                         Group

 

           Hep B, adult Hep B, adult 2018 Completed             [facility]



                                 00:00:00                         Group

 

           Hep B, adult Hep B, adult 2018 Completed             [facility]



                                 00:00:00                         Group

 

           TDAP                  2018 Completed             [facility]



                                 00:00:00                         [facility]

 

           Tdap       Tdap       2018 Completed             [facility]

[facility]



                                 00:00:00                         Group

 

           Tdap       Tdap       2018 Completed             [facility]

[facility]



                                 00:00:00                         Group

 

           Tdap       Tdap       2018 Completed             [facility]

[facility]



                                 00:00:00                         Group

 

           Tdap       Tdap       2018 Completed             [facility]

[facility]



                                 00:00:00                         Group

 

           Tdap       Tdap       2018 Completed             [facility]

[facility]



                                 00:00:00                         Group

 

           Tdap       Tdap       2018 Completed             [facility]

[facility]



                                 00:00:00                         Group

 

           Tdap       Tdap       2018 Completed             [facility]

[facility]



                                 00:00:00                         Group







Vital Signs







             Vital Name   Observation Time Observation Value Comments     Source

 

             BP Diastolic 2023 00:00:00 87 mm[Hg]                 [facility]

[facility]



                                                                 [facility]

 

             Height       2023 00:00:00 63 [in_i]                 [facility]

[facility]



                                                                 [facility]

 

             BMI (Body Mass 2023 00:00:00 45.2 kg/m2                [facility]

[facility]



             Index)                                              Group

 

             BP Systolic  2023 00:00:00 139 mm[Hg]                [facility]

[facility]



                                                                 Group

 

             Body Weight  2023 00:00:00 255 [lb_av]               [facility]

[facility]



                                                                 Group

 

             BP Diastolic 2023 00:00:00 78 mm[Hg]                 [facility]

[facility]



                                                                 Group

 

             Height       2023 00:00:00 63 [in_i]                 [facility]

[facility]



                                                                 Group

 

             BMI (Body Mass 2023 00:00:00 46.8 kg/m2                [facility]



             Index)                                              Group

 

             BP Systolic  2023 00:00:00 125 mm[Hg]                [facility]

[facility]



                                                                 Group

 

             Body Weight  2023 00:00:00 264.1 [lb_av]              [facility]

[facility]



                                                                 [facility]

 

             BP Diastolic 2023 00:00:00 83 mm[Hg]                 [facility]

[facility]



                                                                 Group

 

             Height       2023 00:00:00 63 [in_i]                 [facility]

[facility]



                                                                 Group

 

             BMI (Body Mass 2023 00:00:00 45.8 kg/m2                [facility]



             Index)                                              Group

 

             BP Systolic  2023 00:00:00 146 mm[Hg]                [facility]

[facility]



                                                                 Group

 

             Body Weight  2023 00:00:00 258.4 [lb_av]              [facility]

[facility]



                                                                 Group

 

             BP Diastolic 2023 00:00:00 83 mm[Hg]                 [facility]

[facility]



                                                                 Group

 

             Height       2023 00:00:00 63 [in_i]                 [facility]

[facility]



                                                                 Group

 

             BMI (Body Mass 2023 00:00:00 47.7 kg/m2                [facility]



             Index)                                              Group

 

             BP Systolic  2023 00:00:00 136 mm[Hg]                [facility]

[facility]



                                                                 Group

 

             Body Weight  2023 00:00:00 269.5 [lb_av]              [facility]

[facility]



                                                                 Group

 

             Height       2023 00:00:00 63 [in_i]                 [facility]

[facility]



                                                                 Group

 

             BMI (Body Mass 2023 00:00:00 47.6 kg/m2                [facility]



             Index)                                              Group

 

             BP Systolic  2023 00:00:00 140 mm[Hg]                [facility]

[facility]



                                                                 Group

 

             Body Weight  2023 00:00:00 268.5 [lb_av]              [facility]

[facility]



                                                                 Group

 

             BP Diastolic 2023 00:00:00 94 mm[Hg]                 [facility]

[facility]



                                                                 Group

 

             Systolic blood 2021 02:00:00 142 mm[Hg]                Univer

sity of



             pressure                                            [facility]

 

             Diastolic blood 2021 02:00:00 101 mm[Hg]                Unive

rsity of



             [facility]

 

             Heart rate   2021 02:00:00 113 /min                  [facility]

 

             Body temperature 2021 02:00:00 37.61 Nubia                 [facility]

 

             Body height  2021 02:00:00 160 cm                    [facility]

 

             Body weight  2021 02:00:00 122.046 kg                [facility]

 

             BMI          2021 02:00:00 47.66 kg/m2               [facility]

 

             Oxygen saturation in 2021 02:00:00 96 /min                   

[facility]



             Arterial blood by                                        [facility]

jose ramon



             Pulse oximetry                                        [facility]

 

             BP Diastolic 2021-07-15 00:00:00 99 mm[Hg]                 [facility]

[facility]



                                                                 Group

 

             Height       2021-07-15 00:00:00 63 [in_i]                 [facility]

[facility]



                                                                 Group

 

             BMI (Body Mass 2021-07-15 00:00:00 46.1 kg/m2                [facility]



             Index)                                              Group

 

             BP Systolic  2021-07-15 00:00:00 150 mm[Hg]                [facility]

[facility]



                                                                 Group

 

             Body Weight  2021-07-15 00:00:00 4168 [oz_av]              [facility]

[facility]



                                                                 Group







Procedures







                Procedure       Date / Time     Performing Clinician Source



                                Performed                       

 

                ULTRASOUND, PREGNANT 2023 00:00:00                 [facility]

[facility]



                UTERUS REAL TIME WITH                                 Group



                IMAGE DOC, FETAL AND                                 



                MATERNAL EVAL PLUS                                 



                DETAILED FETAL ANATOMIC                                 



                EXAMINATION,                                    



                TRANSABDOMINAL APPROACH;                                 



                SINGLE OR FIRST GESTATION                                 

 

                US, obstetric, limited 2023 00:00:00                 [facility]

ord Medical



                                                                Group

 

                US, obstetric, limited 2023 00:00:00                 [facility]



                                                                Group

 

                US, obstetric, limited 2023 00:00:00                 [facility]



                                                                Group

 

                ULTRASOUND, PREGNANT 2023 00:00:00                 [facility]

[facility]



                UTERUS REAL TIME WITH                                 Group



                IMAGE DOCUMENTAITON,                                 



                TRANSVAGINAL                                    

 

                I&d Abscess Subfascial 2017 00:00:00                 [facility]

[facility]



                                                                Group

 

                Dilation and Curettage 2009 00:00:00                 [facility]

[facility]



                                                                Group

 

                Dilation and Curettage 2004 00:00:00                 [facility]



                                                                Group

 

                Dilation and Curettage 1999 00:00:00                 [facility]



                                                                Group







Plan of Care







             Planned Activity Planned Date Details      Comments     Source

 

             Diagnostic Test 2023   urinalysis,               [facility]

dical



             Pending      00:00:00     dipstick [code =              Group



                                       urinalysis,               



                                       dipstick]                 

 

             Diagnostic Test 2023   glucose tolerance              [facility]

[facility]



             Pending      00:00:00     test, 1-hour [code              Group



                                       = glucose tolerance              



                                       test, 1-hour]              

 

             Diagnostic Test 2023   hemoglobin A1c, QN,              [facility]

[facility]



             Pending      00:00:00     blood [code =              Group



                                       hemoglobin A1c, QN,              



                                       blood]                    

 

             Future Appointment 2023   Damian Bell, Alise              [facility]



                          08:45:00     [facility];              Group



                                       [address], [place] [address]              

 

             Future Appointment 2023    Sonogram, [address] [place], [place] [address]ger logan [facility]



                          08:30:00     [facility];              Group



                                       [address] [facility], [place] [address]              

 

             Future Appointment 2023   Lilo Stuart              [facility]



                          09:45:00     [address] [place] [address]; [address] [facility],              



                                       [place], [place]              



                                       [address]                

 

             Instructions                                        [facility]

al



                                                                 Group







Encounters







        Start   End     Encounter Admission Attending Care    Care    Encounter 

Source



        Date/Time Date/Time Type    Type    Clinicians Facility Department ID   

   

 

        2023 [id number]

[id number]



        09:30:00 09:30:00                 DAMIAN [id number] [facility]

 

        2023 Damian BELL     [place] -    [id number] CARIDAD silva



        00:00:00 00:00:00 [facility] des[facility] Bell MD: 600                         Medical         Medical



                        Hospital                         Network         Group



                        [place],                         [address]                                              



                        [address]                                         



                        , Ph. [phone]                                         



                        [phone]                                         

 

        2023 Outpatient         [facility]     MMG     2023 [facility]



        00:00:00 00:00:00                                         0531    da



                                                                        Medical



                                                                        Group

 

        2023 Outpatient         [facility]     MMG     2023 [facility]



        00:00:00 00:00:00                                         0601    da



                                                                        Medical



                                                                        Group

 

        2023 Outpatient         [facility]     MMG     2023 [facility]



        00:00:00 00:00:00                                         0522    da



                                                                        Medical



                                                                        Group

 

        2023 Abida                 MM     [place] -    [id number] M

[facility]



        00:00:00 00:00:00 [facility] desmond Guerrero



                        [facility]:                         [address]                                              



                        [address]                                         



                        , Ph. [phone]                                         



                        [phone]                                         

 

        2023 Outpatient         [facility] MM[facility]     MMG     2023 [facility]



        00:00:00 00:00:00                                         0417    da



                                                                        Medical



                                                                        Group

 

        2023 Outpatient         [facility]     MMG     2023 [facility]



        00:00:00 00:00:00                                         0503    da



                                                                        Medical



                                                                        Group

 

        2023 [facility]     [place] -    [id number] M

[facility]



        00:00:00 00:00:00 [facility] desmond Bell MD: [address] [place] [address]                                              



                        [address]                                         



                        , Ph. [phone]                                         



                        [phone]                                         

 

        2023 Outpatient         [facility] MM[facility]     MMG     2023 [place]



        00:00:00 00:00:00                                         0404    da



                                                                        Medical



                                                                        Group

 

        2023 Outpatient         [facility]as MMG     MMG     2023 [facility]



        00:00:00 00:00:00                                         0314    da



                                                                        Medical



                                                                        Group

 

        2023 Damian                 [facility]     [place] -    [id number] M

[facility]



        00:00:00 00:00:00 [facility] desmond Bell MD: [address]                         [facility] [address], [place]                                              



                        [address]                                         



                        , Ph. [phone]                                         



                        [phone]                                         

 

        2023 Outpatient         G_Pappas [facility]     2023 [place]



        00:00:00 00:00:00                                         0313    [facility]



                                                                        Group

 

        2023 Outpatient EL      Ray, [facility]    [id number]

[id number] [facility]



        15:41:00 15:41:00                 Damian [id number] [facility]

 

        2023 Outpatient         G_Pappas [facility]     2023 [facility]



        00:00:00 00:00:00                                         0227    [facility]

 

        2023 Damian                 [facility]     [place] -    [id number] M

[facility]



        00:00:00 00:00:00 [facility] desmond Bell MD: [address]                         [address] [address],                         [place], [place]                                              



                        [address]                                         



                        , Ph. [phone]                                         



                        [phone]                                         

 

        2023 Outpatient         A_Byrd  [facility]     [id number]

023 [facility]



        00:00:00 00:00:00                                         0224    [facility]

 

        2021 Outpatient BRITTANY COLES   [facility]    [id number]

[id number] Univers



        20:00:00 20:30:09                 NICKIE                          [facility]

 

        2021 Urgent          Mireya Akhtar [facility]    1.2.840.

114 [id number] 

Univers



        20:00:00 20:30:09 [facility]  [id number]      

   [facility] [id number]         Denis

as



                                                MARTINA?BLEA [phone]         [address]



                                                MEDICAL                 



                                                OFFICE                  



                                                BUILDING                 

 

        2021 Outpatient BRITTANY COLES   [facility]    [id number]

[id number] Univers



        20:00:00 20:30:09                 NICKIE                          [facility]

 

        2021 Telephone         Katie [facility]    1.2.840.114 [id number]

[id number] Univers



        00:00:00 00:00:00                 [facility]  [id number]         it

y [facility] [id number]         Denis

as



                                                MARTINA?BLEA [phone]         [address]



                                                MEDICAL                 



                                                OFFICE                  



                                                [facility]                 

 

        2021 Outpatient R       TURNER  [facility]    [id number]

[id number] Univers



        14:40:00 15:47:35                 AZUL                           lucas [facility]

 

        2021 Urgent          Azul Tompkins [facility]    1.2.840.114 [id number]

[id number] Univers



        14:40:00 15:00:00 Care            Unknown, [facility]  [id number]

         ity [facility] [id number]         Denis

as



                                                MARTINA?BLEA [phone]         [address]



                                                MEDICAL                 



                                                OFFICE                  



                                                [facility]                 

 

        2021 Outpatient R       RACHAEL [facility]    [id number]

[id number] Univers



        14:40:00 14:40:00                 [facility]

 

        2021 Outpatient         A_Byrd  MM     MM     [id number]

021 [facility]



        11:02:00 11:02:00                                         1117    da



                                                                        Medical



                                                                        Group

 

        2021-10-28 2021-10-28 Outpatient         A_Byrd  MMG     MMG     [id number]

021 [facility]



        01:57:00 01:57:00                                         1028    da



                                                                        Medical



                                                                        Group

 

        2021 Outpatient         A_Byrd  MMG     MMG     [id number]

021 [facility]



        01:40:00 01:40:00                                         0923    da



                                                                        Medical



                                                                        Group

 

        2021 Outpatient ESPERANZA HENAO,  [facility]    [id number]

418 [facility]



        00:13:00 00:13:00                 MEGAN [id number] [facility]

 

        2021 Outpatient         A_Byrd  MMG     MMG     [id number]

021 [facility]



        01:31:00 01:31:00                                         0819    



                                                                        Medical



                                                                        Group

 

        2021 Emergency ER      , [facility]    [id number]

[id number] [facility]



        07:05:00 07:50:00                 WAS                   -[id number] [facility]

 

        2021-07-15 2021-07-15 Outpatient         A_Byrd  MM     MM     [id number]

021 [facility]



        04:46:00 04:46:00                                         0715    



                                                                        Medical



                                                                        Group

 

        2021-07-15 2021-07-15 Outpatient         A_Byrd  MMG     MM     [id number]

021 [facility]



        04:46:00 04:46:00                                         0717    



                                                                        Medical



                                                                        Group

 

        2021-07-15 2021-07-15 Jerry LONDON                  MM     [place] -    [id number] M

[facility]



        00:00:00 00:00:00 MD Minor:                         Discovery         [address]         Group



                        [facility]         



                        [facility] [address],                         [facility]         



                        [place]                                              



                        [address]                                         



                        , Ph.                                           



                        [phone]                                         

 

        2021 [facility]         [facility]     [id number] [facility]



        04:06:00 04:06:00                                         0714    



                                                                        Medical



                                                                        Group

 

        2021 Refill          Doctor  [facility]    1.2.840.114 [id number]

05 



        00:00:00 00:00:00                 Unassigned, Health  [id number]       

  



                                        [place] [id number]         



                                                Professio [phone]         



                                                nal     [facility]             



                                                Office                  



                                                Building                 



                                                One                     

 

        2021 Refill          Alejandra, [facility]    1.2.840.114 [id number]

04 



        00:00:00 00:00:00                 Lizeth [facility]  [id number]         



                                                [place] [id number]         



                                                Professio [phone]         



                                                nal     [facility]             



                                                Office                  



                                                Building                 



                                                One                     

 

        2021 Refill          Doctor  [facility]    1.2.840.114 [id number]

05 Univers



        00:00:00 00:00:00                 Unassigned, Health  [id number]       

  ity of



                                        [place] [id number]         Denis

as



                                                Professio [phone]         [facility]     [address]             [place]



                                                Office                  



                                                Building                 



                                                One                     

 

        2021 [facility]          [facility]    1.2.840.114 [id number]

04 Univers



        00:00:00 00:00:00                 Lizeth [facility]  350.1.13.10         i

ty of



                                                [place] [id number]         Denis

as



                                                Professio [phone]         [facility]     [address]             [place]



                                                Office                  



                                                Building                 



                                                One                     

 

        2021 Patient         Isac  [facility]    1.2.840.114 [id number]

[id number] 



        00:00:00 00:00:00 Outreach         Skinny  PRIMARY [id number]         



                                        [facility]    [id number]         



                                                PAVILLION [phone]         



                                                        [facility]             

 

        2021 Patient         [facility]    1.2.840.114 [id number]

79 Univers



        00:00:00 00:00:00 Outreach         Skinny  PRIMARY 350.1.13.10         i

ty of



                                        [facility]    [id number]         [place]

s



                                                PAVILLION [phone]         [address]

 

        2020 [facility]         [facility]     2021 [place]



        02:19:00 02:19:00                                         [id number]    



                                                                        Medical



                                                                        Group

 

        2020 Outpatient         G_Pappas MMG     MMG     2020 [place]



        02:19:00 02:19:00                                         1118    



                                                                        Medical



                                                                        Group

 

        2020 [facility]          [facility]    1.2.840.114 [id number]

72 



        00:00:00 00:00:00                 [facility]  350.1.13.10         



                                                [place] [id number]         



                                                Profess[facility] [phone]         



                                                [place] [address]             



                                                Office                  



                                                [facility]                 



                                                One                     

 

        2020 Refill          [facility]    1.2.840.114 [id number]

79 



        00:00:00 00:00:00                 [facility]  350.1.13.10         



                                                [place] [id number]         



                                                Profess[facility] [phone]         



                                                [place] [address]             



                                                Office                  



                                                Building                 



                                                One                     

 

        2020 Refill          Alejandra, [facility]    1.2.840.114 [id number]

72 [facility]



        00:00:00 00:00:00                 [facility]  350.1.13.10         i

ty of



                                                [place] [id number]         [place]

as



                                                Professio [phone]         [address]



                                                Office                  



                                                Building                 



                                                One                     

 

        [facility]    1.2.840.114 [id number]

79 [facility]



        00:00:00 00:00:00                 [facility]  350.1.13.10         it

y of



                                                [place] [id number]         [place]

as



                                                Professio [phone]         [address]



                                                Office                  



                                                Building                 



                                                One                     

 

        2020 [facility]    1.2.840.114 [id number]

57 



        00:00:00 00:00:00                 [facility]  350.1.13.10         



                                                [place] [id number]         



                                                Professio [phone]         



                                                [place] [address]             



                                                Office                  



                                                Building                 



                                                One                     

 

        2020 [facility]    1.2.840.114 [id number]

57 [facility]



        00:00:00 00:00:00                 [facility]  350.1.13.10         it

y of



                                                [place] [id number]         [place]

as



                                                Professio [phone]         [address]



                                                Office                  



                                                Building                 



                                                One                     

 

        2020 Refill          Alejandra, [facility]    1.2.840.114 [id number]

24 



        00:00:00 00:00:00                 [facility]  350.1.13.10         



                                                [place] [id number]         



                                                Profess[facility] [phone]         



                                                [place] [address]             



                                                Office                  



                                                Building                 



                                                One                     

 

        2020 Refill          Alejandra, [facility]    1.2.840.114 [id number]

24 [facility]



        00:00:00 00:00:00                 [facility]  350.1.13.10         i

ty of



                                                [place] [id number]         [place]

as



                                                Professio [phone]         [address]



                                                Office                  



                                                Building                 



                                                One                     

 

        2020 Office          Alejandra [facility]    1.2.840.114 [id number]

86 



        13:23:59 14:46:46 Visit           [facility]  350.1.13.10         



                                                [place] [id number]         



                                                Professio [phone]         



                                                nal     044             



                                                Office                  



                                                Building                 



                                                One                     

 

        2020 Office          Alejandra [facility]    1.2.840.114 [id number]

86 [facility]



        13:23:59 14:46:46 Visit           [facility]  350.1.13.10         i

ty of



                                                [place] [id number]         [place]

as



                                                Professio [phone]         Me

dical



                                                nal     [address]             [place]



                                                Office                  



                                                Building                 



                                                One                     

 

        2020 Orders          Doctor  [facility]    1.2.840.114 [id number]

14 [facility]



        00:00:00 00:00:00 Only            Unassigned, FRANCHESCA   350.1.13.10       

  ity of



                                        [facility] [id number]         [place]

as



                                                        [phone]         [address]

 

        2019 Emergency ER      FRAME,  [facility]    [id number]

[address] [place]



        03:35:00 05:09:00                 KRIS                  -[id number] [facility]

 

        2017-10-06 2017-10-06 Outpatient Jerry Celestin [facility]    D000

[id number] [place]



        09:55:00 09:55:00                                         -2017 [facility]

 

        2017 Outpatient ESPERANZA      Salazar Jerry [facility]    D000

[id number] [place]



        09:27:00 09:27:00                                         -2017 [facility]

 

        2017 Outpatient ESPERANZA Salazar Alan [facility]    D000

[id number] [place]



        14:49:00 14:49:00                                         -2017 [facility]

 

        2014 Inpatient EL      Ray, [facility]    MOB     [id number]

18 [place]



        09:54:00 15:55:00                 Damian                 -[id number] [facility]

 

        2014-01-10 2014 Inpatient EL      Ray, [facility]     [id number]

18 [place]



        12:07:00 03:26:00                 Damian                 -[id number] [facility]

 

        2013 Emergency ER      Ray, [facility]    [id number]

18 [place]



        14:53:00 16:50:00                 Damian [id number] [facility]

 

        2009 Inpatient EL      Ray, [facility]     [id number]

18 [place]



        04:49:00 16:30:00                 Damian [id number] [facility]

 

        2009 Inpatient ER      Ray, [facility]     [id number]

18 [place]



        00:40:00 14:05:00                 Damian [id number] [facility]







Results







           Test Description Test Time  Test Comments Results    Result Comments 

Source









                    Glucose [Mass/volume] in Serum or Plasma --1 hour post dose 

2023 09:22:00 



                    glucose                                 









                      Test Item  Value      Reference Range Interpretation Comme

nts









             Results (test code = Results) 179                                  

  



[facility] - [id number] 
08:50:00





             Test Item    Value        Reference Range Interpretation Comments

 

             Leukocytes (test code = Leukocytes) Negative                       

        

 

             Nitrite (test code = Nitrite) negative                             

  

 

             Urobilinogen (test code = .2                                     



             Urobilinogen)                                        

 

             Protein (test code = Protein) 30                                   

  

 

             pH (test code = pH) 5.5                                    

 

             Blood (test code = Blood) Negative                               

 

             Specific Gravity (test code = 1.030                                

  



             Specific Gravity)                                        

 

             Ketone (test code = Ketone) Moderate                               

 

             Bilirubin (test code = Bilirubin) Negative                         

      

 

             Glucose (test code = Glucose) Negative                             

  

 

             Appearance (test code = Appearance) Clear                          

        

 

             Color (test code = Color) Yellow                                 



[facility] - [id number] 
10:06:00





             Test Item    Value        Reference Range Interpretation Comments

 

             Leukocytes (test code = Leukocytes) Negative                       

        

 

             Nitrite (test code = Nitrite) negative                             

  

 

             Urobilinogen (test code = .2                                     



             Urobilinogen)                                        

 

             Protein (test code = Protein) Negative                             

  

 

             pH (test code = pH) 6.0                                    

 

             Blood (test code = Blood) Negative                               

 

             Specific Gravity (test code = 1.030                                

  



             Specific Gravity)                                        

 

             Ketone (test code = Ketone) Small                                  

 

             Bilirubin (test code = Bilirubin) Negative                         

      

 

             Glucose (test code = Glucose) 1000                                 

  

 

             Appearance (test code = Appearance) Clear                          

        

 

             Color (test code = Color) Yellow                                 



[facility] - [id number] 
09:00:26





             Test Item    Value        Reference Range Interpretation Comments

 

             Leukocytes (test code = Leukocytes) Negative                       

        

 

             Nitrite (test code = Nitrite) negative                             

  

 

             Urobilinogen (test code = .2                                     



             Urobilinogen)                                        

 

             Protein (test code = Protein) Negative                             

  

 

             pH (test code = pH) 5.5                                    

 

             Blood (test code = Blood) Negative                               

 

             Specific Gravity (test code = 1.030                                

  



             Specific Gravity)                                        

 

             Ketone (test code = Ketone) Negative                               

 

             Bilirubin (test code = Bilirubin) Negative                         

      

 

             Glucose (test code = Glucose) 100                                  

  

 

             Appearance (test code = Appearance) Clear                          

        

 

             Color (test code = Color) Yellow                                 



[facility] - [id number] 
09:00:26





             Test Item    Value        Reference Range Interpretation Comments

 

             Leukocytes (test code = Leukocytes) Negative                       

        

 

             Nitrite (test code = Nitrite) negative                             

  

 

             Urobilinogen (test code = .2                                     



             Urobilinogen)                                        

 

             Protein (test code = Protein) Negative                             

  

 

             pH (test code = pH) 5.5                                    

 

             Blood (test code = Blood) Negative                               

 

             Specific Gravity (test code = 1.030                                

  



             Specific Gravity)                                        

 

             Ketone (test code = Ketone) Negative                               

 

             Bilirubin (test code = Bilirubin) Negative                         

      

 

             Glucose (test code = Glucose) 100                                  

  

 

             Appearance (test code = Appearance) Clear                          

        

 

             Color (test code = Color) Yellow                                 



[facility] 13+18+21+X+Y aneuploidy in Blood by Molecular 
genetics method [id number] 00:00:00





             Test Item    Value        Reference Range Interpretation Comments

 

             report summary (test code low risk                               



             = report summary)                                        

 

             report note (test code = see notes                              



             report note)                                        

 

             trisomy 13 age-based risk 1/858 (0.12%)                           



             text (test code = trisomy                                        



             13 age-based risk text)                                        

 

             trisomy 13 risk score text <1/10,000 (<0.01%)                      

     



             (test code = trisomy 13                                        



             risk score text)                                        

 

             trisomy 13 result text low risk                               



             (test code = trisomy 13                                        



             result text)                                        

 

             trisomy 18 age-based risk 1/272 (0.37%)                           



             text (test code = trisomy                                        



             18 age-based risk text)                                        

 

             trisomy 18 risk score text <1/10,000 (<0.01%)                      

     



             (test code = trisomy 18                                        



             risk score text)                                        

 

             trisomy 18 result text low risk                               



             (test code = trisomy 18                                        



             result text)                                        

 

             trisomy 21 age-based risk 1/117 (0.85%)                           



             text (test code = trisomy                                        



             21 age-based risk text)                                        

 

             trisomy 21 risk score text <1/10,000 (<0.01%)                      

     



             (test code = trisomy 21                                        



             risk score text)                                        

 

             trisomy 21 result text low risk                               



             (test code = trisomy 21                                        



             result text)                                        

 

             monosomy X age-based risk 1/255 (0.39%)                           



             text (test code = monosomy                                        



             X age-based risk text)                                        

 

             monosomy X risk score text <1/10,000 (<0.01%)                      

     



             (test code = monosomy X                                        



             risk score text)                                        

 

             monosomy X result text low risk                               



             (test code = monosomy X                                        



             result text)                                        

 

             triploidy result text low risk                               



             (test code = triploidy                                        



             result text)                                        

 

             gender of fetus (test code male                                   



             = gender of fetus)                                        

 

             fetal fraction (test code 3.1%                                   



             = fetal fraction)                                        

 

             footnotes (test code = see notes                              



             footnotes)                                          



[facility] Spec[facility] by Molecular genetics method 
[id number] 00:00:00





             Test Item    Value        Reference Range Interpretation Comments

 

             report summary (test code = report negative                        

       



             summary)                                            

 

             alpha-thalassemia (test code = negative                            

   



             alpha-thalassemia)                                        

 

             beta-hemoglobinopathies (test code negative                        

       



             = beta-hemoglobinopathies)                                        

 

             canavan disease (test code = negative                              

 



             canavan disease)                                        

 

             cystic fibrosis (test code = cystic negative                       

        



             fibrosis)                                           

 

             duchenne/womack muscular dystrophy negative                        

       



             (test code = duchenne/womack                                       

 



             muscular dystrophy)                                        

 

             familial dysautonomia (test code = negative                        

       



             familial dysautonomia)                                        

 

             fragile X syndrome (test code = negative                           

    



             fragile X syndrome)                                        

 

             galactosemia (test code = negative                               



             galactosemia)                                        

 

             gaucher disease (test code = negative                              

 



             gaucher disease)                                        

 

             medium chain acyl-coa dehydrogenase negative                       

        



             deficiency (test code = medium                                     

   



             chain acyl-coa dehydrogenase                                       

 



             deficiency)                                         

 

             polycystic kidney disease, negative                               



             autosomal recessive (test code =                                   

     



             polycystic kidney disease,                                        



             autosomal recessive)                                        

 

             smith-lemli-opitz syndrome (test negative                          

     



             code = smith-lemli-opitz syndrome)                                 

       

 

             spinal muscular atrophy (test code negative                        

       



             = spinal muscular atrophy)                                        

 

             rosalva-sachs disease (test code = negative                            

   



             rosalva-sachs disease)                                        

 

             panel notes (test code = panel see notes                           

   



             notes)                                              

 

             report note (test code = report see notes                          

    



             note)                                               

 

             footnotes (test code = footnotes) see notes                        

      



[facility] Group[facility]sc[facility] observation [Identifier] in Vaginal fluid by 
Wet [id number] 09:24:31





             Test Item    Value        Reference Range Interpretation Comments

 

             Clue Cells (test code = Clue Cells) negative                       

        

 

             WBCs (test code = WBCs) negative                               

 

             Trichomonads (test code = negative                               



             Trichomonads)                                        

 

             Epithelial cells (test code = normal                               

  



             Epithelial cells)                                        

 

             RBCs (test code = RBCs) negative                               



[facility] GroupMicroscopic observation [Identifier] in Vaginal fluid by 
Wet [id number] 09:24:31





             Test Item    Value        Reference Range Interpretation Comments

 

             Clue Cells (test code = Clue Cells) negative                       

        

 

             WBCs (test code = WBCs) negative                               

 

             Trichomonads (test code = negative                               



             Trichomonads)                                        

 

             Epithelial cells (test code = normal                               

  



             Epithelial cells)                                        

 

             RBCs (test code = RBCs) negative                               



[facility] (dipstick) panel - [id number] 
08:42:44





             Test Item    Value        Reference Range Interpretation Comments

 

             Leukocytes (test code = Leukocytes) Negative                       

        

 

             Nitrite (test code = Nitrite) negative                             

  

 

             Urobilinogen (test code = .2                                     



             Urobilinogen)                                        

 

             Protein (test code = Protein) Negative                             

  

 

             pH (test code = pH) 6.0                                    

 

             Blood (test code = Blood) Negative                               

 

             Specific Gravity (test code = 1.030                                

  



             Specific Gravity)                                        

 

             Ketone (test code = Ketone) Negative                               

 

             Bilirubin (test code = Bilirubin) Negative                         

      

 

             Glucose (test code = Glucose) Negative                             

  

 

             Appearance (test code = Appearance) Clear                          

        

 

             Color (test code = Color) Yellow                                 



[facility]dip[facility] - [id number] 
08:42:44





             Test Item    Value        Reference Range Interpretation Comments

 

             Leukocytes (test code = Leukocytes) Negative                       

        

 

             Nitrite (test code = Nitrite) negative                             

  

 

             Urobilinogen (test code = .2                                     



             Urobilinogen)                                        

 

             Protein (test code = Protein) Negative                             

  

 

             pH (test code = pH) 6.0                                    

 

             Blood (test code = Blood) Negative                               

 

             Specific Gravity (test code = 1.030                                

  



             Specific Gravity)                                        

 

             Ketone (test code = Ketone) Negative                               

 

             Bilirubin (test code = Bilirubin) Negative                         

      

 

             Glucose (test code = Glucose) Negative                             

  

 

             Appearance (test code = Appearance) Clear                          

        

 

             Color (test code = Color) Yellow                                 



[facility],urine pres id [id number] 08:45:00





             Test Item    Value        Reference Range Interpretation Comments

 

             culture,urine (test scant skin yonatan                           



             code = culture,urine) present. pathogen not                        

   



                          present at 2 days.                           



[facility] [Presence] in Serum by [id number] 13:57:00





             Test Item    Value        Reference Range Interpretation Comments

 

             RPR (test code = RPR) nonreactive  nonreactive               



[facility] HospitalBacteria identified in Urine by [id number] 
08:42:00





             Test Item    Value        Reference Range Interpretation Comments

 

             culture,urine (test scant skin yonatan                           



             code = culture,urine) present. pathogen not                        

   



                          present at 1 day.                           



[facility], [facility] + reflex to HR HPV if [id number] 00:00:00





             Test Item    Value        Reference Range Interpretation Comments

 

             TP reflex HPV ASCUS (test code = TP abnormal                  A    

        



             reflex HPV ASCUS)                                        

 

             HPV (test code = HPV) normal                                 



[id number] 19:34:00





             Test Item    Value        Reference Range Interpretation Comments

 

             CT (test code = CT) CT not detected                           

 

             NG (test code = NG) NG not detected                           



[facility] HospitalRubella virus Ab [Titer] in [id number] 18:53:00





             Test Item    Value        Reference Range Interpretation Comments

 

             rubella IgG (test code = rubella IgG) > 500                        

          



[facility] HospitalHIV 1+2 Ab [Presence] in [id number] 18:20:00





             Test Item    Value        Reference Range Interpretation Comments

 

             HIV P24 Ag (test code = HIV P24 non-reactive nonreactive           

    



             Ag)                                                 

 

             HIV-1/2 Ab (test code = HIV-1/2 non-reactive nonreactive           

    



             Ab)                                                 



[facility] HospitalHemoglobin A1c [Mass/volume] in [id number] 18:04:00





             Test Item    Value        Reference Range Interpretation Comments

 

             Hemoglobin A1c/Hemoglobin.total in 6.8 %        4.0-6.0      H     

       



             Blood (test code = 4548-4)                                        



[facility] HospitalMicroscopic observation [Identifier] in Vaginal fluid by 
Wet [id number] 17:59:10





             Test Item    Value        Reference Range Interpretation Comments

 

             Clue Cells (test code = Clue Cells) negative                       

        

 

             WBCs (test code = WBCs) negative                               

 

             Trichomonads (test code = negative                               



             Trichomonads)                                        

 

             Epithelial cells (test code = normal                               

  



             Epithelial cells)                                        

 

             RBCs (test code = RBCs) negative                               



[facility] HospitalMicroscopic observation [Identifier] in Vaginal fluid by 
Wet [id number] 17:59:10





             Test Item    Value        Reference Range Interpretation Comments

 

             Clue Cells (test code = Clue Cells) negative                       

        

 

             WBCs (test code = WBCs) negative                               

 

             Trichomonads (test code = negative                               



             Trichomonads)                                        

 

             Epithelial cells (test code = normal                               

  



             Epithelial cells)                                        

 

             RBCs (test code = RBCs) negative                               



[facility] HospitalCB W Auto Differential panel - [id number] 17:53:00





             Test Item    Value        Reference Range Interpretation Comments

 

             white blood count (test code = 8.4 K/uL     4.0-11.5               

   



             white blood count)                                        

 

             red blood count (test code = red 4.57 M/uL    3.80-5.20            

     



             blood count)                                        

 

             hemoglobin (test code = 13.2 g/dL    10.5-15.7                 



             hemoglobin)                                         

 

             hematocrit (test code = 41.1 %       34.0-50.0                 



             hematocrit)                                         

 

             mean corpuscular volume (test code 89.9 fL      86.0-100.0         

       



             = mean corpuscular volume)                                        

 

             mean corpuscular hemoglobin (test 28.9 pg      26.2-33.4           

      



             code = mean corpuscular                                        



             hemoglobin)                                         

 

             mean corpuscular HGB conc (test 32.1 g/dL    30.0-34.0             

    



             code = mean corpuscular HGB conc)                                  

      

 

             red cell distribution width (test 15.5 %       12.0-15.5           

      



             code = red cell distribution                                       

 



             width)                                              

 

             platelet count (test code = 277 K/uL     165-450                   



             platelet count)                                        

 

             mean platelet volume (test code = 10.0 fL      9.4-12.6            

      



             mean platelet volume)                                        

 

             neutrophils % (test code = 70.6 %       44.4-80.1                 



             neutrophils %)                                        

 

             Ig% (test code = Ig%) 0.7 %        0.0-0.4      H            

 

             lymphocyte% (test code = 21.8 %       10.0-50.0                 



             lymphocyte%)                                        

 

             mono % (test code = mono %) 5.8 %        3.6-12.0                  

 

             eos % (test code = eos %) 0.7 %        0.0-5.4                   

 

             basophil % (test code = basophil 0.4 %        0.1-1.2              

     



             %)                                                  

 

             absolute neutrophil count (test 5.94 K/uL    1.56-6.13             

    



             code = absolute neutrophil count)                                  

      

 

             Ig# (test code = Ig#) 0.06 K/uL    0.00-0.03    H            

 

             lymph # (test code = lymph #) 1.83 K/uL    1.18-3.74               

  

 

             mono # (test code = mono #) 0.49 K/uL    0.24-0.86                 

 

             eos # (test code = eos #) 0.06 K/uL    0.04-0.36                 

 

             basophil # (test code = basophil 0.03 K/uL    0.01-0.08            

     



             #)                                                  

 

             NRBC% (test code = NRBC%) 0 /100 WBC   0-0.2                     

 

             NRBC# (test code = NRBC#) 0 K/uL                                 



[facility] GroupABO and Rh group [Type] in [id number] 16:03:00





             Test Item    Value        Reference Range Interpretation Comments

 

             blood type (test code = blood type) an                             

        

 

             ind gerardo (test code = ind gerardo) negative                       

        



[facility] HospitalUrinalysis macro (dipstick) panel - [id number] 
15:21:00





             Test Item    Value        Reference Range Interpretation Comments

 

             Leukocytes (test code = Leukocytes) Negative                       

        

 

             Nitrite (test code = Nitrite) negative                             

  

 

             Urobilinogen (test code = .2                                     



             Urobilinogen)                                        

 

             Protein (test code = Protein) Negative                             

  

 

             pH (test code = pH) 5.5                                    

 

             Blood (test code = Blood) Negative                               

 

             Specific Gravity (test code = 1.020                                

  



             Specific Gravity)                                        

 

             Ketone (test code = Ketone) Small                                  

 

             Bilirubin (test code = Bilirubin) Negative                         

      

 

             Glucose (test code = Glucose) 500                                  

  

 

             Appearance (test code = Appearance) Clear                          

        

 

             Color (test code = Color) Yellow                                 



[facility] HospitalUrinalysis macro (dipstick) panel - [id number] 
15:21:00





             Test Item    Value        Reference Range Interpretation Comments

 

             Leukocytes (test code = Leukocytes) Negative                       

        

 

             Nitrite (test code = Nitrite) negative                             

  

 

             Urobilinogen (test code = .2                                     



             Urobilinogen)                                        

 

             Protein (test code = Protein) Negative                             

  

 

             pH (test code = pH) 5.5                                    

 

             Blood (test code = Blood) Negative                               

 

             Specific Gravity (test code = 1.020                                

  



             Specific Gravity)                                        

 

             Ketone (test code = Ketone) Small                                  

 

             Bilirubin (test code = Bilirubin) Negative                         

      

 

             Glucose (test code = Glucose) 500                                  

  

 

             Appearance (test code = Appearance) Clear                          

        

 

             Color (test code = Color) Yellow                                 



[facility]Urinalysis macro (dipstick) panel - [id number] 
15:21:00





             Test Item    Value        Reference Range Interpretation Comments

 

             Leukocytes (test code = Leukocytes) Negative                       

        

 

             Nitrite (test code = Nitrite) negative                             

  

 

             Urobilinogen (test code = .2                                     



             Urobilinogen)                                        

 

             Protein (test code = Protein) Negative                             

  

 

             pH (test code = pH) 5.5                                    

 

             Blood (test code = Blood) Negative                               

 

             Specific Gravity (test code = 1.020                                

  



             Specific Gravity)                                        

 

             Ketone (test code = Ketone) Small                                  

 

             Bilirubin (test code = Bilirubin) Negative                         

      

 

             Glucose (test code = Glucose) 500                                  

  

 

             Appearance (test code = Appearance) Clear                          

        

 

             Color (test code = Color) Yellow                                 



[facility] HospitalGlucose [Mass/volume] in Capillary [id number] 
14:35:00





             Test Item    Value        Reference Range Interpretation Comments

 

             GLU (test code = GLU) 211                                    



[facility]pregnancy test, [id number] 14:35:00





             Test Item    Value        Reference Range Interpretation Comments

 

             Pregnancy Test (test code = positive                               



             Pregnancy Test)                                        



[facility] HospitalGlucose [Mass/volume] in Capillary [id number] 
14:35:00





             Test Item    Value        Reference Range Interpretation Comments

 

             GLU (test code = GLU) 211                                    



[facility]pregnancy test, [id number] 14:35:00





             Test Item    Value        Reference Range Interpretation Comments

 

             Pregnancy Test (test code = positive                               



             Pregnancy Test)                                        



[facility] HospitalGlucose [Mass/volume] in Capillary [id number] 
14:35:00





             Test Item    Value        Reference Range Interpretation Comments

 

             GLU (test code = GLU) 211                                    



[facility]pregnancy test, [id number] 14:35:00





             Test Item    Value        Reference Range Interpretation Comments

 

             Pregnancy Test (test code = positive                               



             Pregnancy Test)                                        



[facility]

## 2023-06-04 NOTE — RAD REPORT
[facility]  [facility]

 

CLINICAL HISTORY:  CHEST PAIN

 

COMPARISON:  Chest Single View dated 7/28/2020; Chest Single View dated 6/22/2018; Chest Single View 
dated 9/20/2017

 

TECHNIQUE:   Portable AP view of the chest.

 

FINDINGS:  The lungs are clear.  No pneumothorax or effusion. The cardiomediastinal contours are unre
markable.

 

IMPRESSION:  No acute cardiopulmonary process.

## 2023-06-05 NOTE — EKG
Test Date:    2023-06-04               Test Time:    18:54:39

Technician:   HEMANT                                   

                                                     

MEASUREMENT RESULTS:                                       

Intervals:                                           

Rate:         94                                     

NE:           152                                    

QRSD:         80                                     

QT:           342                                    

QTc:          427                                    

Axis:                                                

P:            58                                     

NE:           152                                    

QRS:          26                                     

T:            31                                     

                                                     

INTERPRETIVE STATEMENTS:                                       

                                                     

Normal sinus rhythm with sinus arrhythmia

Low voltage QRS

Septal infarct, age undetermined

Abnormal ECG

Compared to ECG 02/11/2023 21:25:10

Low QRS voltage now present

Myocardial infarct finding still present



Electronically Signed On 06-05-23 10:13:36 CDT by Ernesto Joshua

## 2024-12-18 ENCOUNTER — HOSPITAL ENCOUNTER (EMERGENCY)
Dept: HOSPITAL 97 - ER | Age: 39
LOS: 1 days | Discharge: HOME | End: 2024-12-19
Payer: COMMERCIAL

## 2024-12-18 DIAGNOSIS — D75.9: ICD-10-CM

## 2024-12-18 DIAGNOSIS — I10: ICD-10-CM

## 2024-12-18 DIAGNOSIS — Z88.1: ICD-10-CM

## 2024-12-18 DIAGNOSIS — Z79.899: ICD-10-CM

## 2024-12-18 DIAGNOSIS — T78.1XXA: Primary | ICD-10-CM

## 2024-12-18 DIAGNOSIS — Z91.02: ICD-10-CM

## 2024-12-18 DIAGNOSIS — Z91.040: ICD-10-CM

## 2024-12-18 DIAGNOSIS — R06.00: ICD-10-CM

## 2024-12-18 PROCEDURE — 96375 TX/PRO/DX INJ NEW DRUG ADDON: CPT

## 2024-12-18 PROCEDURE — 96374 THER/PROPH/DIAG INJ IV PUSH: CPT

## 2024-12-18 PROCEDURE — 99284 EMERGENCY DEPT VISIT MOD MDM: CPT

## 2024-12-18 NOTE — XMS REPORT
Continuity of Care Document



                          Created on: 2024





SANDRABORIS CADE

External Reference #: [id number]

: 1985

Sex: Female



Demographics





                                        Address             [address] [place]  [address]

 

                                        Home Phone          [phone]

 

                                        Work Phone          [phone]

 

                                        Mobile Phone        [phone]

 

                                        Email Address       [email or web address]

 

                                        Preferred Language  en

 

                                        Marital Status      Unknown

 

                                        Religion Affiliation Unknown

 

                                        Race                Unknown

 

                                        Additional Race(s)  Unavailable

White

 

                                        Ethnic Group        Unknown





Author





                                        Name                Unknown

 

                                        Address             [address]

[place]  [address]

 

                                        [facility] [place]

[facility]

 

                                        Address             [address]

[address]

[place]  [address]

 

                                        Phone               [phone]





Support





                          Name         Relationship Address      Phone

 

                                FRAME KRIS ZAMORA Emergency Provider [address] [place], [place]  [id number] [phone]

 

                                FRITZ BAIRD Primary Care Physician FAMILY [address]  [address]                     [phone]

 

                                ROSS TULIO Next of Kin     [address] [place], [place]  [address] [phone]

 

                                MD JERRY SALAZAR Primary Care Physician [address] [place]  [address]                     [phone]

 

                                MD MANUELITO [place]  [address]                         [phone]

 

                                JOHNSON PEDERSON Emergency Contact [address]

[place]  [address]                     [phone]

 

                                Ross Tulio Spouse          [address] [place]  [address]                 [phone]





Care Team Providers





                                Care Team Member Name Role            Phone

 

                                PCP, PATIENT DOES NOT HAVE A Primary Care Physic

adriane Unavailable

 

                                DAMIAN BELL Attending Clinician Unavailabl

e

 

                                Katt        Attending Clinician Unavailable

 

                                SANDY CORCORAN   Attending Clinician Unavailable

 

                                Sandy Corcoran MD Attending Clinician [phone]

 

                                Doctor Unassigned, No Name Attending Clinician U

DINO Osei Attending Clinician Unavaila

vasile Jauregui          Attending Clinician Unavailable

 

                                NICKIE COLES    Attending Clinician Unavailable

 

                                Mireya Sharp Attending Clinician [phone]

 

                                Nickie Coles MD Attending Clinician [phone]

[phone]

 

                                Katie FNP, Sheri Attending Clinician [phone]

[phone]

 

                                AZUL TOMPKINS    Attending Clinician Unavailable

 

                                Ulisses FNP, Azul Attending Clinician [phone]

 

                                Unknown, Attending Attending Clinician Unavailab

le

 

                                UNKNOWN, ATTENDING Attending Clinician Unavailab

MEGAN Duenas    Attending Clinician Unavailable

 

                                MANUELITO DOWELL Attending Clinician Unavailable

 

                                Lizeth Montoya Attending Clinician [phone]

[phone]

 

                                Skinny Chau DO Attending Clinician [phone]

[phone]

 

                                Donovan Moran MD Attending Clinician [phone]

[phone]

 

                                KRIS MARTINEZ Attending Clinician Unavailable

 

                                JERRY SALAZAR      Attending Clinician Unavailable

 

                                SANDY CORCORAN   Admitting Clinician Unavailable

 

                                G_Pappas        Admitting Clinician Unavailable

 

                                DAMIAN BELL Admitting Clinician Unavailjosy Corcoran MD, Sandy Calderon Admitting Clinician [phone]

 

                                DEBBIE_Minor          Admitting Clinician Unavailable







Payers





                    Payer Name Policy Type Policy Number Effective Date Expirati

on Date Source

 

                                                    [employer] 

OUT OF NETWORK                          [id number]          2020-10-01 

00:00:00                                            

 

                                                    [employer] [place] 

OF [place]                                [id number]        2022 

00:00:00                                            

 

                                                    [place] 

[place]                                   [id number]        2022 

00:00:00                                            

 

                                                    [facility] BENEFITS 

MANAGEMENT                              [id number]          2020-10-01 

00:00:00                                            

 

                          [employer]                     [id number]   2016 

00:00:00                                            







Problems





                                                    Condition 

Name                                    Condition 

Details                                 Condition 

Category                  Status                    Onset 

Date                                    Resolution 

Date                                    Last 

Treatment 

Date                                    Treating 

Clinician                 Comments                  Source

 

                                                    Sterilizat

ion 

requested                               Sterilizat

ion 

Requested           Problem             Active              2023 

00:00:

00                                                               [facility]

 

                                                    Superficia

l 

dehiscence 

of wound                                Superficia

l 

Dehiscence 

of Wound            Problem             Active              2023 

00:00:

00                                                               [facility]

 

                                                    History of 

5 

miscarriag

es                                      History of 

5 

Miscarriag

es                  Problem             Active              2023 

00:00:

00                                                               [facility]

 

                                                    Blood 

group A 

Rh(D) 

negative                                Blood 

Group a 

Rh(D) 

Negative            Problem             Active              

7-19 

00:00:

00                                                               [facility]

 

[facility] 

[facility]

 

                                                    RhD 

negative                                RhD 

Negative            Problem             Active              

5-03 

00:00:

00                                                               [facility] 

Medical 

Group

 

                                                    Protein C 

deficiency 

disease                                 Protein C 

Deficiency 

Disease             Problem             Active              

4-12 

00:00:

00                                                               [facility] 

[facility] 

Group

 

                                                    Hypoplasmi

nogenemia                               Hypoplasmi

nogenemia           Problem             Active              

2 

00:00:

00                                                               [facility] 

[facility] 

[facility]

 

                                                    Depressive 

disorder                                Depressive 

Disorder            Problem             Active               

00:00:

00                                                               [facility] 

[facility] 

[facility]

 

                                                    History of 

pre-eclamp

adrian                                     History of 

Pre-eclamp

adrian                 Problem             Active               

00:00:

00                                                               [facility] 

Group

 

                                                    Maternal 

history of 

gestationa

l diabetes                              Maternal 

History of 

Gestationa

l Diabetes          Problem             Active               

00:00:

00                                                               [facility] 

Group

 

                                                    Body mass 

index 40+ 

- severely 

obese                                   Body Mass 

Index 40+ 

- Severely 

Obese               Problem             Active              

418 

00:00:

00                                                               [facility] 

[facility] 

[facility]

 

                                                    Benign 

essential 

hypertensi

on                                      Benign 

Essential 

Hypertensi

on                  Problem             Active               

00:00:

00                                                               [facility] 

[facility] 

[facility]

 

                                                    Nicotine 

dependence                              Nicotine 

Dependence          Problem             Active              

3-31 

00:00:

00                                                               [facility] 

Medical 

Group

 

                                                    Polycystic 

ovaries                                 Polycystic 

Ovaries             Problem             Active              

2-10 

00:00:

00                                                               [facility] 

[facility] 

[facility]

 

                                                    Blood 

coagulatio

n disorder                              Blood 

Coagulatio

n Disorder          Problem             Active               

00:00:

00                                                               [facility] 

[facility] 

[facility]

 

                      Anxiety    Anxiety    Problem    Active      

00:00:

00                                                               [facility] 

[facility] 

[facility]

 

                                                    Essential 

hypertensi

on                                      Essential 

Hypertensi

on                  Problem             Active               

00:00:

00                                                               [facility] 

[facility] 

[facility]

 

            Obesity Obesity Problem Active                               [facility] 

[facility] 

Group







Allergies, Adverse Reactions, Alerts





                                                    Allergy 

Name                                    Allergy 

Type            Status          Severity        Reaction(s)     Onset 

Date                                    Inactive 

Date                                    Treating 

Clinician                 Comments                  Source

 

                                                    Clindamy

cordell                                     Allergy 

to 

substanc

e               Active                                           

00:00:

00                                                              [facility] 

[facility] 

[facility]

 

                                                    CLINDAMY

CORDELL                                     DRUG 

INGREDI         Active                          Unknown-Cmnt     

00:00:

00                                                              [facility]

 

                                                    ERYTHROM

YCIN         DRUG         Active                    Unknown-Cmnt  

00:00:

00                                                              [facility]

 

                                        LATEX               DRUG 

INGREDI         Active                          Rash             

00:00:

00                                                              [facility]

 

                                                    Clindamy

cordell                                     Propensi

ty to 

adverse 

reaction

s                   Active                                  Unknown - 

See comments                             

00:00:

00                                                              [facility]

 

                                                    Erythrom

ycin                                    Propensi

ty to 

adverse 

reaction

s                   Active                                  Unknown - 

See comments                             

00:00:

00                                                              [facility]

[facility]

 

                                        Latex               Propensi

ty to 

adverse 

reaction

s               Active                          Rash             

00:00:

00                                                              [facility]

 

                                        Latex               Allergy 

to 

substanc

e               Active                                          2014-0

1-10 

00:00:

00                                                              [facility] 

[facility]







Social History





                    Social Habit Start Date Stop Date Quantity  Comments  Source

 

                                        ASSERTION           2023 

00:00:00                        Pregnancy                       [facility]

 

                                                    History SDOH 

Alcohol Frequency                                                     [facility]

 

                                                    History SDOH 

Alcohol Std Drinks                                                     [facility]

 

                                                    History SDOH 

Alcohol Binge                                                     [facility]

 

                                                    Exposure to 

SARS-CoV-2 (event)                           Not sure                  [facility]

 

                    Gender identity                                         [facility]

[facility]

 

                    [facility]                                         [facility]

 

                                                    History of tobacco 

use                                    Cigarette Smoker              [facility]

 

                                        Alcohol intake      2023 

00:00:00                                2023 

00:00:00                                Current drinker 

of alcohol 

(finding)                                           [facility]

 

                                                    History of Social 

function                                2019 

00:00:00                                2019 

00:00:00                                                    [facility]

 

                                        Alcohol Comment     2016 

00:00:00                                2016 

00:00:00            occasional                              [facility]

 

                                                    Tobacco use and 

exposure                                2016 

00:00:00                                2016 

00:00:00                                Smokeless tobacco 

non-user                                            [facility]

 

                                                    Cigarettes smoked 

current (pack per 

day) - Reported                         2016 

00:00:00                                2016 

00:00:00                                                    [facility]

 

                                                    Cigarette 

pack-years                              2016 

00:00:00                                2016 

00:00:00                                                    [facility]

 

                                                    Sex Assigned At 

Birth                                   1985 

00:00:[birth year]                                1985 

00:00:00                                                    [facility]







                          Smoking Status Start Date   Stop Date    Source

 

                          Light Tobacco Smoker                           [facility]

 

                          Smokes tobacco daily 2016 00:00:00              

[facility]







Medications





                                                    Ordered 

Medication 

Name                                    Filled 

Medication 

Name                                    Start 

Date                                    Stop 

Date                                    Current 

Medication?                             Ordering 

Clinician       Indication      Dosage          Frequency       Signature 

(SIG)               Comments            Components          Source

 

                                                    aspirin 

(ASPIRIN 

LOW DOSE) 

81 mg EC 

tablet                                               

23:29:

49                  Yes                           81mg                Take 81 mg

 

by mouth 

daily.                                                      [facility]

 

                                                    lisinopril-

hydrochloro

thiazide 

20-12.5 mg 

per tablet                                           

23:29:

49                  Yes                 [id number]  1{tbl}              Take 1 

tablet by 

mouth 

every 

morning.                                                    [facility]

 

                                                    methylPREDN

ISolone 

acetate 

(DEPO-MEDRO

L) 

injection 

80 mg                                               2021 

03:30:

00                                       

02:26

:00     No              [id number] 80mg                                    [facility]

 

                                                    albuterol 

(VENTOLIN) 

inhaler 2 

Puff                                                2021 

02:16:

00                                       

02:26

:00     No              [id number] 2{puff}                                 [facility]

 

                                                    ampicillin 

500 mg 

capsule                                             2021 

00:00:

00                                       

05:59

:00        No                    [id number]   500mg                 Take 1 

capsule by 

mouth 

every 6 

(six) 

hours for 

7 days.                                                     [facility]

 

                                                    Nitrofurant

oin&Nit. 

Macrocryst 

(MACROBID) 

100 mg 

capsule                                             2021 

00:00:

00                                       

05:59

:00        No                    [id number]   100mg                 Take 1 

capsule by 

mouth 2 

(two) 

times 

daily for 

7 days.                                                     [facility]

 

                                                    aspirin 

(ASPIRIN 

LOW DOSE) 

81 mg EC 

tablet                                               

13:43:

13                  Yes                           81mg                Take 81 mg

 

by mouth 

daily.                                                      [facility]

 

                                                    lisinopril-

hydrochloro

thiazide 

20-12.5 mg 

per tablet                                           

13:43:

13                  Yes                 [id number]  1{tbl}              Take 1 

tablet by 

mouth 

every 

morning.                                                    [facility]

 

                                                    buPROPion 

SR 

(WELLBUTRIN 

SR) 150 mg 

SR tablet                                           16 

00:00:

00                  Yes                 [id number]                      Start 1 

tab PO 

daily x 3 

days, then 

increase 

to 1 tab 

PO BID.                                                     [facility]

[facility]

 

                                                    amoxicillin 

875 

mg-potassiu

m 

clavulanate 

125 mg 

tablet TAKE 

1 TABLET BY 

MOUTH TWICE 

DAILY                                   amoxicillin 

875 

mg-potassiu

m 

clavulanate 

125 mg 

tablet TAKE 

1 TABLET BY 

MOUTH TWICE 

DAILY                   No                                      amoxicilli

n 875 

mg-potassi

um 

clavulanat

e 125 mg 

tablet 

TAKE 1 

TABLET BY 

MOUTH 

TWICE 

DAILY                                                       [facility]

 

                                                    ciprofloxac

in 500 mg 

tablet TAKE 

1 TABLET BY 

MOUTH TWICE 

DAILY                                   ciprofloxac

in 500 mg 

tablet TAKE 

1 TABLET BY 

MOUTH TWICE 

DAILY                   No                                      ciprofloxa

cordell 500 mg 

tablet 

TAKE 1 

TABLET BY 

MOUTH 

TWICE 

DAILY                                                       [facility]

 

                                                    Cortisporin

-TC 3.3 

mg-3 mg-10 

mg-0.5 

mg/mL ear 

drops,suspe

nsion 

INSTILL 1 

DROP INTO 

AFFECTED 

EAR(S) 

EVERY 4 

HOURS                                   Cortisporin

-TC 3.3 

mg-3 mg-10 

mg-0.5 

mg/mL ear 

drops,suspe

nsion 

INSTILL 1 

DROP INTO 

AFFECTED 

EAR(S) 

EVERY 4 

HOURS                   No                                      Cortispori

n-TC 3.3 

mg-3 mg-10 

mg-0.5 

mg/mL ear 

drops,susp

ension 

INSTILL 1 

DROP INTO 

AFFECTED 

EAR(S) 

EVERY 4 

HOURS                                                       [facility]

 

                                                    lisinopril 

10 

mg-hydrochl

orothiazide 

12.5 mg 

tablet TAKE 

1 TABLET BY 

MOUTH ONCE 

DAILY FOR 

BLOOD 

PRESSURE                                lisinopril 

10 

mg-hydrochl

orothiazide 

12.5 mg 

tablet TAKE 

1 TABLET BY 

MOUTH ONCE 

DAILY FOR 

BLOOD 

PRESSURE                 No                                      lisinopril 

10 

mg-hydroch

lorothiazi

de 12.5 mg 

tablet 

TAKE 1 

TABLET BY 

MOUTH ONCE 

DAILY FOR 

BLOOD 

PRESSURE                                                    [facility]

 

                                                    metformin 

500 mg 

tablet Take 

1 tablet 

twice a day 

by oral 

route.                                  metformin 

500 mg 

tablet Take 

1 tablet 

twice a day 

by oral 

route.                  No                      1       BID     metformin 

500 mg 

tablet 

Take 1 

tablet 

twice a 

day by 

oral 

route.                                                      [facility]

 

                                                    methylpredn

isolone 4 

mg tablets 

in a dose 

pack TAKE 

AS DIRECTED                             methylpredn

isolone 4 

mg tablets 

in a dose 

pack TAKE 

AS DIRECTED                 No                                      methylpred

nisolone 4 

mg tablets 

in a dose 

pack TAKE 

AS 

DIRECTED                                                    [facility]

 

                                                    phenazopyri

dine 200 mg 

tablet                                  phenazopyri

dine 200 mg 

tablet                  No                                      phenazopyr

idine 200 

mg tablet                                                   [facility]

 

                                                    Prescriptio

n - Prior 

Authorizati

on Request                              Prescriptio

n - Prior 

Authorizati

on Request                 No                                      Prescripti

on - Prior 

Authorizat

ion 

Request                                                     [facility] 

[facility]

 

                                                    progesteron

e 

micronized 

200 mg 

capsule 

Take 1 

capsule 

twice a day 

by oral 

route.                                  progesteron

e 

micronized 

200 mg 

capsule 

Take 1 

capsule 

twice a day 

by oral 

route.                           No                               1capsul

e(s)                      BID                       progestero

ne 

micronized 

200 mg 

capsule 

Take 1 

capsule 

twice a 

day by 

oral 

route.                                                      [facility]

 

                                                    ciprofloxac

in 500 mg 

tablet TAKE 

1 TABLET BY 

MOUTH TWICE 

DAILY                                   ciprofloxac

in 500 mg 

tablet TAKE 

1 TABLET BY 

MOUTH TWICE 

DAILY                   No                                      ciprofloxa

cordell 500 mg 

tablet 

TAKE 1 

TABLET BY 

MOUTH 

TWICE 

DAILY                                                       [facility]

 

                                                    Diflucan 

200 mg 

tablet Take 

1 tablet 

every day 

by oral 

route for 3 

days.                                   Diflucan 

200 mg 

tablet Take 

1 tablet 

every day 

by oral 

route for 3 

days.                   No                      1       Q1D     Diflucan 

200 mg 

tablet 

Take 1 

tablet 

every day 

by oral 

route for 

3 days.                                                     [facility]

 

                                                    enoxaparin 

40 mg/0.4 

mL 

subcutaneou

s syringe 

INJECT 40MG 

SUBCUTANEOU

S DAILY                                 enoxaparin 

40 mg/0.4 

mL 

subcutaneou

s syringe 

INJECT 40MG 

SUBCUTANEOU

S DAILY                 No                                      enoxaparin 

40 mg/0.4 

mL 

subcutaneo

us syringe 

INJECT 

40MG 

SUBCUTANEO

US DAILY                                                    [facility]

 

                                                    metformin 

500 mg 

tablet Take 

1 tablet 

twice a day 

by oral 

route.                                  metformin 

500 mg 

tablet Take 

1 tablet 

twice a day 

by oral 

route.                  No                      1       BID     metformin 

500 mg 

tablet 

Take 1 

tablet 

twice a 

day by 

oral 

route.                                                      [facility] 

[facility]

 

                                                    progesteron

e 

micronized 

200 mg 

capsule 

Take 1 

capsule 

twice a day 

by oral 

route.                                  progesteron

e 

micronized 

200 mg 

capsule 

Take 1 

capsule 

twice a day 

by oral 

route.                           No                               1capsul

e(s)                      BID                       progestero

ne 

micronized 

200 mg 

capsule 

Take 1 

capsule 

twice a 

day by 

oral 

route.                                                      [facility]

 

                                                    lisinopril 

10 

mg-hydrochl

orothiazide 

12.5 mg 

tablet Take 

1 tablet 

every day 

by oral 

route.                                  lisinopril 

10 

mg-hydrochl

orothiazide 

12.5 mg 

tablet Take 

1 tablet 

every day 

by oral 

route.                  No                      1       Q1D     lisinopril 

10 

mg-hydroch

lorothiazi

de 12.5 mg 

tablet 

Take 1 

tablet 

every day 

by oral 

route.                                                      [facility] 

[facility]

 

                                                    metformin 

500 mg 

tablet Take 

1 tablet 

twice a day 

by oral 

route.                                  metformin 

500 mg 

tablet Take 

1 tablet 

twice a day 

by oral 

route.                  No                      1       BID     metformin 

500 mg 

tablet 

Take 1 

tablet 

twice a 

day by 

oral 

route.                                                      [facility] 

[facility]

 

                                                    enoxaparin 

40 mg/0.4 

mL 

subcutaneou

s syringe 

INJECT 40MG 

SUBCUTANEOU

S DAILY                                 enoxaparin 

40 mg/0.4 

mL 

subcutaneou

s syringe 

INJECT 40MG 

SUBCUTANEOU

S DAILY                 No                                      enoxaparin 

40 mg/0.4 

mL 

subcutaneo

us syringe 

INJECT 

40MG 

SUBCUTANEO

US DAILY                                                    [facility] 

[facility]

 

                                                    omeprazole 

40 mg 

capsule,del

ayed 

release 

Take 1 

capsule 

every day 

by oral 

route.                                  omeprazole 

40 mg 

capsule,del

ayed 

release 

Take 1 

capsule 

every day 

by oral 

route.                           No                               1capsul

e(s)                      Q1D                       omeprazole 

40 mg 

capsule,de

layed 

release 

Take 1 

capsule 

every day 

by oral 

route.                                                      [facility]

 

                                                    Wellbutrin 

 mg 

tablet, 12 

hr 

sustained-r

elease Take 

1 tablet 

twice a day 

by oral 

route.                                  Wellbutrin 

 mg 

tablet, 12 

hr 

sustained-r

elease Take 

1 tablet 

twice a day 

by oral 

route.                  No                      1       BID     Wellbutrin 

 mg 

tablet, 12 

hr 

sustained-

release 

Take 1 

tablet 

twice a 

day by 

oral 

route.                                                      [facility] 

[facility]

 

                                                    progesteron

e 

micronized 

200 mg 

capsule 

Take 1 

capsule 

twice a day 

by oral 

route.                                  progesteron

e 

micronized 

200 mg 

capsule 

Take 1 

capsule 

twice a day 

by oral 

route.                  No                                      progestero

ne 

micronized 

200 mg 

capsule 

Take 1 

capsule 

twice a 

day by 

oral 

route.                                                      [facility] 

[facility]

 

                                                    Reglan 10 

mg tablet 

Take 1 

tablet 4 

times a day 

by oral 

route as 

directed 

for 30 

days.                                   Reglan 10 

mg tablet 

Take 1 

tablet 4 

times a day 

by oral 

route as 

directed 

for 30 

days.                   No                      1       QID     Reglan 10 

mg tablet 

Take 1 

tablet 4 

times a 

day by 

oral route 

as 

directed 

for 30 

days.                                                       [facility] 

[facility]

 

                                                    enoxaparin 

40 mg/0.4 

mL 

subcutaneou

s syringe 

INJECT 40MG 

SUBCUTANEOU

S DAILY                                 enoxaparin 

40 mg/0.4 

mL 

subcutaneou

s syringe 

INJECT 40MG 

SUBCUTANEOU

S DAILY                 No                                      enoxaparin 

40 mg/0.4 

mL 

subcutaneo

us syringe 

INJECT 

40MG 

SUBCUTANEO

US DAILY                                                    [facility] 

[facility] 

[facility]

 

                                                    metoclopram

rolanda 10 mg 

tablet TAKE 

1 TABLET BY 

MOUTH FOUR 

TIMES DAILY 

AS DIRECTED                             metoclopram

rolanda 10 mg 

tablet TAKE 

1 TABLET BY 

MOUTH FOUR 

TIMES DAILY 

AS DIRECTED                 No                                      metoclopra

mide 10 mg 

tablet 

TAKE 1 

TABLET BY 

MOUTH FOUR 

TIMES 

DAILY AS 

DIRECTED                                                    [facility] 

[facility] 

[facility]

 

                                                    omeprazole 

40 mg 

capsule,del

ayed 

release 

TAKE 1 

CAPSULE BY 

MOUTH EVERY 

DAY                                     omeprazole 

40 mg 

capsule,del

ayed 

release 

TAKE 1 

CAPSULE BY 

MOUTH EVERY 

DAY                     No                                      omeprazole 

40 mg 

capsule,de

layed 

release 

TAKE 1 

CAPSULE BY 

MOUTH 

EVERY DAY                                                   [facility] 

[facility]

 

                                                    progesteron

e 

micronized 

200 mg 

capsule 

TAKE 1 

CAPSULE BY 

MOUTH TWICE 

DAILY                                   progesteron

e 

micronized 

200 mg 

capsule 

TAKE 1 

CAPSULE BY 

MOUTH TWICE 

DAILY                   No                                      progestero

ne 

micronized 

200 mg 

capsule 

TAKE 1 

CAPSULE BY 

MOUTH 

TWICE 

DAILY                                                       [facility] 

[facility]

 

                                                    bupropion 

HCl  

mg 24 hr 

tablet, 

extended 

release 

TAKE 1 

TABLET BY 

MOUTH EVERY 

DAY                                     bupropion 

HCl  

mg 24 hr 

tablet, 

extended 

release 

TAKE 1 

TABLET BY 

MOUTH EVERY 

DAY                     No                                      bupropion 

HCl  

mg 24 hr 

tablet, 

extended 

release 

TAKE 1 

TABLET BY 

MOUTH 

EVERY DAY                                                   [facility] 

[facility] 

[facility]

 

                                                    enoxaparin 

40 mg/0.4 

mL 

subcutaneou

s syringe 

INJECT 40MG 

SUBCUTANEOU

S DAILY                                 enoxaparin 

40 mg/0.4 

mL 

subcutaneou

s syringe 

INJECT 40MG 

SUBCUTANEOU

S DAILY                 No                                      enoxaparin 

40 mg/0.4 

mL 

subcutaneo

us syringe 

INJECT 

40MG 

SUBCUTANEO

US DAILY                                                    [facility] 

[facility] 

[facility]

 

                                                    ferrous 

gluconate 

240 mg (27 

mg iron) 

tablet Take 

1 tablet 

every day 

by oral 

route.                                  ferrous 

gluconate 

240 mg (27 

mg iron) 

tablet Take 

1 tablet 

every day 

by oral 

route.                  No                      1       Q1D     ferrous 

gluconate 

240 mg (27 

mg iron) 

tablet 

Take 1 

tablet 

every day 

by oral 

route.                                                      [facility] 

[facility] 

[facility]

 

                                                    metoclopram

rolanda 10 mg 

tablet TAKE 

1 TABLET BY 

MOUTH FOUR 

TIMES DAILY 

AS DIRECTED                             metoclopram

rolanda 10 mg 

tablet TAKE 

1 TABLET BY 

MOUTH FOUR 

TIMES DAILY 

AS DIRECTED                 No                                      metoclopra

mide 10 mg 

tablet 

TAKE 1 

TABLET BY 

MOUTH FOUR 

TIMES 

DAILY AS 

DIRECTED                                                    [facility] 

[facility] 

[facility]

 

                                                    omeprazole 

40 mg 

capsule,del

ayed 

release 

TAKE 1 

CAPSULE BY 

MOUTH EVERY 

DAY                                     omeprazole 

40 mg 

capsule,del

ayed 

release 

TAKE 1 

CAPSULE BY 

MOUTH EVERY 

DAY                     No                                      omeprazole 

40 mg 

capsule,de

layed 

release 

TAKE 1 

CAPSULE BY 

MOUTH 

EVERY DAY                                                   [facility] 

[facility] 

[facility]

 

                                                    progesteron

e 

micronized 

200 mg 

capsule 

TAKE 1 

CAPSULE BY 

MOUTH TWICE 

DAILY                                   progesteron

e 

micronized 

200 mg 

capsule 

TAKE 1 

CAPSULE BY 

MOUTH TWICE 

DAILY                   No                                      progestero

ne 

micronized 

200 mg 

capsule 

TAKE 1 

CAPSULE BY 

MOUTH 

TWICE 

DAILY                                                       [facility] 

[facility] 

[facility]

 

                                                    bupropion 

HCl  

mg 24 hr 

tablet, 

extended 

release 

TAKE 1 

TABLET BY 

MOUTH EVERY 

DAY                                     bupropion 

HCl  

mg 24 hr 

tablet, 

extended 

release 

TAKE 1 

TABLET BY 

MOUTH EVERY 

DAY                     No                                      bupropion 

HCl  

mg 24 hr 

tablet, 

extended 

release 

TAKE 1 

TABLET BY 

MOUTH 

EVERY DAY                                                   [facility] 

[facility] 

[facility]

 

                                                    enoxaparin 

40 mg/0.4 

mL 

subcutaneou

s syringe 

INJECT 40MG 

SUBCUTANEOU

S DAILY                                 enoxaparin 

40 mg/0.4 

mL 

subcutaneou

s syringe 

INJECT 40MG 

SUBCUTANEOU

S DAILY                 No                                      enoxaparin 

40 mg/0.4 

mL 

subcutaneo

us syringe 

INJECT 

40MG 

SUBCUTANEO

US DAILY                                                    [facility] 

[facility] 

[facility]

 

                                                    ferrous 

gluconate 

240 mg (27 

mg iron) 

tablet Take 

1 tablet 

every day 

by oral 

route.                                  ferrous 

gluconate 

240 mg (27 

mg iron) 

tablet Take 

1 tablet 

every day 

by oral 

route.                  No                      1       Q1D     ferrous 

gluconate 

240 mg (27 

mg iron) 

tablet 

Take 1 

tablet 

every day 

by oral 

route.                                                      [facility] 

[facility] 

[facility]

 

                                                    metoclopram

rolanda 10 mg 

tablet TAKE 

1 TABLET BY 

MOUTH FOUR 

TIMES DAILY 

AS DIRECTED                             metoclopram

rolanda 10 mg 

tablet TAKE 

1 TABLET BY 

MOUTH FOUR 

TIMES DAILY 

AS DIRECTED                 No                                      metoclopra

mide 10 mg 

tablet 

TAKE 1 

TABLET BY 

MOUTH FOUR 

TIMES 

DAILY AS 

DIRECTED                                                    [facility] 

[facility] 

[facility]

 

                                                    omeprazole 

40 mg 

capsule,del

ayed 

release 

TAKE 1 

CAPSULE BY 

MOUTH EVERY 

DAY                                     omeprazole 

40 mg 

capsule,del

ayed 

release 

TAKE 1 

CAPSULE BY 

MOUTH EVERY 

DAY                     No                                      omeprazole 

40 mg 

capsule,de

layed 

release 

TAKE 1 

CAPSULE BY 

MOUTH 

EVERY DAY                                                   [facility] 

[facility] 

[facility]

 

                                                    [employer] 

Delica Plus 

Lancet 33 

gauge TEST 

FOUR TIMES 

DAILY                                   [employer] 

Delica Plus 

Lancet 33 

gauge TEST 

FOUR TIMES 

DAILY                   No                                      [employer] 

Delica 

Plus 

Lancet 33 

gauge TEST 

FOUR TIMES 

DAILY                                                       [facility] 

[facility]

 

                                                    OneTouch 

Ultra Test 

strips TEST 

FOUR TIMES 

DAILY                                   [employer] 

Ultra Test 

strips TEST 

FOUR TIMES 

DAILY                   No                                      [employer] 

Ultra Test 

strips 

TEST FOUR 

TIMES 

DAILY                                                       [facility]

 

                                                    OneTouch 

Ultra2 

Meter TEST 

FOUR TIMES 

DAILY                                   [employer] 

Ultra2 

Meter TEST 

FOUR TIMES 

DAILY                   No                                      [employer] 

Ultra2 

Meter TEST 

FOUR TIMES 

DAILY                                                       [facility]

 

                                                    progesteron

e 

micronized 

200 mg 

capsule 

TAKE 1 

CAPSULE BY 

MOUTH TWICE 

DAILY                                   progesteron

e 

micronized 

200 mg 

capsule 

TAKE 1 

CAPSULE BY 

MOUTH TWICE 

DAILY                   No                                      progestero

ne 

micronized 

200 mg 

capsule 

TAKE 1 

CAPSULE BY 

MOUTH 

TWICE 

DAILY                                                       [facility]

 

                                                    bupropion 

HCl  

mg 24 hr 

tablet, 

extended 

release 

TAKE 1 

TABLET BY 

MOUTH EVERY 

DAY                                     bupropion 

HCl  

mg 24 hr 

tablet, 

extended 

release 

TAKE 1 

TABLET BY 

MOUTH EVERY 

DAY                     No                                      bupropion 

HCl  

mg 24 hr 

tablet, 

extended 

release 

TAKE 1 

TABLET BY 

MOUTH 

EVERY DAY                                                   [facility]

 

                                                    enoxaparin 

40 mg/0.4 

mL 

subcutaneou

s syringe 

INJECT 40MG 

SUBCUTANEOU

S DAILY                                 enoxaparin 

40 mg/0.4 

mL 

subcutaneou

s syringe 

INJECT 40MG 

SUBCUTANEOU

S DAILY                 No                                      enoxaparin 

40 mg/0.4 

mL 

subcutaneo

us syringe 

INJECT 

40MG 

SUBCUTANEO

US DAILY                                                    [facility] 

[facility]

 

                                                    ferrous 

gluconate 

240 mg (27 

mg iron) 

tablet Take 

1 tablet 

every day 

by oral 

route.                                  ferrous 

gluconate 

240 mg (27 

mg iron) 

tablet Take 

1 tablet 

every day 

by oral 

route.                  No                      1       Q1D     ferrous 

gluconate 

240 mg (27 

mg iron) 

tablet 

Take 1 

tablet 

every day 

by oral 

route.                                                      [facility]

 

                                                    omeprazole 

40 mg 

capsule,del

ayed 

release 

TAKE 1 

CAPSULE BY 

MOUTH EVERY 

DAY                                     omeprazole 

40 mg 

capsule,del

ayed 

release 

TAKE 1 

CAPSULE BY 

MOUTH EVERY 

DAY                     No                                      omeprazole 

40 mg 

capsule,de

layed 

release 

TAKE 1 

CAPSULE BY 

MOUTH 

EVERY DAY                                                   [facility] 

[facility]

 

                                                    [employer] 

Delica Plus 

Lancet 33 

gauge TEST 

FOUR TIMES 

DAILY                                   [employer] 

Delica Plus 

Lancet 33 

gauge TEST 

FOUR TIMES 

DAILY                   No                                      [employer] 

Delica 

Plus 

Lancet 33 

gauge TEST 

FOUR TIMES 

DAILY                                                       [facility]

 

                                                    One[employer] 

Ultra Test 

strips TEST 

FOUR TIMES 

DAILY                                   [employer] 

Ultra Test 

strips TEST 

FOUR TIMES 

DAILY                   No                                      [employer] 

Ultra Test 

strips 

TEST FOUR 

TIMES 

DAILY                                                       [facility] 

[facility]

 

                                                    One[employer] 

Ultra2 

Meter TEST 

FOUR TIMES 

DAILY                                   [employer] 

Ultra2 

Meter TEST 

FOUR TIMES 

DAILY                   No                                      [employer] 

Ultra2 

Meter TEST 

FOUR TIMES 

DAILY                                                       [facility] 

[facility] 

[facility]

 

                                                    RhoGAM 

Ultra-Filte

red PLUS 

1,500 unit 

(300 mcg) 

intramuscul

ar syringe 

Inject 1 

syringe by 

intramuscul

ar route.                               RhoGAM 

Ultra-Filte

red PLUS 

1,500 unit 

(300 mcg) 

intramuscul

ar syringe 

Inject 1 

syringe by 

intramuscul

ar route.                        No                               1syring

e(s)                                                RhoGAM 

Ultra-Filt

ered PLUS 

1,500 unit 

(300 mcg) 

intramuscu

lar 

syringe 

Inject 1 

syringe by 

intramuscu

lar route.                                                  [facility]

 

                                                    bupropion 

HCl  

mg 24 hr 

tablet, 

extended 

release 

TAKE 1 

TABLET BY 

MOUTH EVERY 

DAY                                     bupropion 

HCl  

mg 24 hr 

tablet, 

extended 

release 

TAKE 1 

TABLET BY 

MOUTH EVERY 

DAY                     No                                      bupropion 

HCl  

mg 24 hr 

tablet, 

extended 

release 

TAKE 1 

TABLET BY 

MOUTH 

EVERY DAY                                                   [facility] 

[facility]

 

                                                    enoxaparin 

40 mg/0.4 

mL 

subcutaneou

s syringe 

INJECT 40MG 

SUBCUTANEOU

S DAILY                                 enoxaparin 

40 mg/0.4 

mL 

subcutaneou

s syringe 

INJECT 40MG 

SUBCUTANEOU

S DAILY                 No                                      enoxaparin 

40 mg/0.4 

mL 

subcutaneo

us syringe 

INJECT 

40MG 

SUBCUTANEO

US DAILY                                                    [facility] 

[facility]

 

                                                    ferrous 

gluconate 

240 mg (27 

mg iron) 

tablet Take 

1 tablet 

every day 

by oral 

route.                                  ferrous 

gluconate 

240 mg (27 

mg iron) 

tablet Take 

1 tablet 

every day 

by oral 

route.                  No                      1       Q1D     ferrous 

gluconate 

240 mg (27 

mg iron) 

tablet 

Take 1 

tablet 

every day 

by oral 

route.                                                      [facility] 

[facility]

 

                                                    nifedipine 

ER 30 mg 

tablet,exte

nded 

release 24 

hr Take 1 

tablet 

every day 

by oral 

route.                                  nifedipine 

ER 30 mg 

tablet,exte

nded 

release 24 

hr Take 1 

tablet 

every day 

by oral 

route.                  No                      1       Q1D     nifedipine 

ER 30 mg 

tablet,ext

ended 

release 24 

hr Take 1 

tablet 

every day 

by oral 

route.                                                      [facility] 

[facility]

 

                                                    omeprazole 

40 mg 

capsule,del

ayed 

release 

TAKE 1 

CAPSULE BY 

MOUTH EVERY 

DAY                                     omeprazole 

40 mg 

capsule,del

ayed 

release 

TAKE 1 

CAPSULE BY 

MOUTH EVERY 

DAY                     No                                      omeprazole 

40 mg 

capsule,de

layed 

release 

TAKE 1 

CAPSULE BY 

MOUTH 

EVERY DAY                                                   [facility]

[facility] 

[facility] 

[facility]

 

                                                    OneTouch 

Delica Plus 

Lancet 33 

gauge TEST 

FOUR TIMES 

DAILY                                   One[employer] 

Delica Plus 

Lancet 33 

gauge TEST 

FOUR TIMES 

DAILY                   No                                      One[employer] 

Delica 

Plus 

Lancet 33 

gauge TEST 

FOUR TIMES 

DAILY                                                       [facility]

da 

[facility] 

[facility]

 

                                                    OneTouch 

Ultra Test 

strips TEST 

FOUR TIMES 

DAILY                                   OneTouch 

Ultra Test 

strips TEST 

FOUR TIMES 

DAILY                   No                                      OneTouch 

Ultra Test 

strips 

TEST FOUR 

TIMES 

DAILY                                                       [facility] 

[facility] 

[facility]

 

                                                    OneTouch 

Ultra2 

Meter TEST 

FOUR TIMES 

DAILY                                   OneTouch 

Ultra2 

Meter TEST 

FOUR TIMES 

DAILY                   No                                      OneTouch 

Ultra2 

Meter TEST 

FOUR TIMES 

DAILY                                                       [facility] 

[facility] 

[facility]

 

                                                    RhoGAM 

Ultra-Filte

red PLUS 

1,500 unit 

(300 mcg) 

intramuscul

ar syringe 

Inject 1 

syringe by 

intramuscul

ar route.                               RhoGAM 

Ultra-Filte

red PLUS 

1,500 unit 

(300 mcg) 

intramuscul

ar syringe 

Inject 1 

syringe by 

intramuscul

ar route.                        No                               1syring

e(s)                                                RhoGAM 

Ultra-Filt

ered PLUS 

1,500 unit 

(300 mcg) 

intramuscu

lar 

syringe 

Inject 1 

syringe by 

intramuscu

lar route.                                                  [facility] 

[facility] 

[facility]

 

                                                    bupropion 

HCl  

mg 24 hr 

tablet, 

extended 

release 

TAKE 1 

TABLET BY 

MOUTH EVERY 

DAY                                     bupropion 

HCl  

mg 24 hr 

tablet, 

extended 

release 

TAKE 1 

TABLET BY 

MOUTH EVERY 

DAY                     No                                      bupropion 

HCl  

mg 24 hr 

tablet, 

extended 

release 

TAKE 1 

TABLET BY 

MOUTH 

EVERY DAY                                                   [facility]

 

[facility] 

[facility]

 

                                                    enoxaparin 

40 mg/0.4 

mL 

subcutaneou

s syringe 

INJECT 40MG 

SUBCUTANEOU

S DAILY                                 enoxaparin 

40 mg/0.4 

mL 

subcutaneou

s syringe 

INJECT 40MG 

SUBCUTANEOU

S DAILY                 No                                      enoxaparin 

40 mg/0.4 

mL 

subcutaneo

us syringe 

INJECT 

40MG 

SUBCUTANEO

US DAILY                                                    [facility]

 

[facility] 

[facility]

 

                                                    ferrous 

gluconate 

240 mg (27 

mg iron) 

tablet Take 

1 tablet 

every day 

by oral 

route.                                  ferrous 

gluconate 

240 mg (27 

mg iron) 

tablet Take 

1 tablet 

every day 

by oral 

route.                  No                      1       Q1D     ferrous 

gluconate 

240 mg (27 

mg iron) 

tablet 

Take 1 

tablet 

every day 

by oral 

route.                                                      [facility] 

[facility] 

[facility]

 

                                                    nifedipine 

ER 30 mg 

tablet,exte

nded 

release 24 

hr TAKE 1 

TABLET BY 

MOUTH EVERY 

DAY                                     nifedipine 

ER 30 mg 

tablet,exte

nded 

release 24 

hr TAKE 1 

TABLET BY 

MOUTH EVERY 

DAY                     No                                      nifedipine 

ER 30 mg 

tablet,ext

ended 

release 24 

hr TAKE 1 

TABLET BY 

MOUTH 

EVERY DAY                                                   [facility] 

[facility]

 

                                                    omeprazole 

40 mg 

capsule,del

ayed 

release 

TAKE 1 

CAPSULE BY 

MOUTH EVERY 

DAY                                     omeprazole 

40 mg 

capsule,del

ayed 

release 

TAKE 1 

CAPSULE BY 

MOUTH EVERY 

DAY                     No                                      omeprazole 

40 mg 

capsule,de

layed 

release 

TAKE 1 

CAPSULE BY 

MOUTH 

EVERY DAY                                                   [facility] 

[facility]

 

                                                    OneTouch 

Delica Plus 

Lancet 33 

gauge TEST 

FOUR TIMES 

DAILY                                   [employer] 

Delica Plus 

Lancet 33 

gauge TEST 

FOUR TIMES 

DAILY                   No                                      One[employer] 

Delica 

Plus 

Lancet 33 

gauge TEST 

FOUR TIMES 

DAILY                                                       [facility]

 

                                                    OneTouch 

Ultra Test 

strips TEST 

FOUR TIMES 

DAILY                                   OneTouch 

Ultra Test 

strips TEST 

FOUR TIMES 

DAILY                   No                                      OneTouch 

Ultra Test 

strips 

TEST FOUR 

TIMES 

DAILY                                                       [facility]

 

                                                    OneTouch 

Ultra2 

Meter TEST 

FOUR TIMES 

DAILY                                   OneTouch 

Ultra2 

Meter TEST 

FOUR TIMES 

DAILY                   No                                      OneTouch 

Ultra2 

Meter TEST 

FOUR TIMES 

DAILY                                                       [facility]

 

                                                    RhoGAM 

Ultra-Filte

red PLUS 

1,500 unit 

(300 mcg) 

intramuscul

ar syringe 

Inject 1 

syringe by 

intramuscul

ar route.                               RhoGAM 

Ultra-Filte

red PLUS 

1,500 unit 

(300 mcg) 

intramuscul

ar syringe 

Inject 1 

syringe by 

intramuscul

ar route.                        No                               1syring

e(s)                                                RhoGAM 

Ultra-Filt

ered PLUS 

1,500 unit 

(300 mcg) 

intramuscu

lar 

syringe 

Inject 1 

syringe by 

intramuscu

lar route.                                                  [facility]

 

                                                    bupropion 

HCl  

mg 24 hr 

tablet, 

extended 

release 

TAKE 1 

TABLET BY 

MOUTH EVERY 

DAY                                     bupropion 

HCl  

mg 24 hr 

tablet, 

extended 

release 

TAKE 1 

TABLET BY 

MOUTH EVERY 

DAY                     No                                      bupropion 

HCl  

mg 24 hr 

tablet, 

extended 

release 

TAKE 1 

TABLET BY 

MOUTH 

EVERY DAY                                                   [facility]

 

                                                    enoxaparin 

40 mg/0.4 

mL 

subcutaneou

s syringe 

INJECT 40MG 

SUBCUTANEOU

S DAILY                                 enoxaparin 

40 mg/0.4 

mL 

subcutaneou

s syringe 

INJECT 40MG 

SUBCUTANEOU

S DAILY                 No                                      enoxaparin 

40 mg/0.4 

mL 

subcutaneo

us syringe 

INJECT 

40MG 

SUBCUTANEO

US DAILY                                                    [facility]

 

                                                    ferrous 

gluconate 

240 mg (27 

mg iron) 

tablet Take 

1 tablet 

every day 

by oral 

route.                                  ferrous 

gluconate 

240 mg (27 

mg iron) 

tablet Take 

1 tablet 

every day 

by oral 

route.                  No                      1       Q1D     ferrous 

gluconate 

240 mg (27 

mg iron) 

tablet 

Take 1 

tablet 

every day 

by oral 

route.                                                      [facility]

 

                                                    metoclopram

rolanda 10 mg 

tablet TAKE 

1 TABLET BY 

MOUTH FOUR 

TIMES DAILY 

AS DIRECTED                             metoclopram

rolanda 10 mg 

tablet TAKE 

1 TABLET BY 

MOUTH FOUR 

TIMES DAILY 

AS DIRECTED                 No                                      metoclopra

mide 10 mg 

tablet 

TAKE 1 

TABLET BY 

MOUTH FOUR 

TIMES 

DAILY AS 

DIRECTED                                                    [facility]

 

                                                    nifedipine 

ER 30 mg 

tablet,exte

nded 

release 24 

hr TAKE 1 

TABLET BY 

MOUTH EVERY 

DAY                                     nifedipine 

ER 30 mg 

tablet,exte

nded 

release 24 

hr TAKE 1 

TABLET BY 

MOUTH EVERY 

DAY                     No                                      nifedipine 

ER 30 mg 

tablet,ext

ended 

release 24 

hr TAKE 1 

TABLET BY 

MOUTH 

EVERY DAY                                                   [facility]

 

                                                    omeprazole 

40 mg 

capsule,del

ayed 

release 

TAKE 1 

CAPSULE BY 

MOUTH EVERY 

DAY                                     omeprazole 

40 mg 

capsule,del

ayed 

release 

TAKE 1 

CAPSULE BY 

MOUTH EVERY 

DAY                     No                                      omeprazole 

40 mg 

capsule,de

layed 

release 

TAKE 1 

CAPSULE BY 

MOUTH 

EVERY DAY                                                   [facility]

 

                                                    OneTouch 

Delica Plus 

Lancet 33 

gauge TEST 

FOUR TIMES 

DAILY                                   [employer] 

Delica Plus 

Lancet 33 

gauge TEST 

FOUR TIMES 

DAILY                   No                                      One[employer] 

Delica 

Plus 

Lancet 33 

gauge TEST 

FOUR TIMES 

DAILY                                                       [facility]

 

                                                    OneTouch 

Ultra Test 

strips TEST 

FOUR TIMES 

DAILY                                   OneTouch 

Ultra Test 

strips TEST 

FOUR TIMES 

DAILY                   No                                      OneTouch 

Ultra Test 

strips 

TEST FOUR 

TIMES 

DAILY                                                       [facility]

 

                                                    OneTouch 

Ultra2 

Meter TEST 

FOUR TIMES 

DAILY                                   One[employer] 

Ultra2 

Meter TEST 

FOUR TIMES 

DAILY                   No                                      OneTouch 

Ultra2 

Meter TEST 

FOUR TIMES 

DAILY                                                       [facility]

 

                                                    RhoGAM 

Ultra-Filte

red PLUS 

1,500 unit 

(300 mcg) 

intramuscul

ar syringe 

Inject 1 

syringe by 

intramuscul

ar route.                               RhoGAM 

Ultra-Filte

red PLUS 

1,500 unit 

(300 mcg) 

intramuscul

ar syringe 

Inject 1 

syringe by 

intramuscul

ar route.                        No                               1syring

e(s)                                                RhoGAM 

Ultra-Filt

ered PLUS 

1,500 unit 

(300 mcg) 

intramuscu

lar 

syringe 

Inject 1 

syringe by 

intramuscu

lar route.                                                  [facility]

 

                                                    bupropion 

HCl  

mg 24 hr 

tablet, 

extended 

release 

TAKE 1 

TABLET BY 

MOUTH EVERY 

DAY                                     bupropion 

HCl  

mg 24 hr 

tablet, 

extended 

release 

TAKE 1 

TABLET BY 

MOUTH EVERY 

DAY                     No                                      bupropion 

HCl  

mg 24 hr 

tablet, 

extended 

release 

TAKE 1 

TABLET BY 

MOUTH 

EVERY DAY                                                   [facility]

 

                                                    enoxaparin 

40 mg/0.4 

mL 

subcutaneou

s syringe 

INJECT 40MG 

SUBCUTANEOU

S DAILY                                 enoxaparin 

40 mg/0.4 

mL 

subcutaneou

s syringe 

INJECT 40MG 

SUBCUTANEOU

S DAILY                 No                                      enoxaparin 

40 mg/0.4 

mL 

subcutaneo

us syringe 

INJECT 

40MG 

SUBCUTANEO

US DAILY                                                    [facility]

 

                                                    ferrous 

gluconate 

240 mg (27 

mg iron) 

tablet Take 

1 tablet 

every day 

by oral 

route.                                  ferrous 

gluconate 

240 mg (27 

mg iron) 

tablet Take 

1 tablet 

every day 

by oral 

route.                  No                      1       Q1D     ferrous 

gluconate 

240 mg (27 

mg iron) 

tablet 

Take 1 

tablet 

every day 

by oral 

route.                                                      [facility]

 

                                                    metoclopram

rolanda 10 mg 

tablet TAKE 

1 TABLET BY 

MOUTH FOUR 

TIMES DAILY 

AS DIRECTED                             metoclopram

rolanda 10 mg 

tablet TAKE 

1 TABLET BY 

MOUTH FOUR 

TIMES DAILY 

AS DIRECTED                 No                                      metoclopra

mide 10 mg 

tablet 

TAKE 1 

TABLET BY 

MOUTH FOUR 

TIMES 

DAILY AS 

DIRECTED                                                    [facility]

 

                                                    nifedipine 

ER 30 mg 

tablet,exte

nded 

release 24 

hr TAKE 1 

TABLET BY 

MOUTH EVERY 

DAY                                     nifedipine 

ER 30 mg 

tablet,exte

nded 

release 24 

hr TAKE 1 

TABLET BY 

MOUTH EVERY 

DAY                     No                                      nifedipine 

ER 30 mg 

tablet,ext

ended 

release 24 

hr TAKE 1 

TABLET BY 

MOUTH 

EVERY DAY                                                   [facility]

 

                                                    omeprazole 

40 mg 

capsule,del

ayed 

release 

TAKE 1 

CAPSULE BY 

MOUTH EVERY 

DAY                                     omeprazole 

40 mg 

capsule,del

ayed 

release 

TAKE 1 

CAPSULE BY 

MOUTH EVERY 

DAY                     No                                      omeprazole 

40 mg 

capsule,de

layed 

release 

TAKE 1 

CAPSULE BY 

MOUTH 

EVERY DAY                                                   [facility]

 

                                                    OneTouch 

Delica Plus 

Lancet 33 

gauge TEST 

FOUR TIMES 

DAILY                                   [employer] 

Delica Plus 

Lancet 33 

gauge TEST 

FOUR TIMES 

DAILY                   No                                      [employer] 

Delica 

Plus 

Lancet 33 

gauge TEST 

FOUR TIMES 

DAILY                                                       [facility]

 

                                                    OneTouch 

Ultra Test 

strips TEST 

FOUR TIMES 

DAILY                                   OneTouch 

Ultra Test 

strips TEST 

FOUR TIMES 

DAILY                   No                                      OneTouch 

Ultra Test 

strips 

TEST FOUR 

TIMES 

DAILY                                                       [facility]

 

                                                    OneTouch 

Ultra2 

Meter TEST 

FOUR TIMES 

DAILY                                   [facility] 

Ultra2 

Meter TEST 

FOUR TIMES 

DAILY                   No                                      One[employer] 

Ultra2 

Meter TEST 

FOUR TIMES 

DAILY                                                       [facility]

[facility] 

[facility] 

[facility]

 

                                                    RhoGAM 

Ultra-Filte

red PLUS 

1,500 unit 

(300 mcg) 

intramuscul

ar syringe 

Inject 1 

syringe by 

intramuscul

ar route.                               R[employer] 

Ultra-Filte

red PLUS 

1,500 unit 

(300 mcg) 

intramuscul

ar syringe 

Inject 1 

syringe by 

intramuscul

ar route.                        No                               1syring

e(s)                                                RhoGAM 

Ultra-Filt

ered PLUS 

1,500 unit 

(300 mcg) 

intramuscu

lar 

syringe 

Inject 1 

syringe by 

intramuscu

lar route.                                                  [facility] 

[facility] 

[facility]

 

                                                    bupropion 

HCl  

mg 24 hr 

tablet, 

extended 

release 

TAKE 1 

TABLET BY 

MOUTH EVERY 

DAY                                     bupropion 

HCl  

mg 24 hr 

tablet, 

extended 

release 

TAKE 1 

TABLET BY 

MOUTH EVERY 

DAY                     No                                      bupropion 

HCl  

mg 24 hr 

tablet, 

extended 

release 

TAKE 1 

TABLET BY 

MOUTH 

EVERY DAY                                                   [facility] 

[facility] 

[facility]

 

                                                    enoxaparin 

40 mg/0.4 

mL 

subcutaneou

s syringe 

INJECT 40MG 

SUBCUTANEOU

S DAILY                                 enoxaparin 

40 mg/0.4 

mL 

subcutaneou

s syringe 

INJECT 40MG 

SUBCUTANEOU

S DAILY                 No                                      enoxaparin 

40 mg/0.4 

mL 

subcutaneo

us syringe 

INJECT 

40MG 

SUBCUTANEO

US DAILY                                                    [facility] 

[facility] 

[facility]

 

                                                    ferrous 

gluconate 

240 mg (27 

mg iron) 

tablet Take 

1 tablet 

every day 

by oral 

route.                                  ferrous 

gluconate 

240 mg (27 

mg iron) 

tablet Take 

1 tablet 

every day 

by oral 

route.                  No                      1       Q1D     ferrous 

gluconate 

240 mg (27 

mg iron) 

tablet 

Take 1 

tablet 

every day 

by oral 

route.                                                      [facility] 

[facility] 

[facility]

 

                                                    metoclopram

rolanda 10 mg 

tablet TAKE 

1 TABLET BY 

MOUTH FOUR 

TIMES DAILY 

AS DIRECTED                             metoclopram

rolanda 10 mg 

tablet TAKE 

1 TABLET BY 

MOUTH FOUR 

TIMES DAILY 

AS DIRECTED                 No                                      metoclopra

mide 10 mg 

tablet 

TAKE 1 

TABLET BY 

MOUTH FOUR 

TIMES 

DAILY AS 

DIRECTED                                                    [facility] 

[facility] 

[facility]

 

                                                    nifedipine 

ER 30 mg 

tablet,exte

nded 

release 24 

hr TAKE 1 

TABLET BY 

MOUTH EVERY 

DAY                                     nifedipine 

ER 30 mg 

tablet,exte

nded 

release 24 

hr TAKE 1 

TABLET BY 

MOUTH EVERY 

DAY                     No                                      nifedipine 

ER 30 mg 

tablet,ext

ended 

release 24 

hr TAKE 1 

TABLET BY 

MOUTH 

EVERY DAY                                                   [facility] 

[facility] 

[facility]

 

                                                    omeprazole 

40 mg 

capsule,del

ayed 

release 

TAKE 1 

CAPSULE BY 

MOUTH EVERY 

DAY                                     omeprazole 

40 mg 

capsule,del

ayed 

release 

TAKE 1 

CAPSULE BY 

MOUTH EVERY 

DAY                     No                                      omeprazole 

40 mg 

capsule,de

layed 

release 

TAKE 1 

CAPSULE BY 

MOUTH 

EVERY DAY                                                   [facility] 

[facility] 

[facility]

 

                                                    OneTouch 

Delica Plus 

Lancet 33 

gauge TEST 

FOUR TIMES 

DAILY                                   [employer] 

Delica Plus 

Lancet 33 

gauge TEST 

FOUR TIMES 

DAILY                   No                                      [employer] 

Delica 

Plus 

Lancet 33 

gauge TEST 

FOUR TIMES 

DAILY                                                       [facility] 

[facility] 

[facility]

 

                                                    OneTouch 

Ultra Test 

strips TEST 

FOUR TIMES 

DAILY                                   [employer] 

Ultra Test 

strips TEST 

FOUR TIMES 

DAILY                   No                                      One[employer] 

Ultra Test 

strips 

TEST FOUR 

TIMES 

DAILY                                                       [facility] 

[facility] 

[facility]

 

                                                    OneTouch 

Ultra2 

Meter TEST 

FOUR TIMES 

DAILY                                   [facility] 

Ultra2 

Meter TEST 

FOUR TIMES 

DAILY                   No                                      One[employer] 

Ultra2 

Meter TEST 

FOUR TIMES 

DAILY                                                       [facility] 

[facility] 

[facility]

 

                                                    RhoGAM 

Ultra-Filte

red PLUS 

1,500 unit 

(300 mcg) 

intramuscul

ar syringe 

Inject 1 

syringe by 

intramuscul

ar route.                               [employer] 

Ultra-Filte

red PLUS 

1,500 unit 

(300 mcg) 

intramuscul

ar syringe 

Inject 1 

syringe by 

intramuscul

ar route.                        No                               1syring

e(s)                                                RhoGAM 

Ultra-Filt

ered PLUS 

1,500 unit 

(300 mcg) 

intramuscu

lar 

syringe 

Inject 1 

syringe by 

intramuscu

lar route.                                                  [facility] 

[facility] 

[facility]

 

                                                    bupropion 

HCl  

mg 24 hr 

tablet, 

extended 

release 

TAKE 1 

TABLET BY 

MOUTH EVERY 

DAY                                     bupropion 

HCl  

mg 24 hr 

tablet, 

extended 

release 

TAKE 1 

TABLET BY 

MOUTH EVERY 

DAY                     No                                      bupropion 

HCl  

mg 24 hr 

tablet, 

extended 

release 

TAKE 1 

TABLET BY 

MOUTH 

EVERY DAY                                                   [facility] 

[facility] 

[facility]

 

                                                    enoxaparin 

40 mg/0.4 

mL 

subcutaneou

s syringe 

INJECT 40MG 

SUBCUTANEOU

S DAILY                                 enoxaparin 

40 mg/0.4 

mL 

subcutaneou

s syringe 

INJECT 40MG 

SUBCUTANEOU

S DAILY                 No                                      enoxaparin 

40 mg/0.4 

mL 

subcutaneo

us syringe 

INJECT 

40MG 

SUBCUTANEO

US DAILY                                                    [facility]

 

[facility] 

[facility]

 

                                                    ferrous 

gluconate 

240 mg (27 

mg iron) 

tablet Take 

1 tablet 

every day 

by oral 

route.                                  ferrous 

gluconate 

240 mg (27 

mg iron) 

tablet Take 

1 tablet 

every day 

by oral 

route.                  No                      1       Q1D     ferrous 

gluconate 

240 mg (27 

mg iron) 

tablet 

Take 1 

tablet 

every day 

by oral 

route.                                                      [facility] 

[facility] 

[facility]

 

                                                    metoclopram

rolanda 10 mg 

tablet TAKE 

1 TABLET BY 

MOUTH FOUR 

TIMES DAILY 

AS DIRECTED                             metoclopram

rolanda 10 mg 

tablet TAKE 

1 TABLET BY 

MOUTH FOUR 

TIMES DAILY 

AS DIRECTED                 No                                      metoclopra

mide 10 mg 

tablet 

TAKE 1 

TABLET BY 

MOUTH FOUR 

TIMES 

DAILY AS 

DIRECTED                                                    [facility]

 

                                                    nifedipine 

ER 30 mg 

tablet,exte

nded 

release 24 

hr TAKE 1 

TABLET BY 

MOUTH EVERY 

DAY                                     nifedipine 

ER 30 mg 

tablet,exte

nded 

release 24 

hr TAKE 1 

TABLET BY 

MOUTH EVERY 

DAY                     No                                      nifedipine 

ER 30 mg 

tablet,ext

ended 

release 24 

hr TAKE 1 

TABLET BY 

MOUTH 

EVERY DAY                                                   [facility]

 

                                                    omeprazole 

40 mg 

capsule,del

ayed 

release 

TAKE 1 

CAPSULE BY 

MOUTH EVERY 

DAY                                     omeprazole 

40 mg 

capsule,del

ayed 

release 

TAKE 1 

CAPSULE BY 

MOUTH EVERY 

DAY                     No                                      omeprazole 

40 mg 

capsule,de

layed 

release 

TAKE 1 

CAPSULE BY 

MOUTH 

EVERY DAY                                                   [facility]

 

                                                    OneTouch 

Delica Plus 

Lancet 33 

gauge TEST 

FOUR TIMES 

DAILY                                   [employer] 

Delica Plus 

Lancet 33 

gauge TEST 

FOUR TIMES 

DAILY                   No                                      OneTouch 

Delica 

Plus 

Lancet 33 

gauge TEST 

FOUR TIMES 

DAILY                                                       [facility]

 

                                                    OneTouch 

Ultra Test 

strips TEST 

FOUR TIMES 

DAILY                                   OneTouch 

Ultra Test 

strips TEST 

FOUR TIMES 

DAILY                   No                                      OneTouch 

Ultra Test 

strips 

TEST FOUR 

TIMES 

DAILY                                                       [facility]

 

                                                    OneTouch 

Ultra2 

Meter TEST 

FOUR TIMES 

DAILY                                   OneTouch 

Ultra2 

Meter TEST 

FOUR TIMES 

DAILY                   No                                      OneTouch 

Ultra2 

Meter TEST 

FOUR TIMES 

DAILY                                                       [facility]

 

                                                    RhoGAM 

Ultra-Filte

red PLUS 

1,500 unit 

(300 mcg) 

intramuscul

ar syringe 

Inject 1 

syringe by 

intramuscul

ar route.                               RhoGAM 

Ultra-Filte

red PLUS 

1,500 unit 

(300 mcg) 

intramuscul

ar syringe 

Inject 1 

syringe by 

intramuscul

ar route.                        No                               1syring

e(s)                                                RhoGAM 

Ultra-Filt

ered PLUS 

1,500 unit 

(300 mcg) 

intramuscu

lar 

syringe 

Inject 1 

syringe by 

intramuscu

lar route.                                                  [facility]

 

                                                    bupropion 

HCl  

mg 24 hr 

tablet, 

extended 

release 

TAKE 1 

TABLET BY 

MOUTH EVERY 

DAY                                     bupropion 

HCl  

mg 24 hr 

tablet, 

extended 

release 

TAKE 1 

TABLET BY 

MOUTH EVERY 

DAY                     No                                      bupropion 

HCl  

mg 24 hr 

tablet, 

extended 

release 

TAKE 1 

TABLET BY 

MOUTH 

EVERY DAY                                                   [facility]

 

                                                    enoxaparin 

40 mg/0.4 

mL 

subcutaneou

s syringe 

INJECT 40MG 

SUBCUTANEOU

S DAILY                                 enoxaparin 

40 mg/0.4 

mL 

subcutaneou

s syringe 

INJECT 40MG 

SUBCUTANEOU

S DAILY                 No                                      enoxaparin 

40 mg/0.4 

mL 

subcutaneo

us syringe 

INJECT 

40MG 

SUBCUTANEO

US DAILY                                                    [facility]

 

                                                    ferrous 

gluconate 

240 mg (27 

mg iron) 

tablet Take 

1 tablet 

every day 

by oral 

route.                                  ferrous 

gluconate 

240 mg (27 

mg iron) 

tablet Take 

1 tablet 

every day 

by oral 

route.                  No                      1       Q1D     ferrous 

gluconate 

240 mg (27 

mg iron) 

tablet 

Take 1 

tablet 

every day 

by oral 

route.                                                      [facility]

 

                                                    metoclopram

rolanda 10 mg 

tablet TAKE 

1 TABLET BY 

MOUTH FOUR 

TIMES DAILY 

AS DIRECTED                             metoclopram

rolanda 10 mg 

tablet TAKE 

1 TABLET BY 

MOUTH FOUR 

TIMES DAILY 

AS DIRECTED                 No                                      metoclopra

mide 10 mg 

tablet 

TAKE 1 

TABLET BY 

MOUTH FOUR 

TIMES 

DAILY AS 

DIRECTED                                                    [facility]

 

                                                    nifedipine 

ER 30 mg 

tablet,exte

nded 

release 24 

hr TAKE 1 

TABLET BY 

MOUTH EVERY 

DAY                                     nifedipine 

ER 30 mg 

tablet,exte

nded 

release 24 

hr TAKE 1 

TABLET BY 

MOUTH EVERY 

DAY                     No                                      nifedipine 

ER 30 mg 

tablet,ext

ended 

release 24 

hr TAKE 1 

TABLET BY 

MOUTH 

EVERY DAY                                                   [facility] 

[facility]

 

                                                    omeprazole 

40 mg 

capsule,del

ayed 

release 

TAKE 1 

CAPSULE BY 

MOUTH EVERY 

DAY                                     omeprazole 

40 mg 

capsule,del

ayed 

release 

TAKE 1 

CAPSULE BY 

MOUTH EVERY 

DAY                     No                                      omeprazole 

40 mg 

capsule,de

layed 

release 

TAKE 1 

CAPSULE BY 

MOUTH 

EVERY DAY                                                   [facility]

 

                                                    OneTouch 

Delica Plus 

Lancet 33 

gauge TEST 

FOUR TIMES 

DAILY                                   [employer] 

Delica Plus 

Lancet 33 

gauge TEST 

FOUR TIMES 

DAILY                   No                                      [employer] 

Delica 

Plus 

Lancet 33 

gauge TEST 

FOUR TIMES 

DAILY                                                       [facility]

 

                                                    OneTouch 

Ultra Test 

strips TEST 

FOUR TIMES 

DAILY                                   [employer] 

Ultra Test 

strips TEST 

FOUR TIMES 

DAILY                   No                                      OneTouch 

Ultra Test 

strips 

TEST FOUR 

TIMES 

DAILY                                                       [facility]

 

                                                    OneTouch 

Ultra2 

Meter TEST 

FOUR TIMES 

DAILY                                   [employer] 

Ultra2 

Meter TEST 

FOUR TIMES 

DAILY                   No                                      OneTouch 

Ultra2 

Meter TEST 

FOUR TIMES 

DAILY                                                       [facility]

 

                                                    RhoGAM 

Ultra-Filte

red PLUS 

1,500 unit 

(300 mcg) 

intramuscul

ar syringe 

Inject 1 

syringe by 

intramuscul

ar route.                               RhoGAM 

Ultra-Filte

red PLUS 

1,500 unit 

(300 mcg) 

intramuscul

ar syringe 

Inject 1 

syringe by 

intramuscul

ar route.                        No                               1syring

e(s)                                                RhoGAM 

Ultra-Filt

ered PLUS 

1,500 unit 

(300 mcg) 

intramuscu

lar 

syringe 

Inject 1 

syringe by 

intramuscu

lar route.                                                  [facility]

 

                                                    amoxicillin 

875 

mg-potassiu

m 

clavulanate 

125 mg 

tablet TAKE 

1 TABLET BY 

MOUTH TWICE 

DAILY                                   amoxicillin 

875 

mg-potassiu

m 

clavulanate 

125 mg 

tablet TAKE 

1 TABLET BY 

MOUTH TWICE 

DAILY                   No                                      amoxicilli

n 875 

mg-potassi

um 

clavulanat

e 125 mg 

tablet 

TAKE 1 

TABLET BY 

MOUTH 

TWICE 

DAILY                                                       [facility]

 

                                                    bupropion 

HCl  

mg 24 hr 

tablet, 

extended 

release 

TAKE 1 

TABLET BY 

MOUTH EVERY 

DAY                                     bupropion 

HCl  

mg 24 hr 

tablet, 

extended 

release 

TAKE 1 

TABLET BY 

MOUTH EVERY 

DAY                     No                                      bupropion 

HCl  

mg 24 hr 

tablet, 

extended 

release 

TAKE 1 

TABLET BY 

MOUTH 

EVERY DAY                                                   [facility]

 

                                                    enoxaparin 

40 mg/0.4 

mL 

subcutaneou

s syringe 

INJECT 40MG 

SUBCUTANEOU

S DAILY                                 enoxaparin 

40 mg/0.4 

mL 

subcutaneou

s syringe 

INJECT 40MG 

SUBCUTANEOU

S DAILY                 No                                      enoxaparin 

40 mg/0.4 

mL 

subcutaneo

us syringe 

INJECT 

40MG 

SUBCUTANEO

US DAILY                                                    [facility]

 

                                                    ferrous 

gluconate 

240 mg (27 

mg iron) 

tablet Take 

1 tablet 

every day 

by oral 

route.                                  ferrous 

gluconate 

240 mg (27 

mg iron) 

tablet Take 

1 tablet 

every day 

by oral 

route.                  No                      1       Q1D     ferrous 

gluconate 

240 mg (27 

mg iron) 

tablet 

Take 1 

tablet 

every day 

by oral 

route.                                                      [facility]

 

                                                    Lexapro 20 

mg tablet 

Take 1 

tablet 

every day 

by oral 

route.                                  Lexapro 20 

mg tablet 

Take 1 

tablet 

every day 

by oral 

route.                  No                      1       Q1D     Lexapro 20 

mg tablet 

Take 1 

tablet 

every day 

by oral 

route.                                                      [facility] 

[facility]

 

                                                    losartan 50 

mg-hydrochl

orothiazide 

12.5 mg 

tablet Take 

1 tablet 

every day 

by oral 

route.                                  losartan 50 

mg-hydrochl

orothiazide 

12.5 mg 

tablet Take 

1 tablet 

every day 

by oral 

route.                  No                      1       Q1D     losartan 

50 

mg-hydroch

lorothiazi

de 12.5 mg 

tablet 

Take 1 

tablet 

every day 

by oral 

route.                                                      [facility]

 

                                                    metoclopram

rolanda 10 mg 

tablet TAKE 

1 TABLET BY 

MOUTH FOUR 

TIMES DAILY 

AS DIRECTED                             metoclopram

rolanda 10 mg 

tablet TAKE 

1 TABLET BY 

MOUTH FOUR 

TIMES DAILY 

AS DIRECTED                 No                                      metoclopra

mide 10 mg 

tablet 

TAKE 1 

TABLET BY 

MOUTH FOUR 

TIMES 

DAILY AS 

DIRECTED                                                    [facility]

 

                                                    nifedipine 

ER 30 mg 

tablet,exte

nded 

release 24 

hr TAKE 1 

TABLET BY 

MOUTH EVERY 

DAY                                     nifedipine 

ER 30 mg 

tablet,exte

nded 

release 24 

hr TAKE 1 

TABLET BY 

MOUTH EVERY 

DAY                     No                                      nifedipine 

ER 30 mg 

tablet,ext

ended 

release 24 

hr TAKE 1 

TABLET BY 

MOUTH 

EVERY DAY                                                   [facility]

 

                                                    omeprazole 

40 mg 

capsule,del

ayed 

release 

TAKE 1 

CAPSULE BY 

MOUTH EVERY 

DAY                                     omeprazole 

40 mg 

capsule,del

ayed 

release 

TAKE 1 

CAPSULE BY 

MOUTH EVERY 

DAY                     No                                      omeprazole 

40 mg 

capsule,de

layed 

release 

TAKE 1 

CAPSULE BY 

MOUTH 

EVERY DAY                                                   [facility]

 

                                                    OneTouch 

Delica Plus 

Lancet 33 

gauge TEST 

FOUR TIMES 

DAILY                                   [employer] 

Delica Plus 

Lancet 33 

gauge TEST 

FOUR TIMES 

DAILY                   No                                      [employer] 

Delica 

Plus 

Lancet 33 

gauge TEST 

FOUR TIMES 

DAILY                                                       [facility]

 

                                                    OneTouch 

Ultra Test 

strips TEST 

FOUR TIMES 

DAILY                                   [employer] 

Ultra Test 

strips TEST 

FOUR TIMES 

DAILY                   No                                      OneTouch 

Ultra Test 

strips 

TEST FOUR 

TIMES 

DAILY                                                       [facility]

 

                                                    OneTouch 

Ultra2 

Meter TEST 

FOUR TIMES 

DAILY                                   [employer] 

Ultra2 

Meter TEST 

FOUR TIMES 

DAILY                   No                                      OneTouch 

Ultra2 

Meter TEST 

FOUR TIMES 

DAILY                                                       [facility]

 

                                                    RhoGAM 

Ultra-Filte

red PLUS 

1,500 unit 

(300 mcg) 

intramuscul

ar syringe 

Inject 1 

syringe by 

intramuscul

ar route.                               RhoGAM 

Ultra-Filte

red PLUS 

1,500 unit 

(300 mcg) 

intramuscul

ar syringe 

Inject 1 

syringe by 

intramuscul

ar route.                        No                               1syring

e(s)                                                RhoGAM 

Ultra-Filt

ered PLUS 

1,500 unit 

(300 mcg) 

intramuscu

lar 

syringe 

Inject 1 

syringe by 

intramuscu

lar route.                                                  [facility]

 

                                                    ciprofloxac

in 500 mg 

tablet TAKE 

1 TABLET BY 

MOUTH TWICE 

DAILY                                   ciprofloxac

in 500 mg 

tablet TAKE 

1 TABLET BY 

MOUTH TWICE 

DAILY                   No                                      ciprofloxa

cordell 500 mg 

tablet 

TAKE 1 

TABLET BY 

MOUTH 

TWICE 

DAILY                                                       [facility]

 

                                                    Cortisporin

-TC 3.3 

mg-3 mg-10 

mg-0.5 

mg/mL ear 

drops,suspe

nsion 

INSTILL 1 

DROP INTO 

AFFECTED 

EAR(S) 

EVERY 4 

HOURS                                   Cortisporin

-TC 3.3 

mg-3 mg-10 

mg-0.5 

mg/mL ear 

drops,suspe

nsion 

INSTILL 1 

DROP INTO 

AFFECTED 

EAR(S) 

EVERY 4 

HOURS                   No                                      Cortispori

n-TC 3.3 

mg-3 mg-10 

mg-0.5 

mg/mL ear 

drops,susp

ension 

INSTILL 1 

DROP INTO 

AFFECTED 

EAR(S) 

EVERY 4 

HOURS                                                       [facility]

 

                                                    lisinopril 

10 

mg-hydrochl

orothiazide 

12.5 mg 

tablet TAKE 

1 TABLET BY 

MOUTH ONCE 

DAILY FOR 

BLOOD 

PRESSURE                                lisinopril 

10 

mg-hydrochl

orothiazide 

12.5 mg 

tablet TAKE 

1 TABLET BY 

MOUTH ONCE 

DAILY FOR 

BLOOD 

PRESSURE                 No                                      lisinopril 

10 

mg-hydroch

lorothiazi

de 12.5 mg 

tablet 

TAKE 1 

TABLET BY 

MOUTH ONCE 

DAILY FOR 

BLOOD 

PRESSURE                                                    [facility]

 

                                                    metformin 

500 mg 

tablet Take 

1 tablet 

twice a day 

by oral 

route.                                  metformin 

500 mg 

tablet Take 

1 tablet 

twice a day 

by oral 

route.                  No                      1       BID     metformin 

500 mg 

tablet 

Take 1 

tablet 

twice a 

day by 

oral 

route.                                                      [facility]

 

                                                    acetaminoph

en 300 

mg-codeine 

30 mg 

tablet Take 

1-2 tabs 

p.o. q 6 

hours PRN 

pain                                    acetaminoph

en 300 

mg-codeine 

30 mg 

tablet Take 

1-2 tabs 

p.o. q 6 

hours PRN 

pain                    No                                      acetaminop

hen 300 

mg-codeine 

30 mg 

tablet 

Take 1-2 

tabs p.o. 

q 6 hours 

PRN pain                                                    [facility]

 

                                                    bupropion 

HCl  

mg 24 hr 

tablet, 

extended 

release 

TAKE 1 

TABLET BY 

MOUTH EVERY 

DAY                                     bupropion 

HCl  

mg 24 hr 

tablet, 

extended 

release 

TAKE 1 

TABLET BY 

MOUTH EVERY 

DAY                     No                                      bupropion 

HCl  

mg 24 hr 

tablet, 

extended 

release 

TAKE 1 

TABLET BY 

MOUTH 

EVERY DAY                                                   [facility]

 

                                                    enoxaparin 

40 mg/0.4 

mL 

subcutaneou

s syringe 

INJECT 40MG 

SUBCUTANEOU

S DAILY                                 enoxaparin 

40 mg/0.4 

mL 

subcutaneou

s syringe 

INJECT 40MG 

SUBCUTANEOU

S DAILY                 No                                      enoxaparin 

40 mg/0.4 

mL 

subcutaneo

us syringe 

INJECT 

40MG 

SUBCUTANEO

US DAILY                                                    [facility]

 

                                                    escitalopra

m 20 mg 

tablet TAKE 

1 TABLET BY 

MOUTH EVERY 

DAY                                     escitalopra

m 20 mg 

tablet TAKE 

1 TABLET BY 

MOUTH EVERY 

DAY                     No                                      escitalopr

am 20 mg 

tablet 

TAKE 1 

TABLET BY 

MOUTH 

EVERY DAY                                                   [facility]

 

                                                    ferrous 

gluconate 

240 mg (27 

mg iron) 

tablet Take 

1 tablet 

every day 

by oral 

route.                                  ferrous 

gluconate 

240 mg (27 

mg iron) 

tablet Take 

1 tablet 

every day 

by oral 

route.                  No                      1       Q1D     ferrous 

gluconate 

240 mg (27 

mg iron) 

tablet 

Take 1 

tablet 

every day 

by oral 

route.                                                      [facility]

 

                                                    ibuprofen 

800 mg 

tablet Take 

1 tablet 

every 6 

hours by 

oral route 

as needed.                              ibuprofen 

800 mg 

tablet Take 

1 tablet 

every 6 

hours by 

oral route 

as needed.                 No                      1       Q6H     ibuprofen 

800 mg 

tablet 

Take 1 

tablet 

every 6 

hours by 

oral route 

as needed.                                                  [facility]

 

                                                    methylpredn

isolone 4 

mg tablets 

in a dose 

pack TAKE 

AS DIRECTED                             methylpredn

isolone 4 

mg tablets 

in a dose 

pack TAKE 

AS DIRECTED                 No                                      methylpred

nisolone 4 

mg tablets 

in a dose 

pack TAKE 

AS 

DIRECTED                                                    [facility]

 

                                                    losartan 50 

mg-hydrochl

orothiazide 

12.5 mg 

tablet TAKE 

1 TABLET BY 

MOUTH EVERY 

DAY                                     losartan 50 

mg-hydrochl

orothiazide 

12.5 mg 

tablet TAKE 

1 TABLET BY 

MOUTH EVERY 

DAY                     No                                      losartan 

50 

mg-hydroch

lorothiazi

de 12.5 mg 

tablet 

TAKE 1 

TABLET BY 

MOUTH 

EVERY DAY                                                   [facility]

 

                                                    omeprazole 

40 mg 

capsule,del

ayed 

release 

TAKE 1 

CAPSULE BY 

MOUTH EVERY 

DAY                                     omeprazole 

40 mg 

capsule,del

ayed 

release 

TAKE 1 

CAPSULE BY 

MOUTH EVERY 

DAY                     No                                      omeprazole 

40 mg 

capsule,de

layed 

release 

TAKE 1 

CAPSULE BY 

MOUTH 

EVERY DAY                                                   [facility]

 

                                                    OneTouch 

Delica Plus 

Lancet 33 

gauge TEST 

FOUR TIMES 

DAILY                                   [employer] 

Delica Plus 

Lancet 33 

gauge TEST 

FOUR TIMES 

DAILY                   No                                      [employer] 

Delica 

Plus 

Lancet 33 

gauge TEST 

FOUR TIMES 

DAILY                                                       [facility]

 

                                                    OneTouch 

Ultra Test 

strips TEST 

FOUR TIMES 

DAILY                                   [employer] 

Ultra Test 

strips TEST 

FOUR TIMES 

DAILY                   No                                      [employer] 

Ultra Test 

strips 

TEST FOUR 

TIMES 

DAILY                                                       [facility]

 

                                                    OneTouch 

Ultra2 

Meter TEST 

FOUR TIMES 

DAILY                                   [employer] 

Ultra2 

Meter TEST 

FOUR TIMES 

DAILY                   No                                      [employer] 

Ultra2 

Meter TEST 

FOUR TIMES 

DAILY                                                       [facility] [facility]

 

                                                    RhoGAM 

Ultra-Filte

red PLUS 

1,500 unit 

(300 mcg) 

intramuscul

ar syringe 

Inject 1 

syringe by 

intramuscul

ar route.                               RhoGAM 

Ultra-Filte

red PLUS 

1,500 unit 

(300 mcg) 

intramuscul

ar syringe 

Inject 1 

syringe by 

intramuscul

ar route.                        No                               1syring

e(s)                                                RhoGAM 

Ultra-Filt

ered PLUS 

1,500 unit 

(300 mcg) 

intramuscu

lar 

syringe 

Inject 1 

syringe by 

intramuscu

lar route.                                                  [facility] 

[facility]

 

                                                    phenazopyri

dine 200 mg 

tablet                                  phenazopyri

dine 200 mg 

tablet                  No                                      phenazopyr

idine 200 

mg tablet                                                   [facility] 

[facility]

 

                                                    Prescriptio

n - Prior 

Authorizati

on Request                              Prescriptio

n - Prior 

Authorizati

on Request                 No                                      Prescripti

on - Prior 

Authorizat

ion 

Request                                                     [facility] 

[facility]

 

                                                    progesteron

e 

micronized 

200 mg 

capsule 

Take 1 

capsule 

twice a day 

by oral 

route.                                  progesteron

e 

micronized 

200 mg 

capsule 

Take 1 

capsule 

twice a day 

by oral 

route.                           No                               1capsul

e(s)                      BID                       progestero

ne 

micronized 

200 mg 

capsule 

Take 1 

capsule 

twice a 

day by 

oral 

route.                                                      [facility] 

[facility]

 

                                                    acetaminoph

en 300 

mg-codeine 

30 mg 

tablet TAKE 

1 TO 2 

TABLETS BY 

MOUTH EVERY 

6 HOURS AS 

NEEDED FOR 

PAIN                                    acetaminoph

en 300 

mg-codeine 

30 mg 

tablet TAKE 

1 TO 2 

TABLETS BY 

MOUTH EVERY 

6 HOURS AS 

NEEDED FOR 

PAIN                    No                                      acetaminop

hen 300 

mg-codeine 

30 mg 

tablet 

TAKE 1 TO 

2 TABLETS 

BY MOUTH 

EVERY 6 

HOURS AS 

NEEDED FOR 

PAIN                                                        [facility]

 

                                                    bupropion 

HCl  

mg 24 hr 

tablet, 

extended 

release 

TAKE 1 

TABLET BY 

MOUTH EVERY 

DAY                                     bupropion 

HCl  

mg 24 hr 

tablet, 

extended 

release 

TAKE 1 

TABLET BY 

MOUTH EVERY 

DAY                     No                                      bupropion 

HCl  

mg 24 hr 

tablet, 

extended 

release 

TAKE 1 

TABLET BY 

MOUTH 

EVERY DAY                                                   [facility]

 

                                                    cefuroxime 

axetil 500 

mg tablet 

Take 1 

tablet 

twice a day 

by oral 

route for 5 

days.                                   cefuroxime 

axetil 500 

mg tablet 

Take 1 

tablet 

twice a day 

by oral 

route for 5 

days.                   No                      1       BID     cefuroxime 

axetil 500 

mg tablet 

Take 1 

tablet 

twice a 

day by 

oral route 

for 5 

days.                                                       [facility]

 

                                                    Diflucan 

100 mg 

tablet Take 

1 tablet by 

oral route.                             Diflucan 

100 mg 

tablet Take 

1 tablet by 

oral route.                 No                      1               Diflucan 

100 mg 

tablet 

Take 1 

tablet by 

oral 

route.                                                      [facility] 

[facility]

 

                                                    enoxaparin 

40 mg/0.4 

mL 

subcutaneou

s syringe 

INJECT 40MG 

SUBCUTANEOU

S DAILY                                 enoxaparin 

40 mg/0.4 

mL 

subcutaneou

s syringe 

INJECT 40MG 

SUBCUTANEOU

S DAILY                 No                                      enoxaparin 

40 mg/0.4 

mL 

subcutaneo

us syringe 

INJECT 

40MG 

SUBCUTANEO

US DAILY                                                    [facility] 

[facility] 

[facility]

 

                                                    escitalopra

m 20 mg 

tablet TAKE 

1 TABLET BY 

MOUTH EVERY 

DAY                                     escitalopra

m 20 mg 

tablet TAKE 

1 TABLET BY 

MOUTH EVERY 

DAY                     No                                      escitalopr

am 20 mg 

tablet 

TAKE 1 

TABLET BY 

MOUTH 

EVERY DAY                                                   [facility] 

[facility] 

[facility]

 

                                                    ferrous 

gluconate 

240 mg (27 

mg iron) 

tablet Take 

1 tablet 

every day 

by oral 

route.                                  ferrous 

gluconate 

240 mg (27 

mg iron) 

tablet Take 

1 tablet 

every day 

by oral 

route.                  No                      1       Q1D     ferrous 

gluconate 

240 mg (27 

mg iron) 

tablet 

Take 1 

tablet 

every day 

by oral 

route.                                                      [facility] 

[facility] 

[facility]

 

                                                    ibuprofen 

800 mg 

tablet TAKE 

1 TABLET BY 

MOUTH EVERY 

6 HOURS AS 

NEEDED                                  ibuprofen 

800 mg 

tablet TAKE 

1 TABLET BY 

MOUTH EVERY 

6 HOURS AS 

NEEDED                  No                                      ibuprofen 

800 mg 

tablet 

TAKE 1 

TABLET BY 

MOUTH 

EVERY 6 

HOURS AS 

NEEDED                                                      [facility] 

[facility] 

[facility]

 

                                                    losartan 50 

mg-hydrochl

orothiazide 

12.5 mg 

tablet TAKE 

1 TABLET BY 

MOUTH EVERY 

DAY                                     losartan 50 

mg-hydrochl

orothiazide 

12.5 mg 

tablet TAKE 

1 TABLET BY 

MOUTH EVERY 

DAY                     No                                      losartan 

50 

mg-hydroch

lorothiazi

de 12.5 mg 

tablet 

TAKE 1 

TABLET BY 

MOUTH 

EVERY DAY                                                   [facility] 

[facility]

 

                                                    metoclopram

rolanda 10 mg 

tablet TAKE 

1 TABLET BY 

MOUTH FOUR 

TIMES DAILY 

AS DIRECTED                             metoclopram

rolanda 10 mg 

tablet TAKE 

1 TABLET BY 

MOUTH FOUR 

TIMES DAILY 

AS DIRECTED                 No                                      metoclopra

mide 10 mg 

tablet 

TAKE 1 

TABLET BY 

MOUTH FOUR 

TIMES 

DAILY AS 

DIRECTED                                                    [facility] 

[facility] 

[facility]

 

                                                    omeprazole 

40 mg 

capsule,del

ayed 

release 

TAKE 1 

CAPSULE BY 

MOUTH EVERY 

DAY                                     omeprazole 

40 mg 

capsule,del

ayed 

release 

TAKE 1 

CAPSULE BY 

MOUTH EVERY 

DAY                     No                                      omeprazole 

40 mg 

capsule,de

layed 

release 

TAKE 1 

CAPSULE BY 

MOUTH 

EVERY DAY                                                   [facility] 

[facility] 

[facility]

 

                                                    OneTouch 

Delica Plus 

Lancet 33 

gauge TEST 

FOUR TIMES 

DAILY                                   [employer] 

Delica Plus 

Lancet 33 

gauge TEST 

FOUR TIMES 

DAILY                   No                                      [employer] 

Delica 

Plus 

Lancet 33 

gauge TEST 

FOUR TIMES 

DAILY                                                       [facility] 

[facility] 

[facility]

 

                                                    OneTouch 

Ultra Test 

strips TEST 

FOUR TIMES 

DAILY                                   OneTouch 

Ultra Test 

strips TEST 

FOUR TIMES 

DAILY                   No                                      OneTouch 

Ultra Test 

strips 

TEST FOUR 

TIMES 

DAILY                                                       [facility] 

[facility]

 

                                                    OneTouch 

Ultra2 

Meter TEST 

FOUR TIMES 

DAILY                                   OneTouch 

Ultra2 

Meter TEST 

FOUR TIMES 

DAILY                   No                                      OneTouch 

Ultra2 

Meter TEST 

FOUR TIMES 

DAILY                                                       [facility]

 

[facility] 

[facility]

 

                                                    RhoGAM 

Ultra-Filte

red PLUS 

1,500 unit 

(300 mcg) 

intramuscul

ar syringe 

Inject 1 

syringe by 

intramuscul

ar route.                               RhoGAM 

Ultra-Filte

red PLUS 

1,500 unit 

(300 mcg) 

intramuscul

ar syringe 

Inject 1 

syringe by 

intramuscul

ar route.                        No                               1syring

e(s)                                                RhoGAM 

Ultra-Filt

ered PLUS 

1,500 unit 

(300 mcg) 

intramuscu

lar 

syringe 

Inject 1 

syringe by 

intramuscu

lar route.                                                  [facility] 

[facility] 

[facility]

 

                                                    acetaminoph

en 300 

mg-codeine 

30 mg 

tablet TAKE 

1 TO 2 

TABLETS BY 

MOUTH EVERY 

6 HOURS AS 

NEEDED FOR 

PAIN                                    acetaminoph

en 300 

mg-codeine 

30 mg 

tablet TAKE 

1 TO 2 

TABLETS BY 

MOUTH EVERY 

6 HOURS AS 

NEEDED FOR 

PAIN                    No                                      acetaminop

hen 300 

mg-codeine 

30 mg 

tablet 

TAKE 1 TO 

2 TABLETS 

BY MOUTH 

EVERY 6 

HOURS AS 

NEEDED FOR 

PAIN                                                        [facility] 

[facility] 

[facility]

 

                                                    bupropion 

HCl  

mg 24 hr 

tablet, 

extended 

release 

TAKE 1 

TABLET BY 

MOUTH EVERY 

DAY                                     bupropion 

HCl  

mg 24 hr 

tablet, 

extended 

release 

TAKE 1 

TABLET BY 

MOUTH EVERY 

DAY                     No                                      bupropion 

HCl  

mg 24 hr 

tablet, 

extended 

release 

TAKE 1 

TABLET BY 

MOUTH 

EVERY DAY                                                   [facility]

 

[facility] 

[facility]

 

                                                    cefuroxime 

axetil 500 

mg tablet 

TAKE 1 

TABLET BY 

MOUTH TWICE 

DAILY FOR 5 

DAYS                                    cefuroxime 

axetil 500 

mg tablet 

TAKE 1 

TABLET BY 

MOUTH TWICE 

DAILY FOR 5 

DAYS                    No                                      cefuroxime 

axetil 500 

mg tablet 

TAKE 1 

TABLET BY 

MOUTH 

TWICE 

DAILY FOR 

5 DAYS                                                      [facility]

 

[facility] 

[facility]

 

                                                    enoxaparin 

40 mg/0.4 

mL 

subcutaneou

s syringe 

INJECT 40MG 

SUBCUTANEOU

S DAILY                                 enoxaparin 

40 mg/0.4 

mL 

subcutaneou

s syringe 

INJECT 40MG 

SUBCUTANEOU

S DAILY                 No                                      enoxaparin 

40 mg/0.4 

mL 

subcutaneo

us syringe 

INJECT 

40MG 

SUBCUTANEO

US DAILY                                                    [facility]

 

[facility] 

[facility]

 

                                                    escitalopra

m 20 mg 

tablet TAKE 

1 TABLET BY 

MOUTH EVERY 

DAY                                     escitalopra

m 20 mg 

tablet TAKE 

1 TABLET BY 

MOUTH EVERY 

DAY                     No                                      escitalopr

am 20 mg 

tablet 

TAKE 1 

TABLET BY 

MOUTH 

EVERY DAY                                                   [facility] 

[facility] 

[facility]

 

                                                    ferrous 

gluconate 

240 mg (27 

mg iron) 

tablet Take 

1 tablet 

every day 

by oral 

route.                                  ferrous 

gluconate 

240 mg (27 

mg iron) 

tablet Take 

1 tablet 

every day 

by oral 

route.                  No                      1       Q1D     ferrous 

gluconate 

240 mg (27 

mg iron) 

tablet 

Take 1 

tablet 

every day 

by oral 

route.                                                      [facility]

 

                                                    fluconazole 

100 mg 

tablet TAKE 

1 TABLET BY 

MOUTH AS 

DIRECTED                                fluconazole 

100 mg 

tablet TAKE 

1 TABLET BY 

MOUTH AS 

DIRECTED                 No                                      fluconazol

e 100 mg 

tablet 

TAKE 1 

TABLET BY 

MOUTH AS 

DIRECTED                                                    [facility]

 

                                                    ibuprofen 

800 mg 

tablet TAKE 

1 TABLET BY 

MOUTH EVERY 

6 HOURS AS 

NEEDED                                  ibuprofen 

800 mg 

tablet TAKE 

1 TABLET BY 

MOUTH EVERY 

6 HOURS AS 

NEEDED                  No                                      ibuprofen 

800 mg 

tablet 

TAKE 1 

TABLET BY 

MOUTH 

EVERY 6 

HOURS AS 

NEEDED                                                      [facility]

 

                                                    losartan 50 

mg-hydrochl

orothiazide 

12.5 mg 

tablet TAKE 

1 TABLET BY 

MOUTH EVERY 

DAY                                     losartan 50 

mg-hydrochl

orothiazide 

12.5 mg 

tablet TAKE 

1 TABLET BY 

MOUTH EVERY 

DAY                     No                                      losartan 

50 

mg-hydroch

lorothiazi

de 12.5 mg 

tablet 

TAKE 1 

TABLET BY 

MOUTH 

EVERY DAY                                                   [facility]

 

                                                    metoclopram

rolanda 10 mg 

tablet TAKE 

1 TABLET BY 

MOUTH FOUR 

TIMES DAILY 

AS DIRECTED                             metoclopram

rolanda 10 mg 

tablet TAKE 

1 TABLET BY 

MOUTH FOUR 

TIMES DAILY 

AS DIRECTED                 No                                      metoclopra

mide 10 mg 

tablet 

TAKE 1 

TABLET BY 

MOUTH FOUR 

TIMES 

DAILY AS 

DIRECTED                                                    [facility]

 

                                                    omeprazole 

40 mg 

capsule,del

ayed 

release 

TAKE 1 

CAPSULE BY 

MOUTH EVERY 

DAY                                     omeprazole 

40 mg 

capsule,del

ayed 

release 

TAKE 1 

CAPSULE BY 

MOUTH EVERY 

DAY                     No                                      omeprazole 

40 mg 

capsule,de

layed 

release 

TAKE 1 

CAPSULE BY 

MOUTH 

EVERY DAY                                                   [facility]

 

                                                    OneTouch 

Delica Plus 

Lancet 33 

gauge TEST 

FOUR TIMES 

DAILY                                   [employer] 

Delica Plus 

Lancet 33 

gauge TEST 

FOUR TIMES 

DAILY                   No                                      [employer] 

Delica 

Plus 

Lancet 33 

gauge TEST 

FOUR TIMES 

DAILY                                                       [facility]

 

                                                    OneTouch 

Ultra Test 

strips TEST 

FOUR TIMES 

DAILY                                   One[employer] 

Ultra Test 

strips TEST 

FOUR TIMES 

DAILY                   No                                      OneTouch 

Ultra Test 

strips 

TEST FOUR 

TIMES 

DAILY                                                       [facility]

 

                                                    OneTouch 

Ultra2 

Meter TEST 

FOUR TIMES 

DAILY                                   [employer] 

Ultra2 

Meter TEST 

FOUR TIMES 

DAILY                   No                                      One[employer] 

Ultra2 

Meter TEST 

FOUR TIMES 

DAILY                                                       [facility]

 

                                                    RhoGAM 

Ultra-Filte

red PLUS 

1,500 unit 

(300 mcg) 

intramuscul

ar syringe 

Inject 1 

syringe by 

intramuscul

ar route.                               [employer] 

Ultra-Filte

red PLUS 

1,500 unit 

(300 mcg) 

intramuscul

ar syringe 

Inject 1 

syringe by 

intramuscul

ar route.                        No                               1syring

e(s)                                                RhoGAM 

Ultra-Filt

ered PLUS 

1,500 unit 

(300 mcg) 

intramuscu

lar 

syringe 

Inject 1 

syringe by 

intramuscu

lar route.                                                  [facility]







Immunizations





                                                    Ordered 

Immunization Name                       Filled 

Immunization Name Date            Status          Comments        Source

 

                                                    COVID-19, mRNA, 

LNP-S, PF, 30 

mcg/0.3 mL dose 

(Pfizer-BioNTech) - 

ML                                      COVID-19, mRNA, 

LNP-S, PF, 30 

mcg/0.3 mL dose 

(Pf[employer]) 

-                                     2021 

00:00:00            Completed                               [facility]

 

                                                    COVID-19, mRNA, 

LNP-S, PF, 30 

mcg/0.3 mL dose 

([email or web address]) - 

ML                                      COVID-19, mRNA, 

LNP-S, PF, 30 

mcg/0.3 mL dose 

(Pfizer-BioNTech) 

-                                     2021 

00:00:00            Completed                               [facility]

 

                                                    COVID-19 

(SARS-COV-2) 

vaccine, 

unspecified                             COVID-19 

(SARS-COV-2) 

vaccine, 

unspecified                             2021 

00:00:00            Completed                               [facility]

 

                                                    COVID-19 

(SARS-COV-2) 

vaccine, 

unspecified                             COVID-19 

(SARS-COV-2) 

vaccine, 

unspecified                             2021 

00:00:00            Completed                               [facility]

 

                                                    COVID-19 

(SARS-COV-2) 

vaccine, 

unspecified                             COVID-19 

(SARS-COV-2) 

vaccine, 

unspecified                             2021 

00:00:00            Completed                               [facility]

 

                                                    COVID-19 

(SARS-COV-2) 

vaccine, 

unspecified                             COVID-19 

(SARS-COV-2) 

vaccine, 

unspecified                             2021 

00:00:00            Completed                               [facility]

 

                                                    COVID-19 

(SARS-COV-2) 

vaccine, 

unspecified                             COVID-19 

(SARS-COV-2) 

vaccine, 

unspecified                             2021 

00:00:00            Completed                               [facility]

 

                                                    COVID-19 

(SARS-COV-2) 

vaccine, 

unspecified                             COVID-19 

(SARS-COV-2) 

vaccine, 

unspecified                             2021 

00:00:00            Completed                               [facility]

 

                                                    COVID-19 

(SARS-COV-2) 

vaccine, 

unspecified                             COVID-19 

(SARS-COV-2) 

vaccine, 

unspecified                             2021 

00:00:00            Completed                               [facility]

 

                                                    COVID-19 

(SARS-COV-2) 

vaccine, 

unspecified                             COVID-19 

(SARS-COV-2) 

vaccine, 

unspecified                             2021 

00:00:00            Completed                               [facility]

 

                                                    COVID-19 

(SARS-COV-2) 

vaccine, 

unspecified                             COVID-19 

(SARS-COV-2) 

vaccine, 

unspecified                             2021 

00:00:00            Completed                               [facility]

 

                                                    COVID-19 

(SARS-COV-2) 

vaccine, 

unspecified                             COVID-19 

(SARS-COV-2) 

vaccine, 

unspecified                             2021 

00:00:00            Completed                               [facility]

 

                                                    COVID-19 

(SARS-COV-2) 

vaccine, 

unspecified                             COVID-19 

(SARS-COV-2) 

vaccine, 

unspecified                             2021 

00:00:00            Completed                               [facility]

 

                                                    COVID-19 

(SARS-COV-2) 

vaccine, 

unspecified                             COVID-19 

(SARS-COV-2) 

vaccine, 

unspecified                             2021 

00:00:00            Completed                               [facility]

 

                                                    COVID-19 

(SARS-COV-2) 

vaccine, 

unspecified                             COVID-19 

(SARS-COV-2) 

vaccine, 

unspecified                             2021 

00:00:00            Completed                               [facility]

 

                                                    COVID-19 

(SARS-COV-2) 

vaccine, 

unspecified                             COVID-19 

(SARS-COV-2) 

vaccine, 

unspecified                             2021 

00:00:00            Completed                               [facility]

 

                                                    COVID-19 

(SARS-COV-2) 

vaccine, 

unspecified                             COVID-19 

(SARS-COV-2) 

vaccine, 

unspecified                             2021 

00:00:00            Completed                               [facility]

 

                                                    COVID-19 

(SARS-COV-2) 

vaccine, 

unspecified                             COVID-19 

(SARS-COV-2) 

vaccine, 

unspecified                             2021 

00:00:00            Completed                               [facility]

 

                                                    COVID-19 

(SARS-COV-2) 

vaccine, 

unspecified                             COVID-19 

(SARS-COV-2) 

vaccine, 

unspecified                             2021 

00:00:00            Completed                               [facility]

 

                                                    COVID-19 

(SARS-COV-2) 

vaccine, 

unspecified                             COVID-19 

(SARS-COV-2) 

vaccine, 

unspecified                             2021 

00:00:00            Completed                               [facility]

 

                                                    COVID-19 

(SARS-COV-2) 

vaccine, 

unspecified                             COVID-19 

(SARS-COV-2) 

vaccine, 

unspecified                             2021 

00:00:00            Completed                               [facility]

 

                                                    COVID-19 

(SARS-COV-2) 

vaccine, 

unspecified                             COVID-19 

(SARS-COV-2) 

vaccine, 

unspecified                             2021 

00:00:00            Completed                               [facility]

 

                                                    COVID-19 

(SARS-COV-2) 

vaccine, 

unspecified                             COVID-19 

(SARS-COV-2) 

vaccine, 

unspecified                             2021 

00:00:00            Completed                               [facility]

 

                                                    COVID-19 

(SARS-COV-2) 

vaccine, 

unspecified                             COVID-19 

(SARS-COV-2) 

vaccine, 

unspecified                             2021 

00:00:00            Completed                               [facility]

 

                                                    COVID-19 

(SARS-COV-2) 

vaccine, 

unspecified                             COVID-19 

(SARS-COV-2) 

vaccine, 

unspecified                             2021 

00:00:00            Completed                               [facility]

 

                                                    COVID-19 

(SARS-COV-2) 

vaccine, 

unspecified                             COVID-19 

(SARS-COV-2) 

vaccine, 

unspecified                             2021 

00:00:00            Completed                               [facility]

 

                                HEP B, Adult Dosage                 2018 

00:00:00            Completed                               [facility]

 

                                HEP B, Adult Dosage                 2018 

00:00:00            Completed                               [facility]

 

                                HEP B, Adult Dosage                 2018 

00:00:00            Completed                               [facility]

 

                                Hep B, adult    Hep B, adult    2018 

00:00:00            Completed                               [facility]

 

                                Hep B, adult    Hep B, adult    2018 

00:00:00            Completed                               [facility]

 

                                Hep B, adult    Hep B, adult    2018 

00:00:00            Completed                               [facility]

 

                                Hep B, adult    Hep B, adult    2018 

00:00:00            Completed                               [facility]

 

                                Hep B, adult    Hep B, adult    2018 

00:00:00            Completed                               [facility]

 

                                Hep B, adult    Hep B, adult    2018 

00:00:00            Completed                               [facility]

 

                                Hep B, adult    Hep B, adult    2018 

00:00:00            Completed                               [facility]

 

                                Hep B, adult    Hep B, adult    2018 

00:00:00            Completed                               [facility]

 

                                Hep B, adult    Hep B, adult    2018 

00:00:00            Completed                               [facility]

 

                                Hep B, adult    Hep B, adult    2018 

00:00:00            Completed                               [facility]

 

                                Hep B, adult    Hep B, adult    2018 

00:00:00            Completed                               [facility]

 

                                Hep B, adult    Hep B, adult    2018 

00:00:00            Completed                               [facility]

 

                                Hep B, adult    Hep B, adult    2018 

00:00:00            Completed                               [facility]

 

                                TDAP                            2018 

00:00:00            Completed                               [facility]

 

                                TDAP                            2018 

00:00:00            Completed                               [facility]

 

                                TDAP                            2018 

00:00:00            Completed                               [facility]

 

                                Tdap            Tdap            2018 

00:00:00            Completed                               [facility]

 

                                Tdap            Tdap            2018 

00:00:00            Completed                               [facility]

 

                                Tdap            Tdap            2018 

00:00:00            Completed                               [facility]

 

                                Tdap            Tdap            2018 

00:00:00            Completed                               [facility]

 

                                Tdap            Tdap            2018 

00:00:00            Completed                               [facility]

 

                                Tdap            Tdap            2018 

00:00:00            Completed                               [facility]

 

                                Tdap            Tdap            2018 

00:00:00            Completed                               [facility]

 

                                Tdap            Tdap            2018 

00:00:00            Completed                               [facility]

 

                                Tdap            Tdap            2018 

00:00:00            Completed                               [facility]

 

                                Tdap            Tdap            2018 

00:00:00            Completed                               [facility]

 

                                Tdap            Tdap            2018 

00:00:00            Completed                               [facility]

 

                                Tdap            Tdap            2018 

00:00:00            Completed                               [facility]

 

                                Tdap            Tdap            2018 

00:00:00            Completed                               [facility]

 

                                                    COVID-19, mRNA, 

LNP-S, PF, 30 

mcg/0.3 mL dose 

(Pfizer-[employer]NT[employer]) - 

ML                                      COVID-19, mRNA, 

LNP-S, PF, 30 

mcg/0.3 mL dose 

(Pfizer-BioNT[employer]) 

- ML            Unknown         Completed                       [facility]

 

                                                    COVID-19, mRNA, 

LNP-S, PF, 30 

mcg/0.3 mL dose 

(Pf[employer]) - 

ML                                      COVID-19, mRNA, 

LNP-S, PF, 30 

mcg/0.3 mL dose 

(Pfizer-BioNT[employer]) 

- ML            Unknown         Completed                       [facility]

 

                                                    COVID-19 

(SARS-COV-2) 

vaccine, 

unspecified                             COVID-19 

(SARS-COV-2) 

vaccine, 

unspecified     Unknown         Completed                       [facility]

 

                    Hep B, adult Hep B, adult Unknown   Completed           [facility]

 

                    Tdap      Tdap      Unknown   Completed           [facility]

 

                                                    COVID-19, mRNA, 

LNP-S, PF, 30 

mcg/0.3 mL dose 

(Pf[employer]) - 

ML                                      COVID-19, mRNA, 

LNP-S, PF, 30 

mcg/0.3 mL dose 

(Pf[employer]) 

- ML            Unknown         Completed                       [facility]

 

                                                    COVID-19, mRNA, 

LNP-S, PF, 30 

mcg/0.3 mL dose 

(Pf[employer]) - 

ML                                      COVID-19, mRNA, 

LNP-S, PF, 30 

mcg/0.3 mL dose 

(Pfizer-[employer]) 

- ML            Unknown         Completed                       [facility]

 

                                                    COVID-19 

(SARS-COV-2) 

vaccine, 

unspecified                             COVID-19 

(SARS-COV-2) 

vaccine, 

unspecified     Unknown         Completed                       [facility]

 

                    Hep B, adult Hep B, adult Unknown   Completed           [facility]

 

                    Tdap      Tdap      Unknown   Completed           [facility]

 

                                                    COVID-19, mRNA, 

LNP-S, PF, 30 

mcg/0.3 mL dose 

(Pf[employer]) - 

ML                                      COVID-19, mRNA, 

LNP-S, PF, 30 

mcg/0.3 mL dose 

(Pfizer-[employer]) 

- ML            Unknown         Completed                       [facility]

 

                                                    COVID-19, mRNA, 

LNP-S, PF, 30 

mcg/0.3 mL dose 

(Pf[employer]) - 

ML                                      COVID-19, mRNA, 

LNP-S, PF, 30 

mcg/0.3 mL dose 

(Pfizer-[employer]) 

- ML            Unknown         Completed                       [facility]

 

                                                    COVID-19 

(SARS-COV-2) 

vaccine, 

unspecified                             COVID-19 

(SARS-COV-2) 

vaccine, 

unspecified     Unknown         Completed                       [facility]

 

                    Hep B, adult Hep B, adult Unknown   Completed           [facility]

 

                    Tdap      Tdap      Unknown   Completed           [facility]

 

                                                    COVID-19, mRNA, 

LNP-S, PF, 30 

mcg/0.3 mL dose 

(Pfizer-[employer]) - 

ML                                      COVID-19, mRNA, 

LNP-S, PF, 30 

mcg/0.3 mL dose 

(Pfizer-Bio[employer]) 

- ML            Unknown         Completed                       [facility]

 

                                                    COVID-19, mRNA, 

LNP-S, PF, 30 

mcg/0.3 mL dose 

(Pfizer-[employer]) - 

ML                                      COVID-19, mRNA, 

LNP-S, PF, 30 

mcg/0.3 mL dose 

(Pfizer-BioNT[employer]) 

- ML            Unknown         Completed                       [facility]

 

                                                    COVID-19 

(SARS-COV-2) 

vaccine, 

unspecified                             COVID-19 

(SARS-COV-2) 

vaccine, 

unspecified     Unknown         Completed                       [facility]

 

                    Hep B, adult Hep B, adult Unknown   Completed           [facility]

 

                    Tdap      Tdap      Unknown   Completed           [facility]

 

                                                    COVID-19, mRNA, 

LNP-S, PF, 30 

mcg/0.3 mL dose 

(Pf[employer]) - 

ML                                      COVID-19, mRNA, 

LNP-S, PF, 30 

mcg/0.3 mL dose 

(Pf[employer]) 

- ML            Unknown         Completed                       [facility]

 

                                                    COVID-19, mRNA, 

LNP-S, PF, 30 

mcg/0.3 mL dose 

(Pfizer-[employer]) - 

ML                                      COVID-19, mRNA, 

LNP-S, PF, 30 

mcg/0.3 mL dose 

(Pfizer-BioNT[employer]) 

- ML            Unknown         Completed                       [facility]

 

                                                    COVID-19 

(SARS-COV-2) 

vaccine, 

unspecified                             COVID-19 

(SARS-COV-2) 

vaccine, 

unspecified     Unknown         Completed                       [facility]

 

                    Hep B, adult Hep B, adult Unknown   Completed           [facility]

 

                    Tdap      Tdap      Unknown   Completed           [facility]

 

                                                    COVID-19, mRNA, 

LNP-S, PF, 30 

mcg/0.3 mL dose 

(Pf[employer]) - 

ML                                      COVID-19, mRNA, 

LNP-S, PF, 30 

mcg/0.3 mL dose 

(Pfizer-[employer]) 

- ML            Unknown         Completed                       [facility]

 

                                                    COVID-19, mRNA, 

LNP-S, PF, 30 

mcg/0.3 mL dose 

(Pfizer-[employer]) - 

ML                                      COVID-19, mRNA, 

LNP-S, PF, 30 

mcg/0.3 mL dose 

(Pfizer-Bio[employer]) 

- ML            Unknown         Completed                       [facility]

 

                                                    COVID-19 

(SARS-COV-2) 

vaccine, 

unspecified                             COVID-19 

(SARS-COV-2) 

vaccine, 

unspecified     Unknown         Completed                       [facility]

 

                    Hep B, adult Hep B, adult Unknown   Completed           [facility]

 

                    Tdap      Tdap      Unknown   Completed           [facility]

 

                                                    COVID-19, mRNA, 

LNP-S, PF, 30 

mcg/0.3 mL dose 

(Pf[employer]) - 

ML                                      COVID-19, mRNA, 

LNP-S, PF, 30 

mcg/0.3 mL dose 

(Pfizer-BioNT[employer]) 

- ML            Unknown         Completed                       [facility]

 

                                                    COVID-19, mRNA, 

LNP-S, PF, 30 

mcg/0.3 mL dose 

(Pfizer-BioNTech) - 

ML                                      COVID-19, mRNA, 

LNP-S, PF, 30 

mcg/0.3 mL dose 

(Pfizer-BioNTech) 

- ML            Unknown         Completed                       [facility]

 

                                                    COVID-19 

(SARS-COV-2) 

vaccine, 

unspecified                             COVID-19 

(SARS-COV-2) 

vaccine, 

unspecified     Unknown         Completed                       [facility]

 

                    Hep B, adult Hep B, adult Unknown   Completed           [facility]

 

                    Tdap      Tdap      Unknown   Completed           [facility]







Vital Signs





                      Vital Name Observation Time Observation Value Comments   S

ource

 

                      Body Weight 2023 00:00:00 240.3 [lb_av]            [facility]

[facility]

 

                                                    BMI (Body Mass 

Index)          2023 00:00:00 42.6 kg/m2                      [facility]

 

                      BP Systolic 2023 00:00:00 149 mm[Hg]            [facility]

 

                      BP Diastolic 2023 00:00:00 96 mm[Hg]             [facility]

 

                      Height     2023 00:00:00 63 [in_i]             [facility]

 

                      BP Systolic 2023 00:00:00 127 mm[Hg]            [facility]

 

                      Body Weight 2023 00:00:00 234.5 [lb_av]            [facility]

[facility]

 

                      BP Diastolic 2023 00:00:00 90 mm[Hg]             [facility]

 

                                                    BMI (Body Mass 

Index)          2023 00:00:00 41.5 kg/m2                      [facility]

 

                      Height     2023 00:00:00 63 [in_i]             [facility]

 

                                                    BMI (Body Mass 

Index)          2023 00:00:00 41.9 kg/m2                      [facility]

 

                      Body Weight 2023 00:00:00 236.3 [lb_av]            [facility]

[facility]

 

                      BP Systolic 2023 00:00:00 178 mm[Hg]            [facility]

 

                      Height     2023 00:00:00 63 [in_i]             [facility]

 

                      BP Diastolic 2023 00:00:00 104 mm[Hg]            [facility]

 

                                                    BMI (Body Mass 

Index)          2023-10-16 00:00:00 40.9 kg/m2                      [facility]

 

                      BP Systolic 2023-10-16 00:00:00 135 mm[Hg]            [facility]

 

                      Body Weight 2023-10-16 00:00:00 231 [lb_av]            [facility]

 

                      Height     2023-10-16 00:00:00 63 [in_i]             [facility]

 

                      BP Diastolic 2023-10-16 00:00:00 91 mm[Hg]             [facility]

 

                      BP Diastolic 2023 00:00:00 105 mm[Hg]            [facility]

 

                      BP Systolic 2023 00:00:00 151 mm[Hg]            [facility]

 

                                                    BMI (Body Mass 

Index)          2023 00:00:00 43.8 kg/m2                      [facility]

 

                      Body Weight 2023 00:00:00 247 [lb_av]            [facility]

 

                      Height     2023 00:00:00 63 [in_i]             [facility]

 

                                                    BMI (Body Mass 

Index)          2023 00:00:00 43.6 kg/m2                      [facility]

 

                      BP Diastolic 2023 00:00:00 81 mm[Hg]             [facility]

 

                      Body Weight 2023 00:00:00 246 [lb_av]            [facility]

 

                      Height     2023 00:00:00 63 [in_i]             [facility]

 

                      BP Systolic 2023 00:00:00 132 mm[Hg]            [facility]

 

                      Body Weight 2023 00:00:00 243 [lb_av]            [facility]

 

                                                    BMI (Body Mass 

Index)          2023 00:00:00 43 kg/m2                        [facility]

 

                      Height     2023 00:00:00 63 [in_i]             [facility]

 

                      BP Systolic 2023 00:00:00 131 mm[Hg]            [facility]

 

                      BP Diastolic 2023 00:00:00 89 mm[Hg]             [facility]

 

                      BP Diastolic 2023 00:00:00 91 mm[Hg]             [facility]

 

                      Height     2023 00:00:00 63 [in_i]             [facility]

 

                      Body Weight 2023 00:00:00 244 [lb_av]            [facility]

 

                                                    BMI (Body Mass 

Index)          2023 00:00:00 43.2 kg/m2                      [facility]

 

                      BP Systolic 2023 00:00:00 141 mm[Hg]            [facility]

 

                      BP Systolic 2023 00:00:00 160 mm[Hg]            [facility]

 

                                                    BMI (Body Mass 

Index)          2023 00:00:00 43.5 kg/m2                      [facility]

 

                      Body Weight 2023 00:00:00 245.7 [lb_av]            [facility]

[facility]

 

                      BP Diastolic 2023 00:00:00 108 mm[Hg]            [facility]

 

                      Height     2023 00:00:00 63 [in_i]             [facility]

 

                                                    BMI (Body Mass 

Index)          2023 00:00:00 43 kg/m2                        [facility]

 

                      BP Systolic 2023 00:00:00 136 mm[Hg]            [facility]

 

                      Height     2023 00:00:00 63 [in_i]             [facility]

 

                      Body Weight 2023 00:00:00 242.8 [lb_av]            [facility]

 

                      BP Diastolic 2023 00:00:00 98 mm[Hg]             [facility]

 

                      Heart rate 2023 03:30:00 100 /min              [facility]

 

                                                    Oxygen saturation in 

Arterial blood by 

Pulse oximetry  2023 03:30:00 94 /min                         [facility]

 

                                                    Systolic blood 

pressure        2023 03:13:00 112 mm[Hg]                      [facility]

 

                                                    Diastolic blood 

pressure        2023 03:13:00 69 mm[Hg]                       [facility]

 

                      Body temperature 2023 02:21:00 36.89 [place]            

 [facility]

 

                      Respiratory rate 2023 02:21:00 18 /min              

 [facility]

 

                      Body height 2023 02:21:00 157.5 cm              [facility]

 

                      Body weight 2023 02:21:00 112.038 kg            [facility]

 

                      BMI        2023 02:21:00 45.18 kg/m2            [facility]

 

                                                    BMI (Body Mass 

Index)          2023 00:00:00 44.1 kg/m2                      [facility]

 

                      BP Diastolic 2023 00:00:00 91 mm[Hg]             [facility]

 

                      Body Weight 2023 00:00:00 249.2 [lb_av]            [facility]

 

                      Height     2023 00:00:00 63 [in_i]             [facility]

 

                      BP Systolic 2023 00:00:00 153 mm[Hg]            [facility]

 

                      BP Diastolic 2023 00:00:00 90 mm[Hg]             [facility]

 

                      Height     2023 00:00:00 63 [in_i]             [facility]

 

                      BP Systolic 2023 00:00:00 147 mm[Hg]            [facility]

 

                      Body Weight 2023 00:00:00 253.2 [lb_av]            [facility]

 

                      BP Diastolic 2023 00:00:00 81 mm[Hg]             [facility]

 

                      Height     2023 00:00:00 63 [in_i]             [facility]

 

                                                    BMI (Body Mass 

Index)          2023 00:00:00 44.9 kg/m2                      [facility]

 

                      BP Systolic 2023 00:00:00 131 mm[Hg]            [facility]

 

                      Body Weight 2023 00:00:00 253.7 [lb_av]            [facility]

 

                      BP Diastolic 2023 00:00:00 87 mm[Hg]             [facility]

 

                      Height     2023 00:00:00 63 [in_i]             [facility]

 

                                                    BMI (Body Mass 

Index)          2023 00:00:00 45.2 kg/m2                      [facility]

 

                      BP Systolic 2023 00:00:00 139 mm[Hg]            [facility]

 

                      Body Weight 2023 00:00:00 255 [lb_av]            [facility]

 

                      BP Diastolic 2023 00:00:00 78 mm[Hg]             [facility]

 

                      Height     2023 00:00:00 63 [in_i]             [facility]

 

                                                    BMI (Body Mass 

Index)          2023 00:00:00 46.8 kg/m2                      [facility]

 

                      BP Systolic 2023 00:00:00 125 mm[Hg]            [facility]

 

                      Body Weight 2023 00:00:00 264.1 [lb_av]            [facility]

 

                      BP Diastolic 2023 00:00:00 83 mm[Hg]             [facility]

 

                      Height     2023 00:00:00 63 [in_i]             [facility]

 

                                                    BMI (Body Mass 

Index)          2023 00:00:00 45.8 kg/m2                      [facility]

 

                      BP Systolic 2023 00:00:00 146 mm[Hg]            [facility]

 

                      Body Weight 2023 00:00:00 258.4 [lb_av]            [facility]

 

                      BP Diastolic 2023 00:00:00 83 mm[Hg]             [facility]

 

                      Height     2023 00:00:00 63 [in_i]             [facility]

 

                                                    BMI (Body Mass 

Index)          2023 00:00:00 47.7 kg/m2                      [facility]

 

                      BP Systolic 2023 00:00:00 136 mm[Hg]            [facility]

 

                      Body Weight 2023 00:00:00 269.5 [lb_av]            [facility]

 

                      Height     2023 00:00:00 63 [in_i]             [facility]

 

                                                    BMI (Body Mass 

Index)          2023 00:00:00 47.6 kg/m2                      [facility]

 

                      BP Systolic 2023 00:00:00 140 mm[Hg]            [facility]

 

                      Body Weight 2023 00:00:00 268.5 [lb_av]            [facility]

 

                      BP Diastolic 2023 00:00:00 94 mm[Hg]             [facility]

 

                                                    Systolic blood 

pressure        2021 02:00:00 142 mm[Hg]                      [facility]

 

                                                    Diastolic blood 

pressure        2021 02:00:00 101 mm[Hg]                      [facility]

 

                      Heart rate 2021 02:00:00 113 /min              [facility]

 

                      Body temperature 2021 02:00:00 37.61 Nubia            

 [facility]

 

                      Body height 2021 02:00:00 160 cm                [facility]

 

                      Body weight 2021 02:00:00 122.046 kg            [facility]

 

                      BMI        2021 02:00:00 47.66 kg/m2            [facility]

 

                                                    Oxygen saturation in 

Arterial blood by 

Pulse oximetry  2021 02:00:00 96 /min                         [facility]

 

                      BP Diastolic 2021-07-15 00:00:00 99 mm[Hg]             [facility]

 

                      Height     2021-07-15 00:00:00 63 [in_i]             [facility]

 

                                                    BMI (Body Mass 

Index)          2021-07-15 00:00:00 46.1 kg/m2                      [facility]

 

                      BP Systolic 2021-07-15 00:00:00 150 mm[Hg]            [facility]

 

                      Body Weight 2021-07-15 00:00:00 4168 [oz_av]            [facility]







Procedures





                                        Procedure           Date / Time 

Performed                 Performing Clinician      Source

 

                          Tubal Ligation (Surg) 2023-11-15 00:00:00             

 [facility]

 

                                                    US(FBP)W/0 NON STRESS 

TEST                2023 00:00:00                     [facility]

 

                                                    US(FBP)W/0 NON STRESS 

TEST                2023 00:00:00                     [facility]

 

                                                    US(FBP)W/0 NON STRESS 

TEST                2023 00:00:00                     [facility]

 

                                                    US(FBP)W/0 NON STRESS 

TEST                2023 00:00:00                     [facility]

 

                          US, obstetric, limited 2023 00:00:00            

  [facility]

 

                                                    US(FBP)W/0 NON STRESS 

TEST                2023 00:00:00                     [facility]

 

                                                    US(FBP)W/0 NON STRESS 

TEST                2023 00:00:00                     [facility]

 

                          POCT GLUCOSE (AUTOMATED) 2023 03:30:00 Sandy Corcoran [facility]

 

                                ASSIGNMENT OF BENEFITS 2023 02:11:59 Docto

r Unassigned, 

No Name                                 [facility]

 

                                                    NOTICE OF PRIVACY 

PRACTICES                 2023 02:11:34       Doctor Unassigned, 

No Name                                 [facility]

 

                                                    CONSENT/REFUSAL FOR 

DIAGNOSIS AND TREATMENT   2023 02:11:11       Doctor Unassigned, 

No Name                                 [facility]

 

                          US, obstetric, limited 2023 00:00:00            

  [facility]

 

                          ULTRASOUND REPEAT 2023 00:00:00              [facility]

 

                                                    ULTRASOUND, PREGNANT 

UTERUS REAL TIME WITH 

IMAGE DOC, FETAL AND 

MATERNAL EVAL PLUS 

DETAILED FETAL ANATOMIC 

EXAMINATION, 

TRANSABDOMINAL APPROACH; 

SINGLE OR FIRST GESTATION 2023 00:00:00                     [facility]

 

                          US, obstetric, limited 2023 00:00:00            

  [facility]

 

                          US, obstetric, limited 2023 00:00:00            

  [facility]

 

                          US, obstetric, limited 2023 00:00:00            

  [facility]

 

                                                    ULTRASOUND, PREGNANT 

UTERUS REAL TIME WITH 

IMAGE DOCUMENTAITON, 

TRANSVAGINAL        2023 00:00:00                     [facility]

 

                          I&d Abscess Subfascial 2017 00:00:00            

  [facility]

 

                          Dilation and Curettage 2009 00:00:00            

  [facility]

 

                          Dilation and Curettage 2004 00:00:00            

  [facility]

 

                          Dilation and Curettage 1999 00:00:00            

  [facility]







Plan of Care





                      Planned Activity Planned Date Details    Comments   Source

 

                                                    Diagnostic Test 

Pending                                 2023 

00:00:00                                urinalysis, 

dipstick [code = 

urinalysis, 

dipstick]                                           [facility]

 

                      Instructions                                  [facility]







Encounters





                                                    Start 

Date/Time                               End 

Date/Time                               Encounter 

Type                                    Admission 

Type                                    Attending 

[facility] 

Facility                                Care 

Department                              Encounter 

ID                                      Source

 

                                                    2023 

00:00:00                        Inpatient       DAMIAN BUNCH             [facility]                [id number]

[id number]                               [facility]

 

                                                    2023 

23:29:53         Outpatient X               [facility]    KAREN     [id number] [facility]

 

                                                    2024 

00:00:00                                2024 

00:00:00   Outpatient            G_Pappas   MMG        MMG        [id number]

0103                                    [facility]

 

Medical 

[facility]

 

                                                    2023 

00:00:00                                2023 

00:00:00                                Damian Bell MD: [address] [place] [address], 

[place] [id number]

, Ph. [phone] 

[phone]                                        MMG             [facility] 

[place] [facility] 

[facility]                     [id number]                  [facility]

 

Medical 

[facility]

 

                                                    2023 

00:00:00                                2023 

00:00:00   Outpatient            G_Pappas   MMG        MMG        [id number]

1207                                    [facility]

[facility] 

[facility] 

[facility]

 

                                                    2023 

00:00:00                                2023 

00:00:00   Outpatient            G_Pappas   MMG        MMG        [id number]

1208                                    [facility]

[facility] 

[facility] 

[facility]

 

                                                    2023 

00:00:00                                2023 

00:00:00   Outpatient            G_Pappas   MMG        MMG        [id number]

1129                                    [facility]

 

Medical 

[facility]

 

                                                    2023 

00:00:00                                2023 

00:00:00                                Damian Bell MD: [address] [place] [address], 

[address], 

[place] 

[address]

, Ph. [phone] 

[phone]                                        MMG             [facility] 

[place] [facility] 

OB[facility]N                     [id number]                  [facility]

 

Medical 

[facility]

 

                                                    2023 

00:00:00                                2023 

00:00:00   Outpatient            G_Pappas   MMG        MMG        [id number]

1127                                    [facility]

 

Medical 

Group

 

                                                    2023-11-15 

06:00:00                                2023-11-15 

06:00:00            Outpatient          ESPERANZA BELL 

DAMIAN             [facility]                [id number]

[id number]                               [facility]

 

                                                    2023 

00:00:00                                2023 

00:00:00                                Damian Bell MD: [address] 

[facility], 

[address], [place] 

[address]

, Ph. [phone] 

[phone]                                        MMG             [facility] 

[facility]                     [id number]                  [facility]

[facility]

 

                                                    2023-10-26 

00:00:00                                2023-10-26 

00:00:00   Outpatient            G_Pappas   MMG        MMG        [id number]

1113                                    [facility]

[facility] 

Group

 

                                                    2023-10-21 

00:00:00                                2023-10-21 

00:00:00   Outpatient            G_Pappas   MMG        MMG        [id number]

1021                                    [facility]

[facility]

 

                                                    2023-10-16 

11:03:00                                2023-10-16 

11:03:00            Outpatient          ESPERANZA BELLDAMIAN             [facility]                [id number]

[id number]                               [facility]

 

                                                    2023-10-16 

00:00:00                                2023-10-16 

00:00:00   Outpatient            G_Pappas   MMG        MMG        [id number]

1016                                    [facility]

[facility]

 

                                                    2023-10-16 

00:00:00                                2023-10-16 

00:00:00                                Damian Bell MD: [address] 

[facility], 

[address], 

[place] 

[address]

, Ph. [phone] 

[phone]                                        MMG             [facility] 

[facility]                     [id number]                  [facility]

[facility]

 

                                                    2023-10-02 

04:46:00                                2023-10-03 

20:25:00            Inpatient           ESPERANZA BELLDAMIAN             [facility]                 [id number]

[id number]                               [facility]

 

                                                    2023 

00:00:2023 

00:00:00   Outpatient            G_Pappas   MMG        MMG        [id number]                                    [facility]

 

Medical 

Group

 

                                                    2023 

00:00:00                                2023 

00:00:00                                Damian Bell MD: [address] 

[address], 

[address], 

[place] 

[address]

, Ph. [phone] 

[phone]                                        MMG             [facility] 

[place] [facility] 

OBGYN                     [id number]                  [facility]

 

                                                    2023 

00:00:00                                2023 

00:00:00                                Damian Bell MD: [address] 

[facility], 

[address], 

[place] 

[address]

, Ph. [phone] 

[phone]                                        MMG             [facility] 

[place] [facility] 

OB[facility]N                     [id number]                  [facility]

 

                                                    2023 

00:00:00                                2023 

00:00:00   Outpatient            G_Pappas   MMG        MMG        925                                    [facility]

 

                                                    2023 

00:00:00                                2023 

00:00:00   Outpatient            G_Pappas   MMG        MMG        [id number]                                    [facility] 

Group

 

                                                    2023 

00:00:00                                2023 

00:00:00                                Damian Bell MD: [address] 

[facility], 

[address], 

[place] 

[address]

, Ph. [phone] 

[phone]                                        MMG             [facility] 

[facility]                     [id number]                  [facility]

 

                                                    2023 

09:42:00                                2023 

09:42:00            Outpatient          DAMIAN BUNCH             [facility]                [id number]

[id number]                               [facility]

 

                                                    2023 

00:00:00                                2023 

00:00:00                                Damian Bell MD: [address] 

[facility], 

[address], 

[place] 

[address]

, Ph. [phone] 

[phone]                                        MMG             [facility] 

[facility]                     [id number]                  [facility]

 

                                                    2023 

00:00:00                                2023 

00:00:00   Outpatient            G_Pappas   MMG        MM        

[id number]                                    [facility]

[facility] 

Group

 

                                                    2023 

00:00:00                                2023 

00:00:00   Outpatient            G_Pappas   MMG        MMG        

0918                                    [facility] 

[facility] 

[facility]

 

                                                    2023 

00:00:00                                2023 

00:00:00                                Damian Bell MD: [address] 

[facility], 

[address], 

[place] 

[address]

, Ph. [phone] 

[phone]                                        MMG             [facility] 

[place] [facility] 

[facility]N                     [id number]                  [facility]

[facility] 

Group

 

                                                    2023 

00:00:00                                2023 

00:00:00   Outpatient            G_Pappas   MMG        [facility]        

[id number]                                    [facility]

[facility]

 

                                                    2023 

00:00:00                                2023 

00:00:00                                Damian Bell MD: [address] 

[facility], 

[address], 

[place] 

[address]

, Ph. [phone] 

[phone]                                        MMG             [facility] 

[place] [facility] 

OB[facility]N                     [id number]                  [facility]

 

                                                    2023 

21:24:00                                2023 

22:56:00  Outpatient X         SANDY CORCORAN [facility]      KAREN       [id number] [facility]

 

                                                    2023 

21:24:00                                2023 

22:56:00            Emergency                               Sandy Corcoran 

[facility]                                  [id number]

[id number]

[id number]

[id number]

[id number]                       [id number]                 [facility]

 

                                                    2023 

00:00:00                                2023 

00:00:00                                Orders 

Only                                                Doctor 

Unassigned, 

No Name                                 [facility]                                [id number]

[id number]

[id number]

[phone]

[id number]                       [id number]                 [facility]

 

                                                    2023 

00:00:00                                2023 

00:00:00   Outpatient            G_Pappas   MMG        MMG        

[id number]                                    [facility]

[facility] 

Group

 

                                                    2023 

00:00:00                                2023 

00:00:00   Outpatient            G_Pappas   MMG        MMG        

[id number]                                    [facility]

[facility]

 

                                                    2023 

00:00:00                                2023 

00:00:00                                Damian Bell MD: [address] 

[facility], 

[address], 

[place] 

[address]

, Ph. [phone] 

[phone]                                        MMG             [facility] 

OBGYN                     [id number]                  [facility]

 

                                                    2023 

04:15:00                                2023 

07:48:00        emergency                                       [id number]

[id number]

[id number]

[id number]                               [id number]

[id number]

[id number]

[id number]                                     [id number]

[id number]                                      

 

                                                    2023 

04:15:00                                2023 

07:48:00            Emergency           ESPERANZA ROBERT 

DINO             [facility]                [id number]

[id number]                               [facility]

 

                                                    2023 

00:00:00                                2023 

00:00:00   Outpatient            G_Pappas   MMG        MMG        

[id number]                                    [facility]

 

                                                    2023 

09:35:00                                2023 

09:35:00            Outpatient          DAMIAN BUNCH             [facility]                [id number]

[id number]                               [facility]

 

                                                    2023 

00:00:00                                2023 

00:00:00                                Damian Bell MD: [address] [place] [address], 

[address], 

[place] 

[address]

, Ph. [phone] 

[phone]                                        MMG             [facility] 

[facility]                     [id number]                  [facility]

 

                                                    2023 

00:00:00                                2023 

00:00:00   Outpatient            G_Pappas   MMG        MMG        

06                                    [facility]

 

                                                    2023 

00:00:00                                2023 

00:00:00   Outpatient            G_Pappas   MMG        MMG        [id number]

0720                                    [facility]

[facility] 

Group

 

                                                    2023 

00:00:00                                2023 

00:00:00                                Dino Robert MD: [address] [place] [address], 

[address], 

[place] 

[address]

, Ph. [phone] 

[phone]                                        MMG             [facility] 

[facility]                     [id number]                  [facility]

[facility] 

Group

 

                                                    2023 

00:00:00                                2023 

00:00:00   Outpatient            G_Pappas   MMG        MMG        26                                    [facility]

[facility]

 

                                                    2023 

21:50:00                                2023 

00:05:00            Emergency           ER                  DINO ROBERT             [facility]                [id number]

[id number]                               [facility]

 

                                                    2023 

09:30:00                                2023 

09:30:00            Outpatient          DAMIAN BUNCH             [facility]                [id number]

[id number]                               [facility]

 

                                                    2023 

00:00:00                                2023 

00:00:00                                Damian Bell MD: [address] [place] [address], 

[address] 

[address]

, Ph. [phone] 

[phone]                                        MMG             [facility] 

[facility]                     [id number]                  [facility]

[facility] 

Group

 

                                                    2023 

00:00:00                                2023 

00:00:00   Outpatient            G_Pappas   MMG        MMG        

0531                                    [facility]

da 

[facility] 

Group

 

                                                    2023 

00:00:00                                2023 

00:00:00   Outpatient            G_Pappas   MMG        MMG        [id number]

0601                                    [facility]

[facility] 

[facility]

 

                                                    2023 

00:00:00                                2023 

00:00:00   Outpatient            G_Pappas   MMG        MMG        [id number]

0522                                    [facility]

[facility] 

[facility]

 

                                                    2023 

00:00:00                                2023 

00:00:00                                Abida 

Yolanda 

FNP-BC: 

[address] 

[facility], 

[address], 

[place] 

[address]

, Ph. [phone] 

[phone]                                        MMG             [facility] 

[facility]                     [id number]                  [facility]

 

Medical 

Group

 

                                                    2023 

00:00:00                                2023 

00:00:00   Outpatient            G_Pappas   MMG        MMG        [id number]

0417                                    [facility]

da 

[facility] 

Group

 

                                                    2023 

00:00:00                                2023 

00:00:00   Outpatient            G_Pappas   MMG        MMG        [id number]

0503                                    [facility]

[facility] 

[facility]

 

                                                    2023 

00:00:00                                2023 

00:00:00                                Damian Bell MD: [address] 

[facility], 

[address], 

[place] 

[address]

, Ph. [phone] 

[phone]                                        MMG             [facility] 

[facility]                     [id number]                  [facility]

 

Medical 

Group

 

                                                    2023 

00:00:00                                2023 

00:00:00   Outpatient            G_Pappas   MMG        MMG        [id number]

0404                                    [facility]

da 

[facility] 

Group

 

                                                    2023 

00:00:00                                2023 

00:00:00   Outpatient            G_Pappas   MMG        MMG        [id number]

0314                                    [facility] 

Group

 

                                                    2023 

00:00:00                                2023 

00:00:00                                Damian Bell MD: [address] 

[facility], 

[address], 

[place] 

[address]

, Ph. [phone] 

[phone]                                        MMG             [facility] 

[place] - 

OBGYN                     [id number]                  [facility]

da 

Medical 

Group

 

                                                    2023 

00:00:00                                2023 

00:00:00   Outpatient            G_Pappas   MMG        MMG        [id number]

0313                                    [facility]

[facility] 

[facility]

 

                                                    2023 

15:41:00                                2023 

15:41:00            Outpatient          DAMIAN BUNCH             [facility]                [id number]

[id number]                               [facility]

 

                                                    2023 

00:00:00                                2023 

00:00:00   Outpatient            [facility]osalind[facility]   [facility]        227                                    [facility]

 

                                                    2023 

00:00:00                                2023 

00:00:00                                Damian Bell MD: [address] 

[facility], 

[address], 

[place] 

[address]

, Ph. [phone] 

[phone]                                        MMG             [facility] 

OBGYN                     [id number]                  [facility]

 

                                                    2023 

00:00:00                                2023 

00:00:00   Outpatient            A_Byrd     [facility]        224                                    [facility]

 

                                                    2021 

20:00:00                                2021 

20:30:09            Outpatient          BRITTANY COLES 

NICKIE          [facility]            [id number]      [facility]

 

                                                    2021 

20:00:00                                2021 

20:30:09                                Urgent 

Care                                                Mireya Akhtar 

[facility]?BRIJESH

[facility] 

MEDICAL 

OFFICE 

BUILDING                                .2.840.114

350.1.13.10

4.2.7.2.686

[id number]

370                       [id number]                  [facility]

 

                                                    2021 

20:00:00                                2021 

20:30:09            Outpatient          BRITTANY COLES 

NICKIE          [facility]            [id number]      [facility]

 

                                                    2021 

00:00:00                                2021 

00:00:00            Telephone                               Sheri Wilson                                   [facility]

[facility] 

MEDICAL 

OFFICE 

BUILDING                                .2.840.114

350.1.13.10

4.2.7.2.686

[id number]

370                       [id number]                  [facility]

 

                                                    2021 

14:40:00                                2021 

15:47:35            Outpatient          AZUL LUCAS           [facility]            [id number]      [facility]

 

                                                    2021 

14:40:00                                2021 

15:00:00                                Urgent 

Care                                                Azul Tompkins

Unknown, 

Attending                               [facility] 

[facility] 

MEDICAL 

OFFICE 

BUILDING                                1.2.840.114

350.1.13.10

4.2.7.2.686

[id number]

370                       [id number]                  [facility]

 

                                                    2021 

14:40:00                                2021 

14:40:00            Outpatient          R                   UNKNOWN, 

ATTENDING       [facility]            [id number]      [facility]

 

                                                    2021 

11:02:00                                2021 

11:02:00   Outpatient            A[facility]Byrd     [facility]        

1117                                    [place]

da 

Medical 

[facility]

 

                                                    2021-10-28 

01:57:00                                2021-10-28 

01:57:00   Outpatient            A_Byrd     [facility]        

1028                                    [place]

da 

Medical 

[facility]

 

                                                    2021 

01:40:00                                2021 

01:40:00   Outpatient            A_Byrd     [facility]        

0923                                    [facility]

da 

Medical 

[facility]

 

                                                    2021 

00:13:00                                2021 

00:13:00            Outpatient          ESPERANZA                  EARLENE 

MEGAN               [facility]                [id number]

[id number]                               [facility]

 

                                                    2021 

01:31:00                                2021 

01:31:00   Outpatient            A_Byrd     [facility]        

0819                                    [facility]

da 

[facility]

 

                                                    2021 

07:05:00                                2021 

07:50:00            Emergency           ER                  MANUELITO DOWELL               [facility]                [id number]

[id number]                               [facility]

 

                                                    2021-07-15 

04:46:00                                2021-07-15 

04:46:00   Outpatient            A_Byrd     MMG        [facility]        15                                    [facility]

da 

[facility]

 

                                                    2021-07-15 

04:46:00                                2021-07-15 

04:46:00   Outpatient            A_Byrd     MMG        [facility]        

0717                                    [facility]

da 

Medical 

[facility]

 

                                                    2021-07-15 

00:00:00                                2021-07-15 

00:00:00                                Jerry Salazar MD: 

[address] [place] [address] 

[address], 

[place] 

[address]

, Ph. 

[phone]                                        [facility] 

[facility]                  [id number]                  [facility]

 

                                                    2021 

04:06:00                                2021 

04:06:00   Outpatient            G_Pappas   [facility]        [id number]                                    [facility]

 

                                                    2021 

00:00:00                                2021 

00:00:00            Refill                                  Doctor 

Unassigned, 

No Name                                 [facility] 

Office 

Building 

One                                     .0.114

350.1.13.10

4.2.7.2.686

[phone]

044                       [id number]                  

 

                                                    2021 

00:00:00                                2021 

00:00:00            Refill                                  Lizeth Roberts                                [facility] 

Office 

Building 

One                                     .0.114

350.1.13.10

4.2.7.2.686

[phone]

044                       [id number]                  

 

                                                    2021 

00:00:00                                2021 

00:00:00            Refill                                  Doctor 

Unassigned, 

No Name                                 [facility] 

Office 

Building 

One                                     .0.114

350.1.13.10

4.2.7.2.686

[phone]

044                       [id number]                  [facility]

 

                                                    2021 

00:00:00                                2021 

00:00:00            Refill                                  Lizeth Roberts                                [facility] 

Office 

Building 

One                                     .0.114

350.1.13.10

4.2.7.2.686

[phone]

044                       [id number]                  [facility]

 

                                                    2021 

00:00:00                                2021 

00:00:00                                Patient 

Outreach                                            Skinny Chau                                  [facility] 

PRIMARY 

CARE 

PAVILLION                               .0.114

350.1.13.10

4.2.7.2.686

[phone]

388                       [id number]                  

 

                                                    2021 

00:00:00                                2021 

00:00:00                                Patient 

Outreach                                            Skinny Chau                                  [facility] 

PRIMARY 

CARE 

PAVILLION                               1.2840.114

350.1.13.10

4.2.7.2.686

[id number]

388                       [id number]                  [facility]

 

                                                    2020 

02:19:00                                2020 

02:19:00   Outpatient            G_Pappas   MMG        MM        

[id number]                                    [facility]

 

                                                    2020 

02:19:00                                2020 

02:19:00   Outpatient            G_Pappas   MMG        MMG        

1118                                    [facility]

 

                                                    2020 

00:00:00                                2020 

00:00:00            Refill                                  AlejandraLizeth                                [facility] 

Office 

Building 

One                                     1.840.114

350.1.13.10

4.2.7.2.686

[phone]

044                       [id number]                  

 

                                                    2020 

00:00:00                                2020 

00:00:00            Refill                                  Moran 

Donovan                                 [facility] 

Office 

Building 

One                                     1.840.114

350.1.13.10

4.2.7.2.686

[phone]

044                       [id number]                  

 

                                                    2020 

00:00:00                                2020 

00:00:00            Refill                                  AlejandraLizeth tan                                [facility] 

Office 

Building 

One                                     .840.114

350.1.13.10

4.2.7.2.686

[phone]

044                       [id number]                  [facility]

 

                                                    2020 

00:00:00                                2020 

00:00:00            Refill                                  Moran, 

Donovan                                 [facility] 

Office 

Building 

One                                     1.840.114

350.1.13.10

4.2.7.2.686

[phone]

044                       [id number]                  [facility]

 

                                                    2020 

00:00:00                                2020 

00:00:00            Refill                                  Donovan Moran                                 [facility] 

Office 

Building 

One                                     1.2.840.114

350.1.13.10

4.2.7.2.686

[id number]

044                       [id number]                  

 

                                                    2020 

00:00:00                                2020 

00:00:00            Refill                                  Donovan Moran                                 [facility] 

Office 

Building 

One                                     1.2.840.114

350.1.13.10

4.2.7.2.686

[phone]

044                       [id number]                  [facility]

 

                                                    2020 

00:00:00                                2020 

00:00:00            Refill                                  Lizeth Roberts                                [facility] 

Office 

Building 

One                                     1.2840.114

350.1.13.10

4.2.7.2.686

[id number]

044                       [id number]                  

 

                                                    2020 

00:00:00                                2020 

00:00:00            Refill                                  Lizeth Roberts                                [facility] 

Office 

Building 

One                                     1.840.114

350.1.13.10

4.2.7.2.686

[phone]

044                       [id number]                  [facility]

 

                                                    2020 

13:23:59                                2020 

14:46:46                                Office 

Visit                                               Lizeth Roberts                                [facility] 

Office 

Building 

One                                     1.2840.114

350.1.13.10

4.2.7.2.686

[phone]

044                       [id number]                  

 

                                                    2020 

13:23:59                                2020 

14:46:46                                Office 

Visit                                               Lizeth Roberts                                [facility] 

Office 

Building 

One                                     1.2840.114

350.1.13.10

4.2.7.2.686

[id number]

044                       [id number]                  [facility]

 

                                                    2020 

00:00:00                                2020 

00:00:00                                Orders 

Only                                                Doctor 

Unassigned, 

No Name                                 [facility]                                1.2.840.114

350.1.13.10

4.2.7.2.686

[phone]

009                       [id number]                  [facility]

 

                                                    2019 

03:35:00                                2019 

05:09:00            Emergency           ER                  KRIS MARTINEZ              [facility]                [id number]

[id number]                               [facility]

 

                                                    2017-10-06 

09:55:00                                2017-10-06 

09:55:00   Outpatient JERRY GARCIA [facility]       [id number]

[id number]                               [facility]

 

                                                    2017 

09:27:00                                2017 

09:27:00   Outpatient JERRY GARCIA [facility]       [id number]

[id number]                               [facility]

 

                                                    2017 

14:49:00                                2017 

14:49:00   Outpatient JERRY GARCIA [facility]       [id number]

[id number]                               [facility]

 

                                                    2014 

09:54:00                                2014 

15:55:00            Inpatient           EL                  IRVINDAMIAN BENNETT             [facility]                MOB                 [id number]

[id number]                               [facility]

 

                                                    2014-01-10 

12:07:00                                2014 

03:26:00            Inpatient           EL                  IRVINDAMIAN             [facility]                MOB                 [id number]

[id number]                               [facility]

 

                                                    2013 

14:53:00                                2013 

16:50:00            Emergency           ER                  IRVINDAMIAN BENNETT             [facility]                [id number]

[id number]                               [facility]

 

                                                    2009 

04:49:00                                2009 

16:30:00            Inpatient           EL                  IRVINDAMIAN BENNETT             [facility]                MOB                 [id number]

[id number]                               [facility]

 

                                                    2009 

00:40:00                                2009 

14:05:00            Inpatient           ER                  IRVINDAMIAN BENNETT             [facility]                MOB                 [id number]

[id number]                               [facility]







Results





                    Test Description Test Time Test Comments Results   Result Co

mments Source







                                        

 

                                        

 

                                        

 

                                        

 

                                        

 

                                        

 

                                        

 

                                        

 

                                        

 

                                        

 

                                        

 

                                        

 

                                        

 

                                        





[facility] [id number] 12:03:00* 



                      Test Item  Value      Reference Range Interpretation Comme

nts

 

                      glucose (test code = glucose) 101 mg/dL             

     

 

                                                    blood urea nitrogen (test co

de = 

blood urea nitrogen) 7 mg/dL         6-20                            

 

                                                    osmolality calculated,serum 

(test 

code = osmolality 

calculated,serum) 272 mOsm/kg     280-300         L               

 

                                                    creatinine (test code = 

creatinine)     0.61 mg/dL      0.50-0.90                       

 

                                                    glomerular filtration rate (

test 

code = glomerular filtration 

rate)           > 60.00                                         

 

                                                    BUN/creatinine ratio (test c

ode = 

BUN/creatinine ratio) 11.5            12.0-20.0       L               

 

                                                    sodium level (test code = so

dium 

level)          137 mmol/L      135-145                         

 

                                                    potassium level (test code =

 

potassium level) 4.2 mmol/L      3.5-5.2                         

 

                                                    chloride level (test code = 

chloride level) 102 mmol/L                                

 

                      CO2 (test code = CO2) 26 mmol/L  21-32                 

 

                      anion gap (test code = anion gap) 13.2 mEq/L 12.0-20.0    

         

 

                                                    calcium level (test code = 

calcium level)  9.0 mg/dL       8.6-10.0                        





[facility] W Auto Differential panel - [id number] 11:30:00
  * 



                      Test Item  Value      Reference Range Interpretation Comme

[facility]

 

                                                    white blood count (test code

 = 

white blood count) 7.7 K/uL        4.0-11.5                        

 

                                                    red blood count (test code =

 red 

blood count)    4.26 M/uL       3.80-5.20                       

 

                                                    hemoglobin (test code = 

hemoglobin)     12.1 g/dL       10.5-15.7                       

 

                                                    hematocrit (test code = 

hematocrit)     37.4 %          34.0-50.0                       

 

                                                    mean corpuscular volume (kyle

t code 

= mean corpuscular volume) 87.8 fL         86.0-100.0                      

 

                                                    mean corpuscular hemoglobin 

(test 

code = mean corpuscular 

hemoglobin)     28.4 pg         26.2-33.4                       

 

                                                    mean corpuscular HGB conc (t

est 

code = mean corpuscular HGB conc) 32.4 g/dL       30.0-34.0                     

  

 

                                                    red cell distribution width 

(test 

code = red cell distribution 

width)          15.1 %          12.0-15.5                       

 

                                                    platelet count (test code = 

platelet count) 284 K/uL        165-450                         

 

                                                    mean platelet volume (test c

ode = 

mean platelet volume) 8.9 fL          9.4-12.6        L               

 

                                                    neutrophils % (test code = 

neutrophils %)  64.7 %          44.4-80.1                       

 

                      Ig% (test code = Ig%) 0.4 %      0.0-0.4               

 

                                                    lymphocyte% (test code = 

lymphocyte%)    28.2 %          10.0-50.0                       

 

                      mono % (test code = mono %) 4.2 %      3.6-12.0           

   

 

                      eos % (test code = eos %) 2.2 %      0.0-5.4              

 

 

                                                    basophil % (test code = baso

debbie 

%)              0.3 %           0.1-1.2                         

 

                                                    absolute neutrophil count (t

est 

code = absolute neutrophil count) 4.97 K/uL       1.56-6.13                     

  

 

                      Ig# (test code = Ig#) 0.03 K/uL  0.00-0.03             

 

                      lymph # (test code = lymph #) 2.16 K/uL  1.18-3.74        

     

 

                      mono # (test code = mono #) 0.32 K/uL  0.24-0.86          

   

 

                      eos # (test code = eos #) 0.17 K/uL  0.04-0.36            

 

 

                                                    basophil # (test code = baso

debbie 

#)              0.02 K/uL       0.01-0.08                       

 

                      NRBC% (test code = NRBC%) 0 /100 WBC 0-0.2                

 

 

                      NRBC# (test code = NRBC#) 0 K/uL                          

 





[facility] - [id number] 
10:16:28* 



                      Test Item  Value      Reference Range Interpretation Comme

nts

 

                      Leukocytes (test code = Leukocytes) Small                 

           

 

                      Nitrite (test code = Nitrite) negative                    

     

 

                                                    Urobilinogen (test code = 

Urobilinogen)   .2                                              

 

                      Protein (test code = Protein) Negative                    

     

 

                      pH (test code = pH) 6.0                              

 

                      Blood (test code = Blood) Negative                        

 

 

                                                    Specific Gravity (test code 

= 

Specific Gravity) 1.020                                           

 

                      Ketone (test code = Ketone) Negative                      

   

 

                      Bilirubin (test code = Bilirubin) Negative                

         

 

                      Glucose (test code = Glucose) Negative                    

     

 

                      Appearance (test code = Appearance) Clear                 

           

 

                      Color (test code = Color) Yellow                          

 





[facility], [id number] 10:11:51* 



                      Test Item  Value      Reference Range Interpretation Comme

nts

 

                                                    Pregnancy Test (test code = 

Pregnancy Test) negative                                        





[facility] 12:45:00* 



                      Test Item  Value      Reference Range Interpretation Comme

[facility]

 

                                                    Hemoglobin A1c/Hemoglobin.to

gilda in 

Blood (test code = 4548-4) 6.1 %           4.0-6.0         H               





[facility]stick[facility] - Urine2023-10-16 
09:50:43* 



                      Test Item  Value      Reference Range Interpretation Comme

nts

 

                      Leukocytes (test code = Leukocytes) Small                 

           

 

                      Nitrite (test code = Nitrite) negative                    

     

 

                                                    Urobilinogen (test code = 

Urobilinogen)   .2                                              

 

                      Protein (test code = Protein) 30                          

     

 

                      pH (test code = pH) 6.5                              

 

                      Blood (test code = Blood) Large                           

 

 

                                                    Specific Gravity (test code 

= 

Specific Gravity) 1.025                                           

 

                      Ketone (test code = Ketone) Negative                      

   

 

                      Bilirubin (test code = Bilirubin) Negative                

         

 

                      Glucose (test code = Glucose) Negative                    

     

 

                      Appearance (test code = Appearance) Clear                 

           

 

                      Color (test code = Color) Yellow                          

 





[facility] 22:43:00* 



                      Test Item  Value      Reference Range Interpretation Comme

[facility]

 

                                                    blood glucose monitoring (te

st code 

= blood glucose monitoring) 95 mg/dL                                  





[facility] 13:18:00* 



                      Test Item  Value      Reference Range Interpretation Comme

[facility]

 

                                                    blood glucose monitoring (te

st code 

= blood glucose monitoring) 107 mg/dL       70.0-110                        





[facility] 08:20:00* 



                      Test Item  Value      Reference Range Interpretation Comme

[facility]

 

                                                    blood glucose monitoring (te

st code 

= blood glucose monitoring) 76 mg/dL                                  





[facility] - Blood2023-10-03 06:27:00
  * 



                      Test Item  Value      Reference Range Interpretation Comme

[facility]

 

                                                    white blood count (test code

 = 

white blood count) 11.0 K/uL       4.0-11.5                        

 

                                                    red blood count (test code =

 red 

blood count)    3.53 M/uL       3.80-5.20       L               

 

                                                    hemoglobin (test code = 

hemoglobin)     10.2 g/dL       10.5-15.7       L               

 

                                                    hematocrit (test code = 

hematocrit)     31.0 %          34.0-50.0       L               

 

                                                    mean corpuscular volume (kyle

t code 

= mean corpuscular volume) 87.8 fL         86.0-100.0                      

 

                                                    mean corpuscular hemoglobin 

(test 

code = mean corpuscular 

hemoglobin)     28.9 pg         26.2-33.4                       

 

                                                    mean corpuscular HGB conc (t

est 

code = mean corpuscular HGB conc) 32.9 g/dL       30.0-34.0                     

  

 

                                                    red cell distribution width 

(test 

code = red cell distribution 

width)          15.2 %          12.0-15.5                       

 

                                                    platelet count (test code = 

platelet count) 190 K/uL        165-450                         

 

                                                    mean platelet volume (test c

ode = 

mean platelet volume) 10.0 fL         9.4-12.6                        

 

                                                    neutrophils % (test code = 

neutrophils %)  70.2 %          44.4-80.1                       

 

                      Ig% (test code = Ig%) 0.4 %      0.0-0.4               

 

                                                    lymphocyte% (test code = 

lymphocyte%)    21.6 %          10.0-50.0                       

 

                      mono % (test code = mono %) 7.0 %      3.6-12.0           

   

 

                      eos % (test code = eos %) 0.5 %      0.0-5.4              

 

 

                                                    basophil % (test code = baso

debbie 

%)              0.3 %           0.1-1.2                         

 

                                                    absolute neutrophil count (t

est 

code = absolute neutrophil count) 7.70 K/uL       1.56-6.13       H             

  

 

                      Ig# (test code = Ig#) 0.04 K/uL  0.00-0.03  H          

 

                      lymph # (test code = lymph #) 2.37 K/uL  1.18-3.74        

     

 

                      mono # (test code = mono #) 0.77 K/uL  0.24-0.86          

   

 

                      eos # (test code = eos #) 0.06 K/uL  0.04-0.36            

 

 

                                                    basophil # (test code = baso

debbie 

#)              0.03 K/uL       0.01-0.08                       

 

                      NRBC% (test code = NRBC%) 0 /100 WBC 0-0.2                

 

 

                      NRBC# (test code = NRBC#) 0 K/uL                          

 





[facility] 06:12:00* 



                      Test Item  Value      Reference Range Interpretation Comme

nts

 

                                                    .hepatitis B surface antigen

 (test 

code = .hepatitis B surface antigen) negative        negative                   

     





[facility] W Auto Differential panel - Blood2023-10-03 01:24:00
  * 



                      Test Item  Value      Reference Range Interpretation Comme

nts

 

                                                    white blood count (test code

 = 

white blood count) 12.5 K/uL       4.0-11.5        H               

 

                                                    red blood count (test code =

 red 

blood count)    3.46 M/uL       3.80-5.20       L               

 

                                                    hemoglobin (test code = 

hemoglobin)     9.8 g/dL        10.5-15.7       L               

 

                                                    hematocrit (test code = 

hematocrit)     30.0 %          34.0-50.0       L               

 

                                                    mean corpuscular volume (kyle

t code 

= mean corpuscular volume) 86.7 fL         86.0-100.0                      

 

                                                    mean corpuscular hemoglobin 

(test 

code = mean corpuscular 

hemoglobin)     28.3 pg         26.2-33.4                       

 

                                                    mean corpuscular HGB conc (t

est 

code = mean corpuscular HGB conc) 32.7 g/dL       30.0-34.0                     

  

 

                                                    red cell distribution width 

(test 

code = red cell distribution 

width)          15.0 %          12.0-15.5                       

 

                                                    platelet count (test code = 

platelet count) 200 K/uL        165-450                         

 

                                                    mean platelet volume (test c

ode = 

mean platelet volume) 10.6 fL         9.4-12.6                        

 

                                                    neutrophils % (test code = 

neutrophils %)  77.6 %          44.4-80.1                       

 

                      Ig% (test code = Ig%) 0.6 %      0.0-0.4    H          

 

                                                    lymphocyte% (test code = 

lymphocyte%)    15.1 %          10.0-50.0                       

 

                      mono % (test code = mono %) 6.1 %      3.6-12.0           

   

 

                      eos % (test code = eos %) 0.4 %      0.0-5.4              

 

 

                                                    basophil % (test code = baso

debbie 

%)              0.2 %           0.1-1.2                         

 

                                                    absolute neutrophil count (t

est 

code = absolute neutrophil count) 9.70 K/uL       1.56-6.13       H             

  

 

                      Ig# (test code = Ig#) 0.07 K/uL  0.00-0.03  H          

 

                      lymph # (test code = lymph #) 1.89 K/uL  1.18-3.74        

     

 

                      mono # (test code = mono #) 0.76 K/uL  0.24-0.86          

   

 

                      eos # (test code = eos #) 0.05 K/uL  0.04-0.36            

 

 

                                                    basophil # (test code = baso

debbie 

#)              0.02 K/uL       0.01-0.08                       

 

                      NRBC% (test code = NRBC%) 0 /100 WBC 0-0.2                

 

 

                      NRBC# (test code = NRBC#) 0 K/uL                          

 





[facility]interpretation[facility]23-10-03 
00:11:00* 



                      Test Item  Value      Reference Range Interpretation Comme

nts

 

                                                    antibody screen (test code =

 

antibody screen) negative                                        

 

                      blood type (test code = blood type) an                    

           

 

                      fs (test code = fs) negative                         





[facility] 22:21:00* 



                      Test Item  Value      Reference Range Interpretation Comme

nts

 

                                                    blood glucose monitoring (te

st code 

= blood glucose monitoring) 125 mg/dL       70.0-110        H               





[facility] 15:31:00* 



                      Test Item  Value      Reference Range Interpretation Comme

nts

 

                                                    blood glucose monitoring (te

st code 

= blood glucose monitoring) 71 mg/dL                                  





[facility] 13:04:00* 



                      Test Item  Value      Reference Range Interpretation Comme

nts

 

                                                    blood glucose monitoring (te

st code 

= blood glucose monitoring) 66 mg/dL                  L               





[facility] 12:38:00* 



                      Test Item  Value      Reference Range Interpretation Comme

nts

 

                      RPR (test code = RPR) nonreactive nonreactive            





[facility] 10:35:00* 



                      Test Item  Value      Reference Range Interpretation Comme

nts

 

                                                    blood glucose monitoring (te

st code 

= blood glucose monitoring) 92 mg/dL                                  





[facility] 08:23:00* 



                      Test Item  Value      Reference Range Interpretation Comme

nts

 

                                                    blood glucose monitoring (te

st code 

= blood glucose monitoring) 96 mg/dL                                  





[facility] 05:53:00* 



                      Test Item  Value      Reference Range Interpretation Comme

nts

 

                                                    Hemoglobin A1c/Hemoglobin.to

gilda in 

Blood (test code = 4548-4) 6.3 %           4.0-6.0         H               





[facility] - [facility]2023-10-02 05:34:00
  * 



                      Test Item  Value      Reference Range Interpretation Comme

nts

 

                                                    white blood count (test code

 = 

white blood count) 10.8 K/uL       4.0-11.5                        

 

                                                    red blood count (test code =

 red 

blood count)    4.08 M/uL       3.80-5.20                       

 

                                                    hemoglobin (test code = 

hemoglobin)     11.7 g/dL       10.5-15.7                       

 

                                                    hematocrit (test code = 

hematocrit)     35.2 %          34.0-50.0                       

 

                                                    mean corpuscular volume (kyle

t code 

= mean corpuscular volume) 86.3 fL         86.0-100.0                      

 

                                                    mean corpuscular hemoglobin 

(test 

code = mean corpuscular 

hemoglobin)     28.7 pg         26.2-33.4                       

 

                                                    mean corpuscular HGB conc (t

est 

code = mean corpuscular HGB conc) 33.2 g/dL       30.0-34.0                     

  

 

                                                    red cell distribution width 

(test 

code = red cell distribution 

width)          15.0 %          12.0-15.5                       

 

                                                    platelet count (test code = 

platelet count) 242 K/uL        165-450                         

 

                                                    mean platelet volume (test c

ode = 

mean platelet volume) 10.5 fL         9.4-12.6                        

 

                                                    neutrophils % (test code = 

neutrophils %)  66.2 %          44.4-80.1                       

 

                      Ig% (test code = Ig%) 0.8 %      0.0-0.4    H          

 

                                                    lymphocyte% (test code = 

lymphocyte%)    24.9 %          10.0-50.0                       

 

                      mono % (test code = mono %) 6.9 %      3.6-12.0           

   

 

                      eos % (test code = eos %) 0.9 %      0.0-5.4              

 

 

                                                    basophil % (test code = baso

debbie 

%)              0.3 %           0.1-1.2                         

 

                                                    absolute neutrophil count (t

est 

code = absolute neutrophil count) 7.13 K/uL       1.56-6.13       H             

  

 

                      Ig# (test code = Ig#) 0.09 K/uL  0.00-0.03  H          

 

                      lymph # (test code = lymph #) 2.68 K/uL  1.18-3.74        

     

 

                      mono # (test code = mono #) 0.74 K/uL  0.24-0.86          

   

 

                      eos # (test code = eos #) 0.10 K/uL  0.04-0.36            

 

 

                                                    basophil # (test code = baso

debbie 

#)              0.03 K/uL       0.01-0.08                       

 

                      NRBC% (test code = NRBC%) 0 /100 WBC 0-0.2                

 

 

                      NRBC# (test code = NRBC#) 0 K/uL                          

 





[facility] 



                      Test Item  Value      Reference Range Interpretation Comme

nts

 

                                                    blood glucose monitoring (te

st code 

= blood glucose monitoring) 100 mg/dL       70.0-110                        





[facility] 05[facility]22:[facility]* 



                      Test Item  Value      Reference Range Interpretation Comme

nts

 

                                                    antibody screen (test code =

 

antibody screen) negative                                        

 

                      blood type (test code = blood type) an                    

           





[facility] Capillary [id number] 
15:48:58* 



                      Test Item  Value      Reference Range Interpretation Comme

nts

 

                      GLU (test code = GLU) 114                              





[facility]dipstick[facility] - [id number] 
15:48:49* 



                      Test Item  Value      Reference Range Interpretation Comme

nts

 

                                                    Leukocytes (test code = 

Leukocytes)     Negative                                        

 

                      Nitrite (test code = Nitrite) negative                    

     

 

                                                    Urobilinogen (test code = 

Urobilinogen)   .2                                              

 

                      Protein (test code = Protein) 30                          

     

 

                      pH (test code = pH) 6.0                              

 

                      Blood (test code = Blood) Negative                        

 

 

                                                    Specific Gravity (test code 

= 

Specific Gravity) 1.030                                           

 

                      Ketone (test code = Ketone) Negative                      

   

 

                      Bilirubin (test code = Bilirubin) Negative                

         

 

                      Glucose (test code = Glucose) 500                         

     

 

                                                    Appearance (test code = 

Appearance)     Clear                                           

 

                      Color (test code = Color) Pale Yellow                     

  





[facility] - [id number] 
15:58:40* 



                      Test Item  Value      Reference Range Interpretation Comme

nts

 

                      Leukocytes (test code = Leukocytes) Negative              

           

 

                      Nitrite (test code = Nitrite) negative                    

     

 

                      Protein (test code = Protein) 30                          

     

 

                      pH (test code = pH) 7.0                              

 

                      Blood (test code = Blood) Negative                        

 

 

                                                    Specific Gravity (test code 

= 

Specific Gravity) 1.020                                           

 

                      Ketone (test code = Ketone) Negative                      

   

 

                      Bilirubin (test code = Bilirubin) Negative                

         

 

                      Glucose (test code = Glucose) 100                         

     





[facility] [Mass/volume] in Capillary [id number] 
14:44:37* 



                      Test Item  Value      Reference Range Interpretation Comme

nts

 

                      GLU (test code = GLU) 121                              





[facility] [Mass/volume] in Capillary [id number] 
16:37:02* 



                      Test Item  Value      Reference Range Interpretation Comme

nts

 

                      GLU (test code = GLU) 130                              





[facility]stick[facility] - [id number] 
16:32:23* 



                      Test Item  Value      Reference Range Interpretation Comme

nts

 

                                                    Leukocytes (test code = 

Leukocytes)     Negative                                        

 

                      Nitrite (test code = Nitrite) negative                    

     

 

                                                    Urobilinogen (test code = 

Urobilinogen)   .2                                              

 

                      Protein (test code = Protein) 30                          

     

 

                      pH (test code = pH) 6.0                              

 

                      Blood (test code = Blood) Negative                        

 

 

                                                    Specific Gravity (test code 

= 

Specific Gravity) 1.030                                           

 

                      Ketone (test code = Ketone) Negative                      

   

 

                      Bilirubin (test code = Bilirubin) Negative                

         

 

                      Glucose (test code = Glucose) 250                         

     

 

                                                    Appearance (test code = 

Appearance)     Clear                                           

 

                      Color (test code = Color) Dark Yellow                     

  





[facility]stick[facility] - [id number] 
15:59:45* 



                      Test Item  Value      Reference Range Interpretation Comme

nts

 

                      Leukocytes (test code = Leukocytes) Negative              

           

 

                      Nitrite (test code = Nitrite) negative                    

     

 

                                                    Urobilinogen (test code = 

Urobilinogen)   .2                                              

 

                      Protein (test code = Protein) 100                         

     

 

                      pH (test code = pH) 5.5                              

 

                      Blood (test code = Blood) Negative                        

 

 

                                                    Specific Gravity (test code 

= 

Specific Gravity) 1.030                                           

 

                      Ketone (test code = Ketone) Trace                         

   

 

                      Bilirubin (test code = Bilirubin) Negative                

         

 

                      Glucose (test code = Glucose) 100                         

     

 

                      Appearance (test code = Appearance) Clear                 

           

 

                      Color (test code = Color) Yellow                          

 





[facility] [Mass/volume] in Capillary [id number] 
15:58:40* 



                      Test Item  Value      Reference Range Interpretation Comme

nts

 

                      GLU (test code = GLU) 117                              





[facility]Creatinine renal clearance in 24 hour Urine and Serum or 
[id number] 10:51:00* 



                      Test Item  Value      Reference Range Interpretation Comme

nts

 

                                                    creatinine (test code = 

creatinine)     0.56 mg/dL      0.50-0.90                       

 

                                                    urine collection time (test 

code = urine collection time) 24 hours                                        

 

                                                    total volume, urine (test co

de 

= total volume, urine) 1650 mL/24 HR                                   

 

                                                    creatinine, urine random (te

st 

code = creatinine, urine 

random)         87.3 mg/dL                                

 

                                                    creatinine clearance, urine 

(test code = creatinine 

clearance, urine) 178.6 mL/min              H               





[facility] [id number] 10:50:00* 



                      Test Item  Value      Reference Range Interpretation Comme

nts

 

                      uric acid (test code = uric acid) 3.4 mg/dL  2.4-5.7      

         





[facility]Com[facility] 2000 [facility] - Serum or Plasma
2023 10:50:00* 



                      Test Item  Value      Reference Range Interpretation Comme

nts

 

                      glucose (test code = glucose) 121 mg/dL       H     

     

 

                                                    blood urea nitrogen (test co

de = 

blood urea nitrogen) 9 mg/dL         6-20                            

 

                                                    osmolality calculated,serum 

(test 

code = osmolality 

calculated,serum) 272 mOsm/kg     280-300         L               

 

                                                    creatinine (test code = 

creatinine)     0.56 mg/dL      0.50-0.90                       

 

                                                    glomerular filtration rate (

test 

code = glomerular filtration 

rate)           > 60.00                                         

 

                                                    BUN/creatinine ratio (test c

ode = 

BUN/creatinine ratio) 16.1            12.0-20.0                       

 

                                                    sodium level (test code = so

dium 

level)          136 mmol/L      135-145                         

 

                                                    potassium level (test code =

 

potassium level) 3.8 mmol/L      3.5-5.2                         

 

                                                    chloride level (test code = 

chloride level) 103 mmol/L                                

 

                      CO2 (test code = CO2) 23 mmol/L  21-32                 

 

                      anion gap (test code = anion gap) 13.8 mEq/L 12.0-20.0    

         

 

                                                    calcium level (test code = 

calcium level)  9.3 mg/dL       8.6-10.0                        

 

                                                    total protein (test code = t

otal 

protein)        7.3 g/dL        6.6-8.7                         

 

                      albumin (test code = albumin) 3.5 g/dL   3.5-5.2          

     

 

                      globulin (test code = globulin) 3.8 g/dL   1.5-4.5        

       

 

                      A/G ratio (test code = A/G ratio) 0.9        >1.0         

         

 

                                                    bilirubin,total (test code =

 

bilirubin,total) < 0.2           0.0-1.2                         

 

                      AST/SGOT (test code = AST/SGOT) 12 U/L     15-32      L   

       

 

                      ALT/SGPT (test code = ALT/SGPT) 10 U/L     0-33           

       

 

                                                    alkaline phosphatase, total 

(test 

code = alkaline phosphatase, 

total)          121 U/L                   H               





[facility] GroupUrate [Mass/volume] in Serum or [id number] 10:50:00
  * 



                      Test Item  Value      Reference Range Interpretation Comme

nts

 

                      uric acid (test code = uric acid) 3.4 mg/dL  2.4-5.7      

         





[facility]ptotal [facility] [id number] 10:37:00* 



                      Test Item  Value      Reference Range Interpretation Comme

nts

 

                                                    urine total protein (test co

de = 

urine total protein) 20.9 mg/dL                                      

 

                                                    total protein 24 hour urine 

(test code = total protein 24 

hour urine)     344.8 mg/day    <149.1                          





[facility] GroupProtein [Mass/time] in 24 hour [id number] 10:37:00* 



                      Test Item  Value      Reference Range Interpretation Comme

nts

 

                                                    urine total protein (test co

de = 

urine total protein) 20.9 mg/dL                                      

 

                                                    total protein 24 hour urine 

(test code = total protein 24 

hour urine)     344.8 mg/day    <149.1                          





[facility] Auto Differential panel - [id number] 10:24:00
  * 



                      Test Item  Value      Reference Range Interpretation Comme

nts

 

                                                    white blood count (test code

 = 

white blood count) 10.7 K/uL       4.0-11.5                        

 

                                                    red blood count (test code =

 red 

blood count)    4.34 M/uL       3.80-5.20                       

 

                                                    hemoglobin (test code = 

hemoglobin)     12.2 g/dL       10.5-15.7                       

 

                                                    hematocrit (test code = 

hematocrit)     37.6 %          34.0-50.0                       

 

                                                    mean corpuscular volume (kyle

t code 

= mean corpuscular volume) 86.6 fL         86.0-100.0                      

 

                                                    mean corpuscular hemoglobin 

(test 

code = mean corpuscular 

hemoglobin)     28.1 pg         26.2-33.4                       

 

                                                    mean corpuscular HGB conc (t

est 

code = mean corpuscular HGB conc) 32.4 g/dL       30.0-34.0                     

  

 

                                                    red cell distribution width 

(test 

code = red cell distribution 

width)          14.4 %          12.0-15.5                       

 

                                                    platelet count (test code = 

platelet count) 247 K/uL        165-450                         

 

                                                    mean platelet volume (test c

ode = 

mean platelet volume) 10.0 fL         9.4-12.6                        

 

                                                    neutrophils % (test code = 

neutrophils %)  67.9 %          44.4-80.1                       

 

                      Ig% (test code = Ig%) 0.9 %      0.0-0.4    H          

 

                                                    lymphocyte% (test code = 

lymphocyte%)    24.3 %          10.0-50.0                       

 

                      mono % (test code = mono %) 6.0 %      3.6-12.0           

   

 

                      eos % (test code = eos %) 0.5 %      0.0-5.4              

 

 

                                                    basophil % (test code = baso

debbie 

%)              0.4 %           0.1-1.2                         

 

                                                    absolute neutrophil count (t

est 

code = absolute neutrophil count) 7.26 K/uL       1.56-6.13       H             

  

 

                      Ig# (test code = Ig#) 0.10 K/uL  0.00-0.03  H          

 

                      lymph # (test code = lymph #) 2.59 K/uL  1.18-3.74        

     

 

                      mono # (test code = mono #) 0.64 K/uL  0.24-0.86          

   

 

                      eos # (test code = eos #) 0.05 K/uL  0.04-0.36            

 

 

                                                    basophil # (test code = baso

debbie 

#)              0.04 K/uL       0.01-0.08                       

 

                      NRBC% (test code = NRBC%) 0 /100 WBC 0-0.2                

 

 

                      NRBC# (test code = NRBC#) 0 K/uL                          

 





[facility]volume[facility] in Capillary [id number] 
11:14:58* 



                      Test Item  Value      Reference Range Interpretation Comme

nts

 

                      GLU (test code = GLU) 119                              





[facility] (dipstick) panel - [id number] 
11:14:38* 



                      Test Item  Value      Reference Range Interpretation Comme

nts

 

                      Leukocytes (test code = Leukocytes) Negative              

           

 

                      Nitrite (test code = Nitrite) negative                    

     

 

                                                    Urobilinogen (test code = 

Urobilinogen)   .2                                              

 

                      Protein (test code = Protein) 100                         

     

 

                      pH (test code = pH) 6.0                              

 

                      Blood (test code = Blood) Negative                        

 

 

                                                    Specific Gravity (test code 

= 

Specific Gravity) 1.030                                           

 

                      Ketone (test code = Ketone) Trace                         

   

 

                      Bilirubin (test code = Bilirubin) Negative                

         

 

                      Glucose (test code = Glucose) 250                         

     

 

                      Appearance (test code = Appearance) Clear                 

           

 

                      Color (test code = Color) Yellow                          

 





[facility] [id number] 



                      Test Item  Value      Reference Range Interpretation Comme

nts

 

                      CT/NG (test code = CT/NG) normal                          

 

 

                                                    trichomonas vaginalis addon 

- swab 

(test code = trichomonas vaginalis 

addon - swab)   normal                                          





[facility]rectal, streptococcus group -[id number] 
00:00:00* 



                      Test Item  Value      Reference Range Interpretation Comme

nts

 

                                                    group B strep (test code = g

roup B 

strep)          negative                                        





[facility]Mass/volume[facility] in Capillary [id number] 
11:28:16* 



                      Test Item  Value      Reference Range Interpretation Comme

nts

 

                      GLU (test code = GLU) 77                               





[facility] macro (dipstick) panel - [id number] 
11:22:45* 



                      Test Item  Value      Reference Range Interpretation Comme

nts

 

                      Leukocytes (test code = Leukocytes) Negative              

           

 

                      Nitrite (test code = Nitrite) negative                    

     

 

                                                    Urobilinogen (test code = 

Urobilinogen)   .2                                              

 

                      Protein (test code = Protein) 30                          

     

 

                      pH (test code = pH) 6.0                              

 

                      Blood (test code = Blood) Negative                        

 

 

                                                    Specific Gravity (test code 

= 

Specific Gravity) 1.030                                           

 

                      Ketone (test code = Ketone) Small                         

   

 

                      Bilirubin (test code = Bilirubin) Negative                

         

 

                      Glucose (test code = Glucose) 500                         

     

 

                      Appearance (test code = Appearance) Clear                 

           

 

                      Color (test code = Color) Yellow                          

 





[facility]20[facility] 03:31:18* 



                      Test Item  Value      Reference Range Interpretation Comme

nts

 

                      POCT GLU (test code = 4743031280) 119 mg/dL       H 

         

 

                                                    Lab Interpretation (test cod

e = 

14235-6)        Abnormal                                        





[facility]
2023 09:29:27* 



                      Test Item  Value      Reference Range Interpretation Comme

nts

 

                      Leukocytes (test code = Leukocytes) Trace                 

           

 

                      Nitrite (test code = Nitrite) negative                    

     

 

                                                    Urobilinogen (test code = 

Urobilinogen)   .2                                              

 

                      Protein (test code = Protein) Negative                    

     

 

                      pH (test code = pH) 6.5                              

 

                      Blood (test code = Blood) Negative                        

 

 

                                                    Specific Gravity (test code 

= 

Specific Gravity) 1.015                                           

 

                      Ketone (test code = Ketone) Large                         

   

 

                      Bilirubin (test code = Bilirubin) Negative                

         

 

                      Glucose (test code = Glucose) Negative                    

     

 

                      Appearance (test code = Appearance) Clear                 

           

 

                      Color (test code = Color) Yellow                          

 





[facility] [Mass/volume] in Capillary [id number] 
09:22:46* 



                      Test Item  Value      Reference Range Interpretation Comme

nts

 

                      HGB (test code = HGB) 96                               





[facility]glucose [id number] 05:07:00* 



                      Test Item  Value      Reference Range Interpretation Comme

nts

 

                                                    blood glucose monitoring (te

st code 

= blood glucose monitoring) 113 mg/dL       70.0-110        H               





[id number] 04:55:00* 



                      Test Item  Value      Reference Range Interpretation Comme

nts

 

                                                    color, urine (test code = 

color, urine)   yellow                                          

 

                                                    appearance, urine (test code

 

= appearance, urine) clear           clear                           

 

                                                    urine glucose (test code = 

urine glucose)  4+ (1000 mg/dL) negative        A               

 

                                                    bilirubin, urine (test code 

= 

bilirubin, urine) negative        negative                        

 

                                                    ketone, urine (test code = 

ketone, urine)  1+(small)       negative        A               

 

                                                    specific gravity,urine (test

 

code = specific 

gravity,urine)  1.037           1.003-1.030     H               

 

                                                    blood urine (test code = 

blood urine)    negative        negative                        

 

                                                    pH,urine (test code = 

pH,urine)       6.000           5-9                             

 

                                                    protein urine (UA) (test cod

e 

= protein urine (UA)) 1+ (30 mg/dL)   negative        A               

 

                                                    urobilinogen, urine (test 

code = urobilinogen, urine) normal          0.2-1.0                         

 

                                                    nitrate, urine (test code = 

nitrate, urine) negative        negative                        

 

                                                    urine leukocyte esterase 

(test code = urine leukocyte 

esterase)       negative        negative                        

 

                                                    RBC, urine (test code = RBC,

 

urine)          <1              0-5                             

 

                                                    WBC, urine (test code = WBC,

 

urine)          1-5             0-5                             

 

                                                    epithelial cell (test code =

 

epithelial cell) 6-10            0-5                             

 

                                                    bacteria, urine (test code =

 

bacteria, urine) trace           none detect                     

 

                                                    casts,urine (test code = 

casts,urine)    2-5             none detect                     

 

                                                    urine culture added? (test 

code = urine culture added?) no                                              





[facility] 



                      Test Item  Value      Reference Range Interpretation Comme

nts

 

                      RPR (test code = RPR) nonreactive nonreactive            





[facility] 13:25:00* 



                      Test Item  Value      Reference Range Interpretation Comme

nts

 

                                                    HIV P24 Ag (test code = HIV 

P24 

Ag)             non-reactive    nonreactive                     

 

                                                    HIV-1/2 Ab (test code = HIV-

1/2 

Ab)             non-reactive    nonreactive                     





[facility] drug [id number] 12:09:00* 



                      Test Item  Value      Reference Range Interpretation Comme

nts

 

                                                    amphetamines screen urine (t

est code 

= amphetamines screen urine) negative        negative                        

 

                                                    barbiturates, urine quant. (

test 

code = barbiturates, urine quant.) negative        negative                     

   

 

                                                    benzodiazepines screen urine

 (test 

code = benzodiazepines screen urine) negative        negative                   

     

 

                                                    cannabinoids (test code = 

cannabinoids)   negative        negative                        

 

                      cocaine (test code = cocaine) negative   negative         

     

 

                      opiates (test code = opiates) negative   negative         

     

 

                                                    hydrocodone (test code = 

hydrocodone)    negative        negative                        

 

                      fentanyl (test code = fentanyl) negative   negative       

       

 

                                                    phencyclidine (test code = 

phencyclidine)  negative        negative                        

 

                      methadone (test code = methadone) negative   negative     

         

 

                                                    propoxyphene (test code = 

propoxyphene)   negative        negative                        

 

                      oxycodone (test code = oxycodone) negative   negative     

         

 

                                                    drug screen note (test code 

= drug 

screen note)    .                                               





[facility] [id number] 11:58:00* 



                      Test Item  Value      Reference Range Interpretation Comme

nts

 

                                                    Hemoglobin A1c/Hemoglobin.to

gidla in 

Blood (test code = 4548-4) 5.8 %           4.0-6.0                         





[facility] W Auto Differential panel - [id number] 11:42:00
  * 



                      Test Item  Value      Reference Range Interpretation Comme

nts

 

                                                    white blood count (test code

 = 

white blood count) 10.4 K/uL       4.0-11.5                        

 

                                                    red blood count (test code =

 red 

blood count)    3.98 M/uL       3.80-5.20                       

 

                                                    hemoglobin (test code = 

hemoglobin)     11.3 g/dL       10.5-15.7                       

 

                                                    hematocrit (test code = 

hematocrit)     35.7 %          34.0-50.0                       

 

                                                    mean corpuscular volume (kyle

t code 

= mean corpuscular volume) 89.7 fL         86.0-100.0                      

 

                                                    mean corpuscular hemoglobin 

(test 

code = mean corpuscular 

hemoglobin)     28.4 pg         26.2-33.4                       

 

                                                    mean corpuscular HGB conc (t

est 

code = mean corpuscular HGB conc) 31.7 g/dL       30.0-34.0                     

  

 

                                                    red cell distribution width 

(test 

code = red cell distribution 

width)          14.7 %          12.0-15.5                       

 

                                                    platelet count (test code = 

platelet count) 252 K/uL        165-450                         

 

                                                    mean platelet volume (test c

ode = 

mean platelet volume) 10.4 fL         9.4-12.6                        

 

                                                    neutrophils % (test code = 

neutrophils %)  66.4 %          44.4-80.1                       

 

                      Ig% (test code = Ig%) 1.0 %      0.0-0.4    H          

 

                                                    lymphocyte% (test code = 

lymphocyte%)    25.0 %          10.0-50.0                       

 

                      mono % (test code = mono %) 6.0 %      3.6-12.0           

   

 

                      eos % (test code = eos %) 1.2 %      0.0-5.4              

 

 

                                                    basophil % (test code = baso

debbie 

%)              0.4 %           0.1-1.2                         

 

                                                    absolute neutrophil count (t

est 

code = absolute neutrophil count) 6.93 K/uL       1.56-6.13       H             

  

 

                      Ig# (test code = Ig#) 0.10 K/uL  0.00-0.03  H          

 

                      lymph # (test code = lymph #) 2.60 K/uL  1.18-3.74        

     

 

                      mono # (test code = mono #) 0.63 K/uL  0.24-0.86          

   

 

                      eos # (test code = eos #) 0.12 K/uL  0.04-0.36            

 

 

                                                    basophil # (test code = baso

debbie 

#)              0.04 K/uL       0.01-0.08                       

 

                      NRBC% (test code = NRBC%) 0 /100 WBC 0-0.2                

 

 

                      NRBC# (test code = NRBC#) 0 K/uL                          

 





[facility]antsIndirect antiglobulin test.unspecified reagent [Presence]
 in Serum or [id number] 09:50:00* 



                      Test Item  Value      Reference Range Interpretation Comme

nts

 

                      ind gerardo (test code = ind gerardo) negative              

           





[facility]antsUrinal[facility] macro (dipstick) panel - [id number] 
08:52:07* 



                      Test Item  Value      Reference Range Interpretation Comme

nts

 

                      Leukocytes (test code = Leukocytes) Negative              

           

 

                      Nitrite (test code = Nitrite) negative                    

     

 

                                                    Urobilinogen (test code = 

Urobilinogen)   .2                                              

 

                      Protein (test code = Protein) Negative                    

     

 

                      pH (test code = pH) 6.0                              

 

                      Blood (test code = Blood) Negative                        

 

 

                                                    Specific Gravity (test code 

= 

Specific Gravity) 1.030                                           

 

                      Ketone (test code = Ketone) Trace                         

   

 

                      Bilirubin (test code = Bilirubin) Negative                

         

 

                      Glucose (test code = Glucose) 1000                        

     

 

                      Appearance (test code = Appearance) Clear                 

           

 

                      Color (test code = Color) Yellow                          

 





[facility]antsGlucose [Mass/volume] in Capillary [id number] 
08:52:00* 



                      Test Item  Value      Reference Range Interpretation Comme

nts

 

                      GLU (test code = GLU) 134                              





[facility]antsGlucose [Mass/volume] in Capillary [id number] 
10:04:00* 



                      Test Item  Value      Reference Range Interpretation Comme

nts

 

                      GLU (test code = GLU) 139                              





[facility]antsUrinal[facility] macro (dipstick) panel - [id number] 
10:02:00* 



                      Test Item  Value      Reference Range Interpretation Comme

nts

 

                      Leukocytes (test code = Leukocytes) Negative              

           

 

                      Nitrite (test code = Nitrite) negative                    

     

 

                                                    Urobilinogen (test code = 

Urobilinogen)   .2                                              

 

                      Protein (test code = Protein) Negative                    

     

 

                      pH (test code = pH) 6.5                              

 

                      Blood (test code = Blood) Negative                        

 

 

                                                    Specific Gravity (test code 

= 

Specific Gravity) 1.030                                           

 

                      Ketone (test code = Ketone) Small                         

   

 

                      Bilirubin (test code = Bilirubin) Negative                

         

 

                      Glucose (test code = Glucose) 250                         

     

 

                      Appearance (test code = Appearance) Clear                 

           

 

                      Color (test code = Color) Yellow                          

 





[facility] InfantsMicroscopic observation [Identifier] in Specimen by Wet 
[id number] 23:50:00Wet [facility]urinalysis
2023 22:22:00* 



                      Test Item  Value      Reference Range Interpretation Comme

nts

 

                                                    color, urine (test code = 

color, urine)   yellow                                          

 

                                                    appearance, urine (test code

 

= appearance, urine) clear           clear                           

 

                                                    urine glucose (test code = 

urine glucose)  trace (50 mg/dL) negative                        

 

                                                    bilirubin, urine (test code 

= bilirubin, urine) negative        negative                        

 

                                                    ketone, urine (test code = 

ketone, urine)  2+(moderate)    negative        A               

 

                                                    specific gravity,urine (test

 

code = specific 

gravity,urine)  1.030           1.003-1.030                     

 

                                                    blood urine (test code = 

blood urine)    negative        negative                        

 

                                                    pH,urine (test code = 

pH,urine)       6.000           5-9                             

 

                                                    protein urine (UA) (test 

code = protein urine (UA)) 1+ (30 mg/dL)   negative        A               

 

                                                    urobilinogen, urine (test 

code = urobilinogen, urine) normal          0.2-1.0                         

 

                                                    nitrate, urine (test code = 

nitrate, urine) negative        negative                        

 

                                                    urine leukocyte esterase 

(test code = urine leukocyte 

esterase)       negative        negative                        

 

                                                    RBC, urine (test code = RBC,

 

urine)          1-5             0-5                             

 

                                                    WBC, urine (test code = WBC,

 

urine)          1-5             0-5                             

 

                                                    epithelial cell (test code =

 

epithelial cell) 1-5             0-5                             

 

                                                    bacteria, urine (test code =

 

bacteria, urine) trace           none detect                     

 

                                                    casts,urine (test code = 

casts,urine)    6-10            none detect     A               

 

                                                    urine culture added? (test 

code = urine culture added?) no                                              





[facility] GroupHemoglobin A1c [Mass/volume] in [id number] 12:36:00
  * 



                      Test Item  Value      Reference Range Interpretation Comme

nts

 

                                                    Hemoglobin A1c/Hemoglobin.to

gilda in 

Blood (test code = 4548-4) 6.1 %           4.0-6.0         H               





[facility] GroupGlucose [Mass/volume] in Serum or Plasma --1 hour post 
dose [id number] 09:22:00* 



                      Test Item  Value      Reference Range Interpretation Comme

nts

 

                      Results (test code = Results) 179                         

     





[facility] macro (dipstick) panel - [id number] 
08:50:00* 



                      Test Item  Value      Reference Range Interpretation Comme

nts

 

                      Leukocytes (test code = Leukocytes) Negative              

           

 

                      Nitrite (test code = Nitrite) negative                    

     

 

                                                    Urobilinogen (test code = 

Urobilinogen)   .2                                              

 

                      Protein (test code = Protein) 30                          

     

 

                      pH (test code = pH) 5.5                              

 

                      Blood (test code = Blood) Negative                        

 

 

                                                    Specific Gravity (test code 

= 

Specific Gravity) 1.030                                           

 

                      Ketone (test code = Ketone) Moderate                      

   

 

                      Bilirubin (test code = Bilirubin) Negative                

         

 

                      Glucose (test code = Glucose) Negative                    

     

 

                      Appearance (test code = Appearance) Clear                 

           

 

                      Color (test code = Color) Yellow                          

 





[facility]Ur[facility] macro (dipstick) panel - [id number] 
10:06:00* 



                      Test Item  Value      Reference Range Interpretation Comme

nts

 

                      Leukocytes (test code = Leukocytes) Negative              

           

 

                      Nitrite (test code = Nitrite) negative                    

     

 

                                                    Urobilinogen (test code = 

Urobilinogen)   .2                                              

 

                      Protein (test code = Protein) Negative                    

     

 

                      pH (test code = pH) 6.0                              

 

                      Blood (test code = Blood) Negative                        

 

 

                                                    Specific Gravity (test code 

= 

Specific Gravity) 1.030                                           

 

                      Ketone (test code = Ketone) Small                         

   

 

                      Bilirubin (test code = Bilirubin) Negative                

         

 

                      Glucose (test code = Glucose) 1000                        

     

 

                      Appearance (test code = Appearance) Clear                 

           

 

                      Color (test code = Color) Yellow                          

 





[facility]Urinal[facility] macro (dipstick) panel - [id number] 
09:00:26* 



                      Test Item  Value      Reference Range Interpretation Comme

nts

 

                      Leukocytes (test code = Leukocytes) Negative              

           

 

                      Nitrite (test code = Nitrite) negative                    

     

 

                                                    Urobilinogen (test code = 

Urobilinogen)   .2                                              

 

                      Protein (test code = Protein) Negative                    

     

 

                      pH (test code = pH) 5.5                              

 

                      Blood (test code = Blood) Negative                        

 

 

                                                    Specific Gravity (test code 

= 

Specific Gravity) 1.030                                           

 

                      Ketone (test code = Ketone) Negative                      

   

 

                      Bilirubin (test code = Bilirubin) Negative                

         

 

                      Glucose (test code = Glucose) 100                         

     

 

                      Appearance (test code = Appearance) Clear                 

           

 

                      Color (test code = Color) Yellow                          

 





[facility]antsChromosome 13+18+21+X+Y aneuploidy in Blood by Molecular 
genetics method [id number] 00:00:00* 



                      Test Item  Value      Reference Range Interpretation Comme

nts

 

                                                    report summary (test code 

= report summary) low risk                                        

 

                                                    report note (test code = 

report note)    see notes                                       

 

                                                    trisomy 13 age-based risk 

text (test code = trisomy 

13 age-based risk text) 1/858 (0.12%)                                   

 

                                                    trisomy 13 risk score text 

(test code = trisomy 13 

risk score text) <1/10,000 (<0.01%)                                 

 

                                                    trisomy 13 result text 

(test code = trisomy 13 

result text)    low risk                                        

 

                                                    trisomy 18 age-based risk 

text (test code = trisomy 

18 age-based risk text) 1/272 (0.37%)                                   

 

                                                    trisomy 18 risk score text 

(test code = trisomy 18 

risk score text) <1/10,000 (<0.01%)                                 

 

                                                    trisomy 18 result text 

(test code = trisomy 18 

result text)    low risk                                        

 

                                                    trisomy 21 age-based risk 

text (test code = trisomy 

21 age-based risk text) 1/117 (0.85%)                                   

 

                                                    trisomy 21 risk score text 

(test code = trisomy 21 

risk score text) <1/10,000 (<0.01%)                                 

 

                                                    trisomy 21 result text 

(test code = trisomy 21 

result text)    low risk                                        

 

                                                    monosomy X age-based risk 

text (test code = monosomy 

X age-based risk text) 1/255 (0.39%)                                   

 

                                                    monosomy X risk score text 

(test code = monosomy X 

risk score text) <1/10,000 (<0.01%)                                 

 

                                                    monosomy X result text 

(test code = monosomy X 

result text)    low risk                                        

 

                                                    triploidy result text 

(test code = triploidy 

result text)    low risk                                        

 

                                                    gender of fetus (test code 

= gender of fetus) male                                            

 

                                                    fetal fraction (test code 

= fetal fraction) 3.1%                                            

 

                                                    footnotes (test code = 

footnotes)      see notes                                       





[facility] GroupGenetic screen in Specimen by Molecular genetics method 
[id number] 00:00:00* 



                      Test Item  Value      Reference Range Interpretation Comme

nts

 

                                                    report summary (test code = 

report 

summary)        negative                                        

 

                                                    alpha-thalassemia (test code

 = 

alpha-thalassemia) negative                                        

 

                                                    beta-hemoglobinopathies (kyle

t code 

= beta-hemoglobinopathies) negative                                        

 

                                                    canavan disease (test code =

 

canavan disease) negative                                        

 

                                                    cystic fibrosis (test code =

 cystic 

fibrosis)       negative                                        

 

                                                    duchenne/womack muscular dys

trophy 

(test code = duchenne/womack 

muscular dystrophy) negative                                        

 

                                                    familial dysautonomia (test 

code = 

familial dysautonomia) negative                                        

 

                                                    fragile X syndrome (test cod

e = 

fragile X syndrome) negative                                        

 

                                                    galactosemia (test code = 

galactosemia)   negative                                        

 

                                                    gaucher disease (test code =

 

gaucher disease) negative                                        

 

                                                    medium chain acyl-coa dehydr

ogenase 

deficiency (test code = medium 

chain acyl-coa dehydrogenase 

deficiency)     negative                                        

 

                                                    polycystic kidney disease, 

autosomal recessive (test code = 

polycystic kidney disease, 

autosomal recessive) negative                                        

 

                                                    smith-lemli-opitz syndrome (

test 

code = smith-lemli-opitz syndrome) negative                                     

   

 

                                                    spinal muscular atrophy (kyle

t code 

= spinal muscular atrophy) negative                                        

 

                                                    rosalva-sachs disease (test code

 = 

rosalva-sachs disease) negative                                        

 

                                                    panel notes (test code = pan

el 

notes)          see notes                                       

 

                                                    report note (test code = rep

ort 

note)           see notes                                       

 

                      footnotes (test code = footnotes) see notes               

         





[facility] Group[facility] [Identifier] in Vaginal fluid by 
Wet [id number] 09:24:31* 



                      Test Item  Value      Reference Range Interpretation Comme

nts

 

                      Clue Cells (test code = Clue Cells) negative              

           

 

                      WBCs (test code = WBCs) negative                         

 

                                                    Trichomonads (test code = 

Trichomonads)   negative                                        

 

                                                    Epithelial cells (test code 

= 

Epithelial cells) normal                                          

 

                      RBCs (test code = RBCs) negative                         





[facility]stick[facility] - [id number] 
08:42:44* 



                      Test Item  Value      Reference Range Interpretation Comme

nts

 

                      Leukocytes (test code = Leukocytes) Negative              

           

 

                      Nitrite (test code = Nitrite) negative                    

     

 

                                                    Urobilinogen (test code = 

Urobilinogen)   .2                                              

 

                      Protein (test code = Protein) Negative                    

     

 

                      pH (test code = pH) 6.0                              

 

                      Blood (test code = Blood) Negative                        

 

 

                                                    Specific Gravity (test code 

= 

Specific Gravity) 1.030                                           

 

                      Ketone (test code = Ketone) Negative                      

   

 

                      Bilirubin (test code = Bilirubin) Negative                

         

 

                      Glucose (test code = Glucose) Negative                    

     

 

                      Appearance (test code = Appearance) Clear                 

           

 

                      Color (test code = Color) Yellow                          

 





[facility],urine pres id [id number] 08:45:00* 



                      Test Item  Value      Reference Range Interpretation Comme

nts

 

                                                    culture,urine (test 

code = culture,urine)                   scant skin yonatan 

present. pathogen not 

present at 2 days.                                          





[facility]antsReagin Ab [Presence] in Serum by [id number] 13:57:00* 



                      Test Item  Value      Reference Range Interpretation Comme

nts

 

                      RPR (test code = RPR) nonreactive nonreactive            





[facility]antsBacteria identified in Urine by [id number] 
08:42:00* 



                      Test Item  Value      Reference Range Interpretation Comme

nts

 

                                                    culture,urine (test 

code = culture,urine)                   scant skin yonatan 

present. pathogen not 

present at 1 day.                                           





[facility]sp[facility], LB + reflex to HR HPV if ASC--[id number] 00:00:00* 



                      Test Item  Value      Reference Range Interpretation Comme

nts

 

                                                    TP reflex HPV ASCUS (test co

de = TP 

reflex HPV ASCUS) abnormal                        A               

 

                      HPV (test code = HPV) normal                           





[facility]antschl/[id number] 19:34:00* 



                      Test Item  Value      Reference Range Interpretation Comme

nts

 

                      CT (test code = CT) CT not detected                       

 

                      NG (test code = NG) NG not detected                       





[facility]antsRub[facility] virus Ab [Titer] in [id number] 18:53:00* 



                      Test Item  Value      Reference Range Interpretation Comme

nts

 

                      rubella IgG (test code = rubella IgG) > 500               

             





[facility]antsHIV 1+2 Ab [Presence] in [id number] 18:20:00* 



                      Test Item  Value      Reference Range Interpretation Comme

nts

 

                                                    HIV P24 Ag (test code = HIV 

P24 

Ag)             non-reactive    nonreactive                     

 

                                                    HIV-1/2 Ab (test code = HIV-

1/2 

Ab)             non-reactive    nonreactive                     





[facility]antsHemoglobin A1c [Mass/volume] in [id number] 18:04:00
  * 



                      Test Item  Value      Reference Range Interpretation Comme

nts

 

                                                    Hemoglobin A1c/Hemoglobin.to

gilda in 

Blood (test code = 4548-4) 6.8 %           4.0-6.0         H               





[facility]antsMicroscopic observation [Identifier] in Vaginal fluid by 
Wet [id number] 17:59:10* 



                      Test Item  Value      Reference Range Interpretation Comme

nts

 

                      Clue Cells (test code = Clue Cells) negative              

           

 

                      WBCs (test code = WBCs) negative                         

 

                                                    Trichomonads (test code = 

Trichomonads)   negative                                        

 

                                                    Epithelial cells (test code 

= 

Epithelial cells) normal                                          

 

                      RBCs (test code = RBCs) negative                         





[facility] Auto Differential panel - [id number] 17:53:00
  * 



                      Test Item  Value      Reference Range Interpretation Comme

nts

 

                                                    white blood count (test code

 = 

white blood count) 8.4 K/uL        4.0-11.5                        

 

                                                    red blood count (test code =

 red 

blood count)    4.57 M/uL       3.80-5.20                       

 

                                                    hemoglobin (test code = 

hemoglobin)     13.2 g/dL       10.5-15.7                       

 

                                                    hematocrit (test code = 

hematocrit)     41.1 %          34.0-50.0                       

 

                                                    mean corpuscular volume (kyle

t code 

= mean corpuscular volume) 89.9 fL         86.0-100.0                      

 

                                                    mean corpuscular hemoglobin 

(test 

code = mean corpuscular 

hemoglobin)     28.9 pg         26.2-33.4                       

 

                                                    mean corpuscular HGB conc (t

est 

code = mean corpuscular HGB conc) 32.1 g/dL       30.0-34.0                     

  

 

                                                    red cell distribution width 

(test 

code = red cell distribution 

width)          15.5 %          12.0-15.5                       

 

                                                    platelet count (test code = 

platelet count) 277 K/uL        165-450                         

 

                                                    mean platelet volume (test c

ode = 

mean platelet volume) 10.0 fL         9.4-12.6                        

 

                                                    neutrophils % (test code = 

neutrophils %)  70.6 %          44.4-80.1                       

 

                      Ig% (test code = Ig%) 0.7 %      0.0-0.4    H          

 

                                                    lymphocyte% (test code = 

lymphocyte%)    21.8 %          10.0-50.0                       

 

                      mono % (test code = mono %) 5.8 %      3.6-12.0           

   

 

                      eos % (test code = eos %) 0.7 %      0.0-5.4              

 

 

                                                    basophil % (test code = baso

debbie 

%)              0.4 %           0.1-1.2                         

 

                                                    absolute neutrophil count (t

est 

code = absolute neutrophil count) 5.94 K/uL       1.56-6.13                     

  

 

                      Ig# (test code = Ig#) 0.06 K/uL  0.00-0.03  H          

 

                      lymph # (test code = lymph #) 1.83 K/uL  1.18-3.74        

     

 

                      mono # (test code = mono #) 0.49 K/uL  0.24-0.86          

   

 

                      eos # (test code = eos #) 0.06 K/uL  0.04-0.36            

 

 

                                                    basophil # (test code = baso

debbie 

#)              0.03 K/uL       0.01-0.08                       

 

                      NRBC% (test code = NRBC%) 0 /100 WBC 0-0.2                

 

 

                      NRBC# (test code = NRBC#) 0 K/uL                          

 





[facility] [Type[facility] in [id number] 16:03:00* 



                      Test Item  Value      Reference Range Interpretation Comme

nts

 

                      blood type (test code = blood type) an                    

           

 

                      ind gerardo (test code = ind gerardo) negative              

           





[facility] GroupUr[facility]dipstick[facility] - [id number] 
15:21:00* 



                      Test Item  Value      Reference Range Interpretation Comme

nts

 

                      Leukocytes (test code = Leukocytes) Negative              

           

 

                      Nitrite (test code = Nitrite) negative                    

     

 

                                                    Urobilinogen (test code = 

Urobilinogen)   .2                                              

 

                      Protein (test code = Protein) Negative                    

     

 

                      pH (test code = pH) 5.5                              

 

                      Blood (test code = Blood) Negative                        

 

 

                                                    Specific Gravity (test code 

= 

Specific Gravity) 1.020                                           

 

                      Ketone (test code = Ketone) Small                         

   

 

                      Bilirubin (test code = Bilirubin) Negative                

         

 

                      Glucose (test code = Glucose) 500                         

     

 

                      Appearance (test code = Appearance) Clear                 

           

 

                      Color (test code = Color) Yellow                          

 





[facility]Gl[facility] [Mass/volume] in Capillary [id number] 
14:35:00* 



                      Test Item  Value      Reference Range Interpretation Comme

nts

 

                      GLU (test code = GLU) 211                              





[facility]spregnancy [facility], [id number] 14:35:00* 



                      Test Item  Value      Reference Range Interpretation Comme

nts

 

                                                    Pregnancy Test (test code = 

Pregnancy Test) positive                                        





[facility]

## 2024-12-19 VITALS — OXYGEN SATURATION: 100 % | SYSTOLIC BLOOD PRESSURE: 134 MMHG | TEMPERATURE: 97.8 F | DIASTOLIC BLOOD PRESSURE: 84 MMHG

## 2024-12-19 NOTE — EDPHYS
Physician Documentation                                                                           

 [facility]                                                                 

Name: Pia Hawkins                                                                             

Age: 39 yrs                                                                                       

Sex: Female                                                                                       

: 1985                                                                                   

MRN: [id number]                                                                                   

Arrival Date: 2024                                                                          

Time: 23:11                                                                                       

Account#: [id number]                                                                            

Bed Treatment                                                                                     

Private MD:                                                                                       

ED Physician Apollo Rosa                                                                    

HPI:                                                                                              

                                                                                             

23:12 This 39 yrs old  Female presents to ER via [facility] with complaints of acute sp4 

      allergic reaction .                                                                         

                                                                                             

21:14 Patient presents with acute dyspnea and acute allergic reaction acute throat discomfort sp4 

      secondary to allergic reaction to cinnamon. Acute facial plethora itching and feeling       

      unwell.                                                                                     

                                                                                                  

OB/GYN:                                                                                           

01:13 LMP N/A - Birth control method, Not pregnant                                            kl  

                                                                                                  

Historical:                                                                                       

- Allergies:                                                                                      

01:14 Cinnamon;                                                                               kl  

01:14 Clindamycin;                                                                            kl  

01:14 Latex;                                                                                  kl  

- Home Meds:                                                                                      

01:14 Lexapro 20 mg Oral tab 1 tab once daily [Active]; lisinopril-hydrochlorothiazide        kl  

      20-12.5 mg Oral tab 1 tab once daily [Active]; metformin 1 Oral tab 1 tab [Active];         

      Wellbutrin Oral [Active];                                                                   

- PMHx:                                                                                           

01:14 clotting disorder; Hypertension; PCOS;                                                  kl  

                                                                                                  

- Immunization history:: Adult Immunizations up to date.                                          

- Infectious Disease History:: Denies.                                                            

- Family history:: not pertinent.                                                                 

- Social history:: Smoking status: unknown.                                                       

                                                                                                  

                                                                                                  

ROS:                                                                                              

21:14 Constitutional: Negative for fever, chills, and weight loss, positive for throat        sp4 

      discomfort positive shortness of breath positive itching , positive acute allergic          

      reaction                                                                                    

21:14 All other systems are negative,                                                             

                                                                                                  

Exam:                                                                                             

21:14 Constitutional:  This is a well developed, well nourished patient who is awake, alert,  sp4 

      moderate distress secondary to shortness of breath, acute throat discomfort, Head/Face:     

       Normocephalic, atraumatic.  Positive for facial plethora Eyes:  Pupils equal round and     

      reactive to light, extra-ocular motions intact.  Lids and lashes normal.  Conjunctiva       

      and sclera are not injected.  Cornea within normal limits.  Periorbital areas with no       

      swelling, redness, or edema. ENT:  Nares patent. No nasal discharge, no septal              

      abnormalities noted.  Tympanic membranes are normal and external auditory canals are        

      clear.  Oropharynx with no redness, swelling, or masses, exudates, or evidence of           

      obstruction, uvula midline.  Mucous membranes moist. Neck:  Trachea midline, no             

      thyromegaly or masses palpated, and no cervical lymphadenopathy.  Supple, full range of     

      motion without nuchal rigidity, or vertebral point tenderness.  Chest/axilla:  Normal       

      chest wall appearance and motion.  Nontender with no deformity.  No lesions are             

      appreciated. Cardiovascular:  Regular rate and rhythm with a normal S1 and S2.  No          

      gallops, murmurs, or rubs.  Normal PMI, no JVD.  No pulse deficits. Respiratory:  Lungs     

      have equal breath sounds bilaterally, clear to auscultation and percussion.  No rales,      

      rhonchi or wheezes noted.  No increased work of breathing, no retractions or nasal          

      flaring. Abdomen/GI:  Soft,  with normal bowel sounds.  No distension or tympany.  No       

      guarding or rebound.  No evidence of tenderness throughout. Back:  No spinal                

      tenderness.  No costovertebral tenderness.     Skin:  Warm, dry with normal turgor.         

      Normal color with no rashes, no lesions, and no evidence of cellulitis. MS/ Extremity:      

      Pulses equal, no cyanosis.  Neurovascular intact.  Full, normal range of motion. Neuro:     

       Awake and alert, GCS 15, oriented to person, place, time, and situation.  Cranial          

      nerves II-XII grossly intact.  Motor strength 5/5 in all extremities.  Sensory grossly      

      intact.  Psych:  Awake, alert, with orientation to person, place and time.  Behavior,       

      mood, and affect are within normal limits                                                   

                                                                                                  

Vital Signs:                                                                                      

                                                                                             

23:35  / 81; Pulse 99; Resp 22; Pulse Ox 98% ;                                          kmf 

                                                                                             

01:00 Pulse 88; Resp 17; Pulse Ox 96% ;                                                       kmf 

01:13  / 84; Pulse 99; Resp 17; Temp 97.8(TE); Pulse Ox 100% on R/A; Pain 0/10;         kl  

01:13 Pain Scale: Adult                                                                       kl  

                                                                                                  

[place] Coma Score:                                                                               

21:14 Eye Response: spontaneous(4). Motor Response: obeys commands(6). Verbal Response:       sp4 

      oriented(5). Total: 15.                                                                     

                                                                                                  

MDM:                                                                                              

                                                                                             

23:14 Medical Screening Exam initiated                                                        sp4 

                                                                                             

21:14 Differential diagnosis: acute pericarditis, costochondritis, esophagitis, gastritis,    sp4 

      Acute allergic reaction. Data reviewed: vital signs, nurses notes, old medical records.     

      ED course: Patient has markedly improved after medications. Patient has no signs of         

      airway compromise. Patient stable for discharge home with p.o. prednisone for 5 days        

      also advised to take Benadryl 50 mg 3 times a day as needed for itching and swelling.       

      Return to ER precautions discussed with patient in detail..                                 

                                                                                                  

                                                                                             

23:14 Order name: Saline Lock; Complete Time: 23:27                                           sp4 

                                                                                                  

Administered Medications:                                                                         

                                                                                             

23:26 Drug: MethylPrednisoLONE  mg IVP once Route: IVP; Site: left antecubital;        jb4 

                                                                                             

00:29 Follow up: Response: No adverse reaction; Marked relief of symptoms                     kl  

                                                                                             

23:27 Drug: Albuterol Inhalation 2.5 mg Inhalation once Route: Inhalation;                    jb4 

                                                                                             

00:29 Follow up: Response: No adverse reaction; Marked relief of symptoms                     kl  

                                                                                             

23:27 Drug: NS 0.9% IV 1000 ml IV at 1 bolus Per protocol; to be given as a bolus over 60     jb4 

      minutes Route: IV; Rate: 1 bolus; Site: left antecubital;                                   

23:27 Drug: Famotidine IVP 40 mg IVP once; dilute with 10 mL 0.9% NaCl; give over 2 minutes   jb4 

      {Note: put in 1L bolus per providers instructions..} Route: IVP; Site: left antecubital;    

                                                                                             

00:29 Follow up: Response: No adverse reaction; Marked relief of symptoms                     kl  

                                                                                             

23:27 Drug: diphenhydrAMINE IVP 50 mg IVP once Route: IVP; Site: left antecubital;            jb4 

                                                                                             

00:29 Follow up: Response: No adverse reaction; Marked relief of symptoms                     kl  

                                                                                                  

                                                                                                  

Disposition:                                                                                      

21:16 Chart complete.                                                                         sp4 

                                                                                                  

Disposition Summary:                                                                              

24 00:53                                                                                    

Discharge Ordered                                                                                 

 Notes:       Location: Home                                                                        
  sp4

      Problem: new                                                                            sp4 

      Symptoms: have improved                                                                 sp4 

      Condition: Stable                                                                       sp4 

      Diagnosis                                                                                   

        - Acute allergic reaction, acute dyspnea                                              sp4 

      Followup:                                                                               sp4 

        - With: Private Physician                                                                  

        - When: 2 - 3 days                                                                         

        - Reason: Recheck today's complaints                                                       

      Discharge Instructions:                                                                     

        - Discharge Summary Sheet                                                             sp4 

        - Allergies, Adult, Easy-to-Read                                                      sp4 

      Forms:                                                                                      

        - Patient Portal Instructions                                                         sp4 

      Prescriptions:                                                                              

        - Prednisone 20 mg Oral Tablet                                                             

            - take 2 tablets ORAL route once daily for 5 days; 10 tablet; Refills: 0, Product sp4 

      Selection Permitted                                                                         

Signatures:                                                                                       

Pat Nicole RN                     RN   kl                                                   

Cristian Samuel RN RN   jb4                                                  

Apollo Rosa MD MD   sp4                                                  

                                                                                                  

**************************************************************************************************

## 2024-12-19 NOTE — ER
Nurse's Notes                                                                                     

 [facility]                                                                 

Name: Pia Hawkins                                                                             

Age: 39 yrs                                                                                       

Sex: Female                                                                                       

: 1985                                                                                   

MRN: [id number]                                                                                   

Arrival Date: 2024                                                                          

Time: 23:11                                                                                       

Account#: [id number]                                                                            

Bed Treatment                                                                                     

Private MD:                                                                                       

Diagnosis: Acute allergic reaction, acute dyspnea                                                 

                                                                                                  

Presentation:                                                                                     

                                                                                             

23:30 Chief complaint: Patient states: I somehow came in contact with cinnamon. My throat     jb4 

      feels tight and I am having a hard time breathing.                                          

23:30 Coronavirus screen: At this time, the client does not indicate any symptoms associated  jb4 

      with coronavirus-19. Ebola Screen: No symptoms or risks identified at this time.            

      Initial Sepsis Screen: Does the patient meet any 2 criteria? No. Patient's initial          

      sepsis screen is negative. Does the patient have a suspected source of infection? No.       

      Patient's initial sepsis screen is negative. Risk Assessment: Do you want to hurt           

      yourself or someone else? Patient reports no desire to harm self or others. Onset of        

      symptoms was 2024. Transition of care: patient was not received from           

      another setting of care.                                                                    

23:30 Method Of Arrival: Ambulatory                                                           jb4 

23:30 Acuity: QUINTIN 2                                                                           jb4 

                                                                                                  

Triage Assessment:                                                                                

                                                                                             

01:17 General: Appears in no apparent distress. Behavior is calm, cooperative.                kl  

                                                                                                  

OB/GYN:                                                                                           

01:13 LMP N/A - Birth control method, Not pregnant                                            kl  

                                                                                                  

Historical:                                                                                       

- Allergies:                                                                                      

01:14 Cinnamon;                                                                               kl  

01:14 Clindamycin;                                                                            kl  

01:14 Latex;                                                                                  kl  

- Home Meds:                                                                                      

01:14 Lexapro 20 mg Oral tab 1 tab once daily [Active]; lisinopril-hydrochlorothiazide        kl  

      20-12.5 mg Oral tab 1 tab once daily [Active]; metformin 1 Oral tab 1 tab [Active];         

      Wellbutrin Oral [Active];                                                                   

- PMHx:                                                                                           

01:14 clotting disorder; Hypertension; PCOS;                                                  kl  

                                                                                                  

- Immunization history:: Adult Immunizations up to date.                                          

- Infectious Disease History:: Denies.                                                            

- Family history:: not pertinent.                                                                 

- Social history:: Smoking status: unknown.                                                       

                                                                                                  

                                                                                                  

Screenin:15 [facility] ED Fall Risk Assessment (Adult) History of falling in the last 3 months,       kl  

      including since admission No falls in past 3 months (0 pts) Confusion or Disorientation     

      No (0 pts) Intoxicated or Sedated No (0 pts) Impaired Gait No (0 pts) Mobility Assist       

      Device Used No (0 pt) Altered Elimination No (0 pt) Score/Fall Risk Level 0 - 2 = Low       

      Risk Oriented to surroundings, Maintained a safe environment. Abuse screen: Denies          

      threats or abuse. Nutritional screening: No deficits noted. Tuberculosis screening: No      

      symptoms or risk factors identified.                                                        

                                                                                                  

Assessment:                                                                                       

01:14 Reassessment: Patient appears in no apparent distress at this time. Patient denies pain kl  

      at this time. Patient states feeling better. Patient states symptoms have improved.         

      Pain: Denies pain. Respiratory: No deficits noted. Airway is patent Trachea midline         

      Respiratory effort is even, unlabored. Derm: No deficits noted.                             

                                                                                                  

Vital Signs:                                                                                      

                                                                                             

23:35  / 81; Pulse 99; Resp 22; Pulse Ox 98% ;                                          kmf 

                                                                                             

01:00 Pulse 88; Resp 17; Pulse Ox 96% ;                                                       kmf 

01:13  / 84; Pulse 99; Resp 17; Temp 97.8(TE); Pulse Ox 100% on R/A; Pain 0/10;         kl  

01:13 Pain Scale: Adult                                                                       kl  

                                                                                                  

Mu Coma Score:                                                                               

21:14 Eye Response: spontaneous(4). Motor Response: obeys commands(6). Verbal Response:       sp4 

      oriented(5). Total: 15.                                                                     

                                                                                                  

ED Course:                                                                                        

                                                                                             

23:12 Patient arrived in ED.                                                                  ty  

23:12 Apollo Rosa MD is Attending Physician.                                           sp4 

                                                                                             

01:08 Triage completed.                                                                       jb4 

01:15 Patient has correct armband on for positive identification. Allergy band placed.          

      Provided Education on:.                                                                     

01:16 No provider procedures requiring assistance completed. IV discontinued, intact,         kl  

      bleeding controlled, No redness/swelling at site. Pressure dressing applied.                

                                                                                                  

Administered Medications:                                                                         

                                                                                             

23:26 Drug: MethylPrednisoLONE  mg IVP once Route: IVP; Site: left antecubital;        jb4 

                                                                                             

00:29 Follow up: Response: No adverse reaction; Marked relief of symptoms                       

 Drug: Albuterol Inhalation 2.5 mg Inhalation once Route: Inhalation;                    jb4 

                                                                                             

00:29 Follow up: Response: No adverse reaction; Marked relief of symptoms                       

 Drug: NS 0.9% IV 1000 ml IV at 1 bolus Per protocol; to be given as a bolus over 60     jb4 

      minutes Route: IV; Rate: 1 bolus; Site: left antecubital;                                   

23:27 Drug: Famotidine IVP 40 mg IVP once; dilute with 10 mL 0.9% NaCl; give over 2 minutes   jb4 

      {Note: put in 1L bolus per providers instructions..} Route: IVP; Site: left antecubital;    

                                                                                             

00:29 Follow up: Response: No adverse reaction; Marked relief of symptoms                       

                                                                                             

23:27 Drug: diphenhydrAMINE IVP 50 mg IVP once Route: IVP; Site: left antecubital;            jb4 

                                                                                             

00:29 Follow up: Response: No adverse reaction; Marked relief of symptoms                       

                                                                                                  

                                                                                                  

Medication:                                                                                       

01:17 VIS not applicable for this client.                                                       

                                                                                                  

Outcome:                                                                                          

00:53 Discharge ordered by MD.                                                                zoraida 

01:16 Discharged to home ambulatory,                                                            

01:16 Condition: improved                                                                         

01:16 Discharge instructions given to patient, Instructed on discharge instructions, follow       

      up and referral plans. medication usage, Demonstrated understanding of instructions,        

      follow-up care, medications, Prescriptions given X 1,                                       

01:17 Patient left the ED.                                                                      

                                                                                                  

Signatures:                                                                                       

Pat Nicole RN                     RN   Cristian Park RN RN jb4 Potepalov, Sergey, MD MD sp4 Forrester, Kelsey Maroul kmf Yandell, Tylor ty                                                   

                                                                                                  

**************************************************************************************************